# Patient Record
Sex: MALE | Race: WHITE | NOT HISPANIC OR LATINO | Employment: UNEMPLOYED | URBAN - NONMETROPOLITAN AREA
[De-identification: names, ages, dates, MRNs, and addresses within clinical notes are randomized per-mention and may not be internally consistent; named-entity substitution may affect disease eponyms.]

---

## 2021-07-02 ENCOUNTER — OFFICE VISIT (OUTPATIENT)
Dept: FAMILY MEDICINE CLINIC | Facility: CLINIC | Age: 51
End: 2021-07-02

## 2021-07-02 VITALS
DIASTOLIC BLOOD PRESSURE: 95 MMHG | BODY MASS INDEX: 40.12 KG/M2 | TEMPERATURE: 98.5 F | WEIGHT: 296.2 LBS | RESPIRATION RATE: 20 BRPM | HEIGHT: 72 IN | SYSTOLIC BLOOD PRESSURE: 160 MMHG | OXYGEN SATURATION: 98 % | HEART RATE: 89 BPM

## 2021-07-02 DIAGNOSIS — Z12.11 SCREEN FOR COLON CANCER: ICD-10-CM

## 2021-07-02 DIAGNOSIS — Z13.220 SCREENING FOR HYPERLIPIDEMIA: ICD-10-CM

## 2021-07-02 DIAGNOSIS — I10 ESSENTIAL HYPERTENSION: ICD-10-CM

## 2021-07-02 DIAGNOSIS — R20.2 PARESTHESIA: ICD-10-CM

## 2021-07-02 DIAGNOSIS — Z00.00 ROUTINE PHYSICAL EXAMINATION: ICD-10-CM

## 2021-07-02 DIAGNOSIS — M54.42 CHRONIC BILATERAL LOW BACK PAIN WITH LEFT-SIDED SCIATICA: ICD-10-CM

## 2021-07-02 DIAGNOSIS — G89.29 CHRONIC BILATERAL LOW BACK PAIN WITH LEFT-SIDED SCIATICA: ICD-10-CM

## 2021-07-02 DIAGNOSIS — F33.0 MILD EPISODE OF RECURRENT MAJOR DEPRESSIVE DISORDER (HCC): Primary | ICD-10-CM

## 2021-07-02 DIAGNOSIS — G89.29 CHRONIC PAIN OF LEFT KNEE: ICD-10-CM

## 2021-07-02 DIAGNOSIS — G47.33 OSA (OBSTRUCTIVE SLEEP APNEA): ICD-10-CM

## 2021-07-02 DIAGNOSIS — M25.562 CHRONIC PAIN OF LEFT KNEE: ICD-10-CM

## 2021-07-02 DIAGNOSIS — R20.0 BILATERAL HAND NUMBNESS: ICD-10-CM

## 2021-07-02 PROCEDURE — 99204 OFFICE O/P NEW MOD 45 MIN: CPT | Performed by: NURSE PRACTITIONER

## 2021-07-02 RX ORDER — TRAMADOL HYDROCHLORIDE 50 MG/1
TABLET ORAL
Qty: 60 TABLET | Refills: 1 | Status: SHIPPED | OUTPATIENT
Start: 2021-07-02 | End: 2021-07-30

## 2021-07-02 RX ORDER — CARVEDILOL 3.12 MG/1
3.12 TABLET ORAL 2 TIMES DAILY WITH MEALS
Qty: 60 TABLET | Refills: 1 | Status: SHIPPED | OUTPATIENT
Start: 2021-07-02 | End: 2021-07-30 | Stop reason: SDUPTHER

## 2021-07-02 RX ORDER — LISINOPRIL 40 MG/1
40 TABLET ORAL DAILY
Qty: 90 TABLET | Refills: 1 | Status: SHIPPED | OUTPATIENT
Start: 2021-07-02 | End: 2022-01-03

## 2021-07-02 RX ORDER — AMLODIPINE BESYLATE 10 MG/1
10 TABLET ORAL DAILY
Qty: 90 TABLET | Refills: 1 | Status: SHIPPED | OUTPATIENT
Start: 2021-07-02 | End: 2022-01-03

## 2021-07-02 RX ORDER — CHLORTHALIDONE 25 MG/1
TABLET ORAL
COMMUNITY
Start: 2021-06-08 | End: 2021-07-02 | Stop reason: SDUPTHER

## 2021-07-02 RX ORDER — CHLORTHALIDONE 25 MG/1
25 TABLET ORAL DAILY
Qty: 90 TABLET | Refills: 1 | Status: SHIPPED | OUTPATIENT
Start: 2021-07-02 | End: 2022-01-03

## 2021-07-02 RX ORDER — MELOXICAM 7.5 MG/1
7.5 TABLET ORAL DAILY
Qty: 30 TABLET | Refills: 2 | Status: SHIPPED | OUTPATIENT
Start: 2021-07-02 | End: 2021-08-25

## 2021-07-02 RX ORDER — AMLODIPINE BESYLATE 10 MG/1
TABLET ORAL
COMMUNITY
Start: 2021-06-08 | End: 2021-07-02 | Stop reason: SDUPTHER

## 2021-07-02 RX ORDER — LISINOPRIL 40 MG/1
TABLET ORAL
COMMUNITY
Start: 2021-06-08 | End: 2021-07-02 | Stop reason: SDUPTHER

## 2021-07-02 NOTE — PROGRESS NOTES
"       New Patient History and Physical      Referring Physician: No ref. provider found    Subjective     Chief Complaint:    Chief Complaint   Patient presents with   • Advice Only   • Back Pain   • Knee Pain   • Med Refill   • Hand Pain     numbness in both hands       History of Present Illness:   Moved here from [place].   Has hx of HTN. Takes norvasc, lisinopril, and chlorthalidone. Notes his BP is normally 160's.   He takes zoloft 50mg daily. Has hx of depression. Notes it takes the \"edge off\".   He has hx of MADELAINE but he could not tolerate CPAP. He tried for a few weeks but it made him feel like he smothered.   He has hx of chronic back pain, budging disc, DDD. He has hx of cortisone injections. Hurt so bad he passed out and then helped for about 2 days. He takes naproxen for pain. Does not help. He takes the a few times a day.   He also has chronic knee pain on the left. Happened after he twisted it working outside. Never seen ortho.   He notes numbness in his hands, used to be worse with overhead motions. Now constant, worse in right pinky and extends into his palm. He is dropping things. Numbness is constant.     Review of Systems   Gen- No fevers, chills  CV- No chest pain, palpitations  Resp- No cough, dyspnea  GI- No N/V/D, abd pain  Neuro-No dizziness, headaches    Past Medical History:   Past Medical History:   Diagnosis Date   • Bulging lumbar disc    • Hypertension    • Low back pain    • Pinched nerve    • Sleep apnea        Past Surgical History:History reviewed. No pertinent surgical history.    Family History: family history is not on file.    Social History:  reports that he has been smoking cigarettes. He has been smoking about 0.50 packs per day. He has never used smokeless tobacco. He reports current alcohol use. He reports that he does not use drugs.    Medications:    Current Outpatient Medications:   •  amLODIPine (NORVASC) 10 MG tablet, Take 1 tablet by mouth Daily., Disp: 90 tablet, Rfl: " "1  •  chlorthalidone (HYGROTON) 25 MG tablet, Take 1 tablet by mouth Daily., Disp: 90 tablet, Rfl: 1  •  lisinopril (PRINIVIL,ZESTRIL) 40 MG tablet, Take 1 tablet by mouth Daily., Disp: 90 tablet, Rfl: 1  •  carvedilol (Coreg) 3.125 MG tablet, Take 1 tablet by mouth 2 (Two) Times a Day With Meals., Disp: 60 tablet, Rfl: 1  •  meloxicam (Mobic) 7.5 MG tablet, Take 1 tablet by mouth Daily., Disp: 30 tablet, Rfl: 2  •  sertraline (Zoloft) 50 MG tablet, Take 1 tablet by mouth Daily., Disp: 90 tablet, Rfl: 1  •  traMADol (ULTRAM) 50 MG tablet, 1 po bid prn pain, Disp: 60 tablet, Rfl: 1    Allergies:  No Known Allergies    Objective     Vital Signs:   Vitals:    07/02/21 1031   BP: 160/95   Pulse: 89   Resp: 20   Temp: 98.5 °F (36.9 °C)   SpO2: 98%   Weight: 134 kg (296 lb 3.2 oz)   Height: 182.9 cm (72\")       Physical Exam:    Physical Exam  Vitals and nursing note reviewed.   Constitutional:       Appearance: He is well-developed. He is obese.   HENT:      Head: Normocephalic and atraumatic.      Right Ear: Tympanic membrane, ear canal and external ear normal.      Left Ear: Tympanic membrane, ear canal and external ear normal.      Nose: Nose normal.      Mouth/Throat:      Pharynx: Uvula midline.   Eyes:      General: Lids are normal. No scleral icterus.     Conjunctiva/sclera: Conjunctivae normal.      Pupils: Pupils are equal, round, and reactive to light.   Neck:      Thyroid: No thyromegaly.      Vascular: No carotid bruit.      Trachea: No tracheal deviation.   Cardiovascular:      Rate and Rhythm: Normal rate and regular rhythm.      Heart sounds: Normal heart sounds. No murmur heard.   No friction rub. No gallop.    Pulmonary:      Effort: Pulmonary effort is normal.      Breath sounds: Normal breath sounds.   Abdominal:      General: Bowel sounds are normal. There is no distension.      Palpations: Abdomen is soft.      Tenderness: There is no abdominal tenderness.   Musculoskeletal:      Cervical back: Neck " supple.      Thoracic back: Tenderness present.      Lumbar back: Positive right straight leg raise test.      Left knee: Crepitus present.   Lymphadenopathy:      Head:      Right side of head: No submental, submandibular, tonsillar, preauricular or posterior auricular adenopathy.      Left side of head: No submental, submandibular, tonsillar, preauricular or posterior auricular adenopathy.      Cervical: No cervical adenopathy.   Skin:     General: Skin is warm and dry.      Capillary Refill: Capillary refill takes less than 2 seconds.      Findings: No rash.   Neurological:      General: No focal deficit present.      Mental Status: He is alert and oriented to person, place, and time.      Cranial Nerves: No cranial nerve deficit.      Sensory: No sensory deficit.   Psychiatric:         Mood and Affect: Mood normal.         Behavior: Behavior normal.         Assessment / Plan     Assessment/Plan:   Problem List Items Addressed This Visit     None      Visit Diagnoses     Mild episode of recurrent major depressive disorder (CMS/HCC)    -  Primary    Relevant Medications    sertraline (Zoloft) 50 MG tablet    MADELAINE (obstructive sleep apnea)        Relevant Orders    Ambulatory Referral to Sleep Medicine    Paresthesia        Relevant Orders    Vitamin B12 (Completed)    Folate (Completed)    Chronic pain of left knee        Relevant Medications    meloxicam (Mobic) 7.5 MG tablet    Other Relevant Orders    XR Knee 3 View Left    Ambulatory Referral to Orthopedic Surgery    Essential hypertension        Relevant Medications    amLODIPine (NORVASC) 10 MG tablet    chlorthalidone (HYGROTON) 25 MG tablet    lisinopril (PRINIVIL,ZESTRIL) 40 MG tablet    carvedilol (Coreg) 3.125 MG tablet    Other Relevant Orders    Comprehensive Metabolic Panel (Completed)    CBC Auto Differential    Lipid Panel (Completed)    Chronic bilateral low back pain with left-sided sciatica        Relevant Medications    traMADol (ULTRAM) 50 MG  tablet    Bilateral hand numbness        Relevant Orders    Ambulatory Referral to Orthopedic Surgery    Screening for hyperlipidemia        Relevant Orders    Hemoglobin A1c (Completed)    Screen for colon cancer        Relevant Orders    Cologuard - Stool, Per Rectum    Routine physical examination            --Orders as above  --Add Coreg to current blood pressure med regimen.  Discussed blood pressure goal of systolic less than 140. Meds, diet, and lifestyle recommendation discussed at length. Home BP monitoring encouraged and appropriate intervals discussed.   --We will refer him to Ortho for likely carpal tunnel and left knee issues.  Did discuss bracing at night.  Start meloxicam.  Stop naproxen.  --Due to chronic pain will start low-dose tramadol.  --Continue Zoloft.  Mood well controlled.  --Referral to sleep medicine to see if they can help get him a better CPAP that he will be able to tolerate.  Did discuss untreated sleep apnea can make high blood pressure difficult to control    I have reviewed pertinent health maintenance applicable to this patient.    Follow up:  Return in about 4 weeks (around 7/30/2021).    Electronically signed by AMY Escamilla   07/02/2021 11:00 EDT      Please note that portions of this note may have been completed with a voice recognition program. Efforts were made to edit the dictations, but occasionally words are mistranscribed.

## 2021-07-03 LAB
ALBUMIN SERPL-MCNC: 4.7 G/DL (ref 3.5–5.2)
ALBUMIN/GLOB SERPL: 1.2 G/DL
ALP SERPL-CCNC: 82 U/L (ref 39–117)
ALT SERPL-CCNC: 84 U/L (ref 1–41)
AST SERPL-CCNC: 53 U/L (ref 1–40)
BASOPHILS # BLD AUTO: 0.08 10*3/MM3 (ref 0–0.2)
BASOPHILS NFR BLD AUTO: 1.3 % (ref 0–1.5)
BILIRUB SERPL-MCNC: 0.4 MG/DL (ref 0–1.2)
BUN SERPL-MCNC: 21 MG/DL (ref 6–20)
BUN/CREAT SERPL: 21 (ref 7–25)
CALCIUM SERPL-MCNC: 10.1 MG/DL (ref 8.6–10.5)
CHLORIDE SERPL-SCNC: 98 MMOL/L (ref 98–107)
CHOLEST SERPL-MCNC: 177 MG/DL (ref 0–200)
CO2 SERPL-SCNC: 26.6 MMOL/L (ref 22–29)
CREAT SERPL-MCNC: 1 MG/DL (ref 0.76–1.27)
EOSINOPHIL # BLD AUTO: 0.28 10*3/MM3 (ref 0–0.4)
EOSINOPHIL NFR BLD AUTO: 4.7 % (ref 0.3–6.2)
ERYTHROCYTE [DISTWIDTH] IN BLOOD BY AUTOMATED COUNT: 14.9 % (ref 12.3–15.4)
FOLATE SERPL-MCNC: 3.14 NG/ML (ref 4.78–24.2)
GLOBULIN SER CALC-MCNC: 3.8 GM/DL
GLUCOSE SERPL-MCNC: 102 MG/DL (ref 65–99)
HBA1C MFR BLD: 5.9 % (ref 4.8–5.6)
HCT VFR BLD AUTO: 47.6 % (ref 37.5–51)
HDLC SERPL-MCNC: 37 MG/DL (ref 40–60)
HGB BLD-MCNC: 16.5 G/DL (ref 13–17.7)
IMM GRANULOCYTES # BLD AUTO: 0.02 10*3/MM3 (ref 0–0.05)
IMM GRANULOCYTES NFR BLD AUTO: 0.3 % (ref 0–0.5)
LDLC SERPL CALC-MCNC: 113 MG/DL (ref 0–100)
LYMPHOCYTES # BLD AUTO: 1.89 10*3/MM3 (ref 0.7–3.1)
LYMPHOCYTES NFR BLD AUTO: 31.7 % (ref 19.6–45.3)
MCH RBC QN AUTO: 33.1 PG (ref 26.6–33)
MCHC RBC AUTO-ENTMCNC: 34.7 G/DL (ref 31.5–35.7)
MCV RBC AUTO: 95.6 FL (ref 79–97)
MONOCYTES # BLD AUTO: 0.6 10*3/MM3 (ref 0.1–0.9)
MONOCYTES NFR BLD AUTO: 10.1 % (ref 5–12)
NEUTROPHILS # BLD AUTO: 3.1 10*3/MM3 (ref 1.7–7)
NEUTROPHILS NFR BLD AUTO: 51.9 % (ref 42.7–76)
NRBC BLD AUTO-RTO: 0.2 /100 WBC (ref 0–0.2)
PLATELET # BLD AUTO: 304 10*3/MM3 (ref 140–450)
POTASSIUM SERPL-SCNC: 4.6 MMOL/L (ref 3.5–5.2)
PROT SERPL-MCNC: 8.5 G/DL (ref 6–8.5)
RBC # BLD AUTO: 4.98 10*6/MM3 (ref 4.14–5.8)
SODIUM SERPL-SCNC: 137 MMOL/L (ref 136–145)
TRIGL SERPL-MCNC: 149 MG/DL (ref 0–150)
VIT B12 SERPL-MCNC: 415 PG/ML (ref 211–946)
VLDLC SERPL CALC-MCNC: 27 MG/DL (ref 5–40)
WBC # BLD AUTO: 5.97 10*3/MM3 (ref 3.4–10.8)

## 2021-07-06 NOTE — PROGRESS NOTES
Labs show mild elevation in liver enzymes.  Likely fatty liver.  Has he ever had a liver ultrasound or been told this before?  Hemoglobin A1c is 5.9.  This is consistent with prediabetes.  Work on decreasing sugary and starchy foods.  His folic acid was low.  Likely due to alcohol intake.  Can take folic acid supplement over-the-counter.

## 2021-07-12 DIAGNOSIS — R79.89 ELEVATED LFTS: Primary | ICD-10-CM

## 2021-07-26 ENCOUNTER — APPOINTMENT (OUTPATIENT)
Dept: ULTRASOUND IMAGING | Facility: HOSPITAL | Age: 51
End: 2021-07-26

## 2021-07-30 ENCOUNTER — OFFICE VISIT (OUTPATIENT)
Dept: FAMILY MEDICINE CLINIC | Facility: CLINIC | Age: 51
End: 2021-07-30

## 2021-07-30 VITALS
RESPIRATION RATE: 20 BRPM | TEMPERATURE: 98.7 F | HEART RATE: 85 BPM | HEIGHT: 72 IN | DIASTOLIC BLOOD PRESSURE: 84 MMHG | BODY MASS INDEX: 39.66 KG/M2 | SYSTOLIC BLOOD PRESSURE: 158 MMHG | WEIGHT: 292.8 LBS | OXYGEN SATURATION: 98 %

## 2021-07-30 DIAGNOSIS — M25.562 CHRONIC PAIN OF LEFT KNEE: Primary | ICD-10-CM

## 2021-07-30 DIAGNOSIS — G89.29 CHRONIC BILATERAL LOW BACK PAIN WITH LEFT-SIDED SCIATICA: ICD-10-CM

## 2021-07-30 DIAGNOSIS — I10 ESSENTIAL HYPERTENSION: ICD-10-CM

## 2021-07-30 DIAGNOSIS — M54.42 CHRONIC BILATERAL LOW BACK PAIN WITH LEFT-SIDED SCIATICA: ICD-10-CM

## 2021-07-30 DIAGNOSIS — G89.29 CHRONIC PAIN OF LEFT KNEE: Primary | ICD-10-CM

## 2021-07-30 PROCEDURE — 99213 OFFICE O/P EST LOW 20 MIN: CPT | Performed by: NURSE PRACTITIONER

## 2021-07-30 RX ORDER — CARVEDILOL 6.25 MG/1
6.25 TABLET ORAL 2 TIMES DAILY WITH MEALS
Qty: 60 TABLET | Refills: 2 | Status: SHIPPED | OUTPATIENT
Start: 2021-07-30 | End: 2021-10-19

## 2021-07-30 RX ORDER — GABAPENTIN 300 MG/1
CAPSULE ORAL
Qty: 60 CAPSULE | Refills: 1 | Status: SHIPPED | OUTPATIENT
Start: 2021-07-30 | End: 2021-08-25

## 2021-07-30 NOTE — PROGRESS NOTES
"      Subjective     Chief Complaint:    Chief Complaint   Patient presents with   • Follow-up     bp       History of Present Illness:   Was started on coreg last visit. Tolerating without side effects. Does not check BP at home.   Was also started on mobic and tramadol for pain. Neither has helped. He cannot even tell he takes it. He continues to have chronic back pain, chronic left knee pain, bilateral hand numbness. Does not have ortho appt yet. Has not had appt yet either.     Review of Systems  Gen- No fevers, chills  CV- No chest pain, palpitations  Resp- No cough, dyspnea  GI- No N/V/D, abd pain  Neuro-No dizziness, headaches      I have reviewed and/or updated the patient's past medical, surgical, family, social history and problem list as appropriate.     Medications:    Current Outpatient Medications:   •  amLODIPine (NORVASC) 10 MG tablet, Take 1 tablet by mouth Daily., Disp: 90 tablet, Rfl: 1  •  carvedilol (Coreg) 6.25 MG tablet, Take 1 tablet by mouth 2 (Two) Times a Day With Meals., Disp: 60 tablet, Rfl: 2  •  chlorthalidone (HYGROTON) 25 MG tablet, Take 1 tablet by mouth Daily., Disp: 90 tablet, Rfl: 1  •  lisinopril (PRINIVIL,ZESTRIL) 40 MG tablet, Take 1 tablet by mouth Daily., Disp: 90 tablet, Rfl: 1  •  meloxicam (Mobic) 7.5 MG tablet, Take 1 tablet by mouth Daily., Disp: 30 tablet, Rfl: 2  •  sertraline (Zoloft) 50 MG tablet, Take 1 tablet by mouth Daily., Disp: 90 tablet, Rfl: 1  •  gabapentin (NEURONTIN) 300 MG capsule, Take 1 capsule daily x 3 days, may increase 1 capsule every 3 days up to 3 times daily, Disp: 60 capsule, Rfl: 1    Allergies:  No Known Allergies    Objective     Vital Signs:   Vitals:    07/30/21 1306   BP: 158/84   Pulse: 85   Resp: 20   Temp: 98.7 °F (37.1 °C)   SpO2: 98%   Weight: 133 kg (292 lb 12.8 oz)   Height: 182.9 cm (72\")       Physical Exam:    Physical Exam  Vitals and nursing note reviewed.   Constitutional:       Appearance: He is well-developed.   HENT:      " Head: Normocephalic and atraumatic.   Eyes:      Pupils: Pupils are equal, round, and reactive to light.   Cardiovascular:      Rate and Rhythm: Normal rate and regular rhythm.      Heart sounds: Normal heart sounds.   Pulmonary:      Effort: Pulmonary effort is normal.      Breath sounds: Normal breath sounds.   Abdominal:      General: Bowel sounds are normal. There is no distension.      Palpations: Abdomen is soft.      Tenderness: There is no abdominal tenderness.   Musculoskeletal:      Cervical back: Neck supple.   Skin:     General: Skin is warm and dry.   Neurological:      Mental Status: He is alert and oriented to person, place, and time.   Psychiatric:         Behavior: Behavior normal.         Body mass index is 39.71 kg/m².    Assessment / Plan     Assessment/Plan:   Problem List Items Addressed This Visit        Cardiac and Vasculature    Essential hypertension    Relevant Medications    amLODIPine (NORVASC) 10 MG tablet    chlorthalidone (HYGROTON) 25 MG tablet    lisinopril (PRINIVIL,ZESTRIL) 40 MG tablet    carvedilol (Coreg) 6.25 MG tablet       Musculoskeletal and Injuries    Chronic pain of left knee - Primary    Relevant Medications    meloxicam (Mobic) 7.5 MG tablet    Chronic bilateral low back pain with left-sided sciatica    Relevant Medications    gabapentin (NEURONTIN) 300 MG capsule        -- increase coreg to 6.25mg.   -- stop tramadol. Trial gabapentin for pain. Cautioned against using with alcohol  -- I gave him the phone number to ortho for him to call and schedule appt. Needs to get knee xray    Follow up:  Return in about 4 weeks (around 8/27/2021).    Electronically signed by AMY Escamilla   07/30/2021 13:48 EDT      Please note that portions of this note may have been completed with a voice recognition program. Efforts were made to edit the dictations, but occasionally words are mistranscribed.

## 2021-08-20 ENCOUNTER — HOSPITAL ENCOUNTER (OUTPATIENT)
Dept: GENERAL RADIOLOGY | Facility: HOSPITAL | Age: 51
Discharge: HOME | End: 2021-08-20
Admitting: NURSE PRACTITIONER

## 2021-08-20 DIAGNOSIS — M25.562 CHRONIC PAIN OF LEFT KNEE: ICD-10-CM

## 2021-08-20 DIAGNOSIS — G89.29 CHRONIC PAIN OF LEFT KNEE: ICD-10-CM

## 2021-08-20 PROCEDURE — 73562 X-RAY EXAM OF KNEE 3: CPT

## 2021-08-25 ENCOUNTER — OFFICE VISIT (OUTPATIENT)
Dept: FAMILY MEDICINE CLINIC | Facility: CLINIC | Age: 51
End: 2021-08-25

## 2021-08-25 VITALS
DIASTOLIC BLOOD PRESSURE: 92 MMHG | SYSTOLIC BLOOD PRESSURE: 138 MMHG | BODY MASS INDEX: 40.44 KG/M2 | HEART RATE: 78 BPM | HEIGHT: 72 IN | OXYGEN SATURATION: 97 % | WEIGHT: 298.6 LBS

## 2021-08-25 DIAGNOSIS — G89.29 CHRONIC BILATERAL LOW BACK PAIN WITH LEFT-SIDED SCIATICA: ICD-10-CM

## 2021-08-25 DIAGNOSIS — I10 ESSENTIAL HYPERTENSION: Primary | ICD-10-CM

## 2021-08-25 DIAGNOSIS — G89.29 CHRONIC PAIN OF LEFT KNEE: ICD-10-CM

## 2021-08-25 DIAGNOSIS — M25.562 CHRONIC PAIN OF LEFT KNEE: ICD-10-CM

## 2021-08-25 DIAGNOSIS — M54.42 CHRONIC BILATERAL LOW BACK PAIN WITH LEFT-SIDED SCIATICA: ICD-10-CM

## 2021-08-25 PROCEDURE — 99214 OFFICE O/P EST MOD 30 MIN: CPT | Performed by: NURSE PRACTITIONER

## 2021-08-25 RX ORDER — MELOXICAM 15 MG/1
15 TABLET ORAL DAILY
Qty: 30 TABLET | Refills: 3 | Status: SHIPPED | OUTPATIENT
Start: 2021-08-25 | End: 2022-02-01

## 2021-08-25 NOTE — PROGRESS NOTES
"      Subjective     Chief Complaint:    Chief Complaint   Patient presents with   • Follow-up     Patient complains of left knee pain and numbness in both hands.       History of Present Illness:   Here to f/u on pain and BP.   Tolerating increased dose of coreg  He has failed injections in his back  Gabapentin did not help his pain.   Has not made ortho appt yet  He has continued left knee pain.     Review of Systems  Gen- No fevers, chills  CV- No chest pain, palpitations  Resp- No cough, dyspnea  GI- No N/V/D, abd pain  Neuro-No dizziness, headaches      I have reviewed and/or updated the patient's past medical, surgical, family, social history and problem list as appropriate.     Medications:    Current Outpatient Medications:   •  amLODIPine (NORVASC) 10 MG tablet, Take 1 tablet by mouth Daily., Disp: 90 tablet, Rfl: 1  •  carvedilol (Coreg) 6.25 MG tablet, Take 1 tablet by mouth 2 (Two) Times a Day With Meals., Disp: 60 tablet, Rfl: 2  •  chlorthalidone (HYGROTON) 25 MG tablet, Take 1 tablet by mouth Daily., Disp: 90 tablet, Rfl: 1  •  lisinopril (PRINIVIL,ZESTRIL) 40 MG tablet, Take 1 tablet by mouth Daily., Disp: 90 tablet, Rfl: 1  •  meloxicam (Mobic) 15 MG tablet, Take 1 tablet by mouth Daily., Disp: 30 tablet, Rfl: 3  •  sertraline (Zoloft) 50 MG tablet, Take 1 tablet by mouth Daily., Disp: 90 tablet, Rfl: 1    Allergies:  No Known Allergies    Objective     Vital Signs:   Vitals:    08/25/21 1340   BP: 138/92   Pulse: 78   SpO2: 97%   Weight: 135 kg (298 lb 9.6 oz)   Height: 182.9 cm (72\")       Physical Exam:    Physical Exam  Vitals and nursing note reviewed.   Constitutional:       Appearance: He is well-developed.   HENT:      Head: Normocephalic and atraumatic.   Eyes:      Pupils: Pupils are equal, round, and reactive to light.   Cardiovascular:      Rate and Rhythm: Normal rate and regular rhythm.      Heart sounds: Normal heart sounds.   Pulmonary:      Effort: Pulmonary effort is normal.      " Breath sounds: Normal breath sounds.   Abdominal:      General: Bowel sounds are normal. There is no distension.      Palpations: Abdomen is soft.      Tenderness: There is no abdominal tenderness.   Musculoskeletal:      Cervical back: Neck supple.   Skin:     General: Skin is warm and dry.   Neurological:      Mental Status: He is alert and oriented to person, place, and time.   Psychiatric:         Behavior: Behavior normal.         Body mass index is 40.5 kg/m².    Assessment / Plan     Assessment/Plan:   Problem List Items Addressed This Visit        Cardiac and Vasculature    Essential hypertension - Primary    Relevant Medications    amLODIPine (NORVASC) 10 MG tablet    chlorthalidone (HYGROTON) 25 MG tablet    lisinopril (PRINIVIL,ZESTRIL) 40 MG tablet    carvedilol (Coreg) 6.25 MG tablet       Musculoskeletal and Injuries    Chronic pain of left knee    Relevant Medications    meloxicam (Mobic) 15 MG tablet    Chronic bilateral low back pain with left-sided sciatica        -- BP much better controlled on current regimen  -- will reach out to referral team for ortho referral, increase mobic to 15mg, stop osmany as it is not helping. We discussed pain management of which he wishes to hold off on at this time    Follow up:  Return in about 3 months (around 11/25/2021).    Electronically signed by AMY Escamilla   08/25/2021 14:05 EDT      Please note that portions of this note may have been completed with a voice recognition program. Efforts were made to edit the dictations, but occasionally words are mistranscribed.

## 2021-08-31 ENCOUNTER — OFFICE VISIT (OUTPATIENT)
Dept: NEUROLOGY | Facility: CLINIC | Age: 51
End: 2021-08-31

## 2021-08-31 ENCOUNTER — TELEPHONE (OUTPATIENT)
Dept: FAMILY MEDICINE CLINIC | Facility: CLINIC | Age: 51
End: 2021-08-31

## 2021-08-31 VITALS
DIASTOLIC BLOOD PRESSURE: 100 MMHG | HEIGHT: 72 IN | HEART RATE: 73 BPM | OXYGEN SATURATION: 99 % | SYSTOLIC BLOOD PRESSURE: 140 MMHG | BODY MASS INDEX: 41.42 KG/M2 | TEMPERATURE: 97.7 F | WEIGHT: 305.8 LBS

## 2021-08-31 DIAGNOSIS — G47.33 OBSTRUCTIVE SLEEP APNEA: Primary | ICD-10-CM

## 2021-08-31 DIAGNOSIS — G47.52 REM BEHAVIORAL DISORDER: ICD-10-CM

## 2021-08-31 DIAGNOSIS — F39 MOOD DISORDER (HCC): ICD-10-CM

## 2021-08-31 DIAGNOSIS — I10 HYPERTENSION, UNSPECIFIED TYPE: ICD-10-CM

## 2021-08-31 DIAGNOSIS — F10.90 CHRONIC ALCOHOL USE: ICD-10-CM

## 2021-08-31 DIAGNOSIS — G47.19 EXCESSIVE DAYTIME SLEEPINESS: ICD-10-CM

## 2021-08-31 PROCEDURE — 99213 OFFICE O/P EST LOW 20 MIN: CPT | Performed by: NURSE PRACTITIONER

## 2021-08-31 NOTE — PROGRESS NOTES
"     New Sleep Patient Office Visit      Patient Name: Dharmesh Samuel  : 1970   MRN: [id number]     Referring Physician: Michelle Sky APRN    Chief Complaint:    Chief Complaint   Patient presents with   • Consult     NP, in office to establish care for madelaine.        History of Present Illness: Dharmesh Samuel is a 50 y.o. male who is here today to establish care with Sleep Medicine.  He was diagnosed with severe MADELAINE, in [place], in 2019.  He was on Bipap therapy for a while but says he had to turn his machine back in because of compliance issues.  He is wanting to get back on PAP therapy.  He is having to sleep in a recliner because he can't lay down and sleep.  Sleep questionnaire reviewed.  He is able to fall asleep quickly, he wakes up 5-6 times during sleep and has no difficulty falling to sleep, he has pain that interferes with sleep, he has \"really stupid dreams\", he sleeps 3-4 hours per night, he has leg kicking/leg movements during sleep, he experiences restless or disturbed sleep, he takes naps on some days, he denies any excessive sleepiness when driving, he has been told he stops breathing during sleep.  He was previously diagnosed with REM behavior disorder.  He drinks alcohol each night before bed timea nd states \"without it, I wouldn't sleep at all\".  He asks if he can be prescribed a medication for weight loss and states \"I have tried everything and I can't lose weight\".  Additional risk factors- BMI 41, HTN, mood disorder, chronic alcohol use, chronic back pain, REM behavior disorder.   *Previous records from [facility], reviewed at the time of his visit.     Ronco Score: 21    Subjective      Review of Systems:   Review of Systems   Constitutional: Positive for fatigue. Negative for fever, unexpected weight gain and unexpected weight loss.   HENT: Negative for hearing loss, sore throat, swollen glands, tinnitus and trouble swallowing.    Eyes: Negative for blurred vision, double " vision, photophobia and visual disturbance.   Respiratory: Negative for cough, chest tightness and shortness of breath.    Cardiovascular: Negative for chest pain, palpitations and leg swelling.   Gastrointestinal: Negative for constipation, diarrhea and nausea.   Endocrine: Negative for cold intolerance and heat intolerance.   Musculoskeletal: Positive for back pain. Negative for gait problem, neck pain and neck stiffness.   Skin: Negative for color change and rash.   Allergic/Immunologic: Negative for environmental allergies and food allergies.   Neurological: Negative for dizziness, syncope, facial asymmetry, speech difficulty, weakness, headache, memory problem and confusion.   Psychiatric/Behavioral: Positive for sleep disturbance. Negative for agitation, behavioral problems and depressed mood. The patient is not nervous/anxious.        Past Medical History:   Past Medical History:   Diagnosis Date   • Arthritis    • Bulging lumbar disc    • Hypertension    • Low back pain    • Pinched nerve    • Sleep apnea        Past Surgical History: History reviewed. No pertinent surgical history.    Family History:   Family History   Problem Relation Age of Onset   • Sleep apnea Paternal Uncle        Social History:   Social History     Socioeconomic History   • Marital status:      Spouse name: Not on file   • Number of children: Not on file   • Years of education: Not on file   • Highest education level: Not on file   Tobacco Use   • Smoking status: Current Every Day Smoker     Packs/day: 0.50     Types: Cigarettes   • Smokeless tobacco: Current User   Substance and Sexual Activity   • Alcohol use: Yes     Comment: 4 drinks a day   • Drug use: Never   • Sexual activity: Defer       Medications:     Current Outpatient Medications:   •  amLODIPine (NORVASC) 10 MG tablet, Take 1 tablet by mouth Daily., Disp: 90 tablet, Rfl: 1  •  carvedilol (Coreg) 6.25 MG tablet, Take 1 tablet by mouth 2 (Two) Times a Day With  "Meals., Disp: 60 tablet, Rfl: 2  •  chlorthalidone (HYGROTON) 25 MG tablet, Take 1 tablet by mouth Daily., Disp: 90 tablet, Rfl: 1  •  lisinopril (PRINIVIL,ZESTRIL) 40 MG tablet, Take 1 tablet by mouth Daily., Disp: 90 tablet, Rfl: 1  •  meloxicam (Mobic) 15 MG tablet, Take 1 tablet by mouth Daily., Disp: 30 tablet, Rfl: 3  •  sertraline (Zoloft) 50 MG tablet, Take 1 tablet by mouth Daily., Disp: 90 tablet, Rfl: 1    Allergies:   No Known Allergies    Objective     Physical Exam:  Vital Signs:   Vitals:    08/31/21 1037   BP: 140/100   BP Location: Left arm   Patient Position: Sitting   Cuff Size: Adult   Pulse: 73   Temp: 97.7 °F (36.5 °C)   SpO2: 99%   Weight: (!) 139 kg (305 lb 12.8 oz)   Height: 182.9 cm (72\")   PainSc:   8   PainLoc: Back  Comment: knee     BMI: Body mass index is 41.47 kg/m².  Neck Circumference: 20 INCHES     Physical Exam  Vitals and nursing note reviewed.   Constitutional:       General: He is not in acute distress.     Appearance: He is well-developed. He is obese. He is not diaphoretic.   HENT:      Head: Normocephalic and atraumatic.      Comments: Mallampati 4  Eyes:      Conjunctiva/sclera: Conjunctivae normal.      Pupils: Pupils are equal, round, and reactive to light.   Neck:      Trachea: Trachea normal.   Cardiovascular:      Rate and Rhythm: Normal rate and regular rhythm.      Heart sounds: Normal heart sounds. No murmur heard.   No friction rub. No gallop.    Pulmonary:      Effort: Pulmonary effort is normal. No respiratory distress.      Breath sounds: Normal breath sounds. No wheezing or rales.   Musculoskeletal:         General: Normal range of motion.   Skin:     General: Skin is warm and dry.      Findings: No rash.   Neurological:      Mental Status: He is alert and oriented to person, place, and time.   Psychiatric:         Mood and Affect: Mood normal.         Behavior: Behavior normal.         Thought Content: Thought content normal.         Judgment: Judgment normal. "         Assessment / Plan      Assessment/Plan:   Diagnoses and all orders for this visit:    1. Obstructive sleep apnea (Primary)    2. Hypertension, unspecified type    3. Mood disorder (CMS/[facility])    4. Excessive daytime sleepiness    5. Chronic alcohol use    6. REM behavioral disorder    7. BMI 40.0-44.9, adult (CMS/HCC)    *Order for Bipap 20/16cm sent to [facility]. Advised patient insurance will require him to wear his Bipap for at least 5 hours 70% of the time.   *Advised patient to avoid driving if drowsy.   *Printed patient education on MADELAINE and REM behavior disorder provided today. Safety precautions regarding REM behavior disorder discussed with patient.   *Encouraged weight loss with a BMI goal of 24.    *Advised patient to discuss medications, that may help with weight loss, with his PCP.     Follow Up:   Return in about 3 months (around 11/30/2021) for F/U Obstructive Sleep Apnea.    I have advised the patient the need to continue the use of CPAP.  Gold standard for treatment of sleep apnea includes weight loss, use of cpap and avoidance of alcohol.  Untreated MADELAINE may increase the risk for development of hypertension, stroke, myocardial infarction, diabetes, cardiovascular disease, work-related issues and driving accidents. I have counseled and advised the patient to avoid driving or operating heavy/dangerous equipment if feeling drowsy.     AMY Goel, FNP-C  [facility] Neurology and Sleep Medicine       Please note that portions of this note may have been completed with a voice recognition program. Efforts were made to edit the dictations, but occasionally words are mistranscribed.

## 2021-08-31 NOTE — PATIENT INSTRUCTIONS
Sleep Apnea  Sleep apnea affects breathing during sleep. It causes breathing to stop for a short time or to become shallow. It can also increase the risk of:  · Heart attack.  · Stroke.  · Being very overweight (obese).  · Diabetes.  · Heart failure.  · Irregular heartbeat.  The goal of treatment is to help you breathe normally again.  What are the causes?  There are three kinds of sleep apnea:  · Obstructive sleep apnea. This is caused by a blocked or collapsed airway.  · Central sleep apnea. This happens when the brain does not send the right signals to the muscles that control breathing.  · Mixed sleep apnea. This is a combination of obstructive and central sleep apnea.  The most common cause of this condition is a collapsed or blocked airway. This can happen if:  · Your throat muscles are too relaxed.  · Your tongue and tonsils are too large.  · You are overweight.  · Your airway is too small.  What increases the risk?  · Being overweight.  · Smoking.  · Having a small airway.  · Being older.  · Being male.  · Drinking alcohol.  · Taking medicines to calm yourself (sedatives or tranquilizers).  · Having family members with the condition.  What are the signs or symptoms?  · Trouble staying asleep.  · Being sleepy or tired during the day.  · Getting angry a lot.  · Loud snoring.  · Headaches in the morning.  · Not being able to focus your mind (concentrate).  · Forgetting things.  · Less interest in sex.  · Mood swings.  · Personality changes.  · Feelings of sadness (depression).  · Waking up a lot during the night to pee (urinate).  · Dry mouth.  · Sore throat.  How is this diagnosed?  · Your medical history.  · A physical exam.  · A test that is done when you are sleeping (sleep study). The test is most often done in a sleep lab but may also be done at home.  How is this treated?    · Sleeping on your side.  · Using a medicine to get rid of mucus in your nose (decongestant).  · Avoiding the use of alcohol,  medicines to help you relax, or certain pain medicines (narcotics).  · Losing weight, if needed.  · Changing your diet.  · Not smoking.  · Using a machine to open your airway while you sleep, such as:  ? An oral appliance. This is a mouthpiece that shifts your lower jaw forward.  ? A CPAP device. This device blows air through a mask when you breathe out (exhale).  ? An EPAP device. This has valves that you put in each nostril.  ? A BPAP device. This device blows air through a mask when you breathe in (inhale) and breathe out.  · Having surgery if other treatments do not work.  It is important to get treatment for sleep apnea. Without treatment, it can lead to:  · High blood pressure.  · Coronary artery disease.  · In men, not being able to have an erection (impotence).  · Reduced thinking ability.  Follow these instructions at home:  Lifestyle  · Make changes that your doctor recommends.  · Eat a healthy diet.  · Lose weight if needed.  · Avoid alcohol, medicines to help you relax, and some pain medicines.  · Do not use any products that contain nicotine or tobacco, such as cigarettes, e-cigarettes, and chewing tobacco. If you need help quitting, ask your doctor.  General instructions  · Take over-the-counter and prescription medicines only as told by your doctor.  · If you were given a machine to use while you sleep, use it only as told by your doctor.  · If you are having surgery, make sure to tell your doctor you have sleep apnea. You may need to bring your device with you.  · Keep all follow-up visits as told by your doctor. This is important.  Contact a doctor if:  · The machine that you were given to use during sleep bothers you or does not seem to be working.  · You do not get better.  · You get worse.  Get help right away if:  · Your chest hurts.  · You have trouble breathing in enough air.  · You have an uncomfortable feeling in your back, arms, or stomach.  · You have trouble talking.  · One side of your  body feels weak.  · A part of your face is hanging down.  These symptoms may be an emergency. Do not wait to see if the symptoms will go away. Get medical help right away. Call your local emergency services (911 in the [place] Do not drive yourself to the hospital.  Summary  · This condition affects breathing during sleep.  · The most common cause is a collapsed or blocked airway.  · The goal of treatment is to help you breathe normally while you sleep.  This information is not intended to replace advice given to you by your health care provider. Make sure you discuss any questions you have with your health care provider.  Document Revised: 10/04/2019 Document Reviewed: 08/13/2019  [employer] Patient Education © 2021 [employer]

## 2021-09-03 ENCOUNTER — OFFICE VISIT (OUTPATIENT)
Dept: FAMILY MEDICINE CLINIC | Facility: CLINIC | Age: 51
End: 2021-09-03

## 2021-09-03 VITALS
BODY MASS INDEX: 41.47 KG/M2 | OXYGEN SATURATION: 97 % | SYSTOLIC BLOOD PRESSURE: 152 MMHG | DIASTOLIC BLOOD PRESSURE: 94 MMHG | TEMPERATURE: 97.5 F | WEIGHT: 306.2 LBS | RESPIRATION RATE: 18 BRPM | HEIGHT: 72 IN | HEART RATE: 88 BPM

## 2021-09-03 DIAGNOSIS — E66.01 CLASS 3 SEVERE OBESITY DUE TO EXCESS CALORIES WITH SERIOUS COMORBIDITY AND BODY MASS INDEX (BMI) OF 40.0 TO 44.9 IN ADULT (HCC): Primary | ICD-10-CM

## 2021-09-03 PROBLEM — E66.813 CLASS 3 SEVERE OBESITY DUE TO EXCESS CALORIES WITH SERIOUS COMORBIDITY AND BODY MASS INDEX (BMI) OF 40.0 TO 44.9 IN ADULT: Status: ACTIVE | Noted: 2021-09-03

## 2021-09-03 PROCEDURE — 99213 OFFICE O/P EST LOW 20 MIN: CPT | Performed by: NURSE PRACTITIONER

## 2021-09-03 RX ORDER — BUPROPION HYDROCHLORIDE 100 MG/1
100 TABLET, EXTENDED RELEASE ORAL
Qty: 30 TABLET | Refills: 1 | Status: SHIPPED | OUTPATIENT
Start: 2021-09-03 | End: 2022-02-16

## 2021-09-03 NOTE — PROGRESS NOTES
"      Subjective     Chief Complaint:    Chief Complaint   Patient presents with   • Weight Loss       History of Present Illness:   Has been having trouble loosing weight.   He has taking adipex in the past and it worked well. He does have HTN that is sub optimal controlled.   He is not interested in injectable medicine  He does drink 3-4 vodka drinks in the evening with almost over half a gallon of orange juice  He reports that he has tried several diets in the past including but not limited to low carb, low calorie, eating only vegetables.      Review of Systems  Gen- No fevers, chills  CV- No chest pain, palpitations  Resp- No cough, dyspnea  GI- No N/V/D, abd pain  Neuro-No dizziness, headaches      I have reviewed and/or updated the patient's past medical, surgical, family, social history and problem list as appropriate.     Medications:    Current Outpatient Medications:   •  amLODIPine (NORVASC) 10 MG tablet, Take 1 tablet by mouth Daily., Disp: 90 tablet, Rfl: 1  •  buPROPion SR (Wellbutrin SR) 100 MG 12 hr tablet, Take 1 tablet by mouth Every Morning., Disp: 30 tablet, Rfl: 1  •  carvedilol (Coreg) 6.25 MG tablet, Take 1 tablet by mouth 2 (Two) Times a Day With Meals., Disp: 60 tablet, Rfl: 2  •  chlorthalidone (HYGROTON) 25 MG tablet, Take 1 tablet by mouth Daily., Disp: 90 tablet, Rfl: 1  •  lisinopril (PRINIVIL,ZESTRIL) 40 MG tablet, Take 1 tablet by mouth Daily., Disp: 90 tablet, Rfl: 1  •  meloxicam (Mobic) 15 MG tablet, Take 1 tablet by mouth Daily., Disp: 30 tablet, Rfl: 3  •  sertraline (Zoloft) 50 MG tablet, Take 1 tablet by mouth Daily., Disp: 90 tablet, Rfl: 1    Allergies:  No Known Allergies    Objective     Vital Signs:   Vitals:    09/03/21 0854 09/03/21 0934   BP: (!) 160/104 152/94   Pulse: 88    Resp: 18    Temp: 97.5 °F (36.4 °C)    SpO2: 97%    Weight: (!) 139 kg (306 lb 3.2 oz)    Height: 182.9 cm (72\")        Physical Exam:    Physical Exam  Vitals and nursing note reviewed. "   Constitutional:       Appearance: He is well-developed. He is obese.   HENT:      Head: Normocephalic and atraumatic.   Eyes:      Pupils: Pupils are equal, round, and reactive to light.   Cardiovascular:      Rate and Rhythm: Normal rate and regular rhythm.      Heart sounds: Normal heart sounds.   Pulmonary:      Effort: Pulmonary effort is normal.      Breath sounds: Normal breath sounds.   Abdominal:      General: Bowel sounds are normal. There is no distension.      Palpations: Abdomen is soft.      Tenderness: There is no abdominal tenderness.   Musculoskeletal:      Cervical back: Neck supple.   Skin:     General: Skin is warm and dry.      Capillary Refill: Capillary refill takes less than 2 seconds.   Neurological:      General: No focal deficit present.      Mental Status: He is alert and oriented to person, place, and time.   Psychiatric:         Mood and Affect: Mood normal.         Behavior: Behavior normal.         Body mass index is 41.53 kg/m².    Assessment / Plan     Assessment/Plan:   Problem List Items Addressed This Visit        Endocrine and Metabolic    Class 3 severe obesity due to excess calories with serious comorbidity and body mass index (BMI) of 40.0 to 44.9 in adult (CMS/[facility]) - Primary    Relevant Medications    buPROPion SR (Wellbutrin SR) 100 MG 12 hr tablet        --Have encouraged patient to stop drinking as much orange juice.  Went over dietary recommendations of clean eating, eating fruits, veggies.  Avoiding sugar and starch.  Increase physical activity as tolerated however this is limited due to his knee pain.  Have collectively decided to do low-dose Wellbutrin to help with overeating aspect.  Did discuss Wellbutrin and alcohol can lower seizure threshold.  We will do sustained-release in the morning.    Follow up:  Return in about 6 weeks (around 10/15/2021).  Follow-up weight    Electronically signed by AMY Escamilla   09/03/2021 09:16 EDT      Please note that  portions of this note may have been completed with a voice recognition program. Efforts were made to edit the dictations, but occasionally words are mistranscribed.

## 2021-09-16 ENCOUNTER — OFFICE VISIT (OUTPATIENT)
Dept: ORTHOPEDIC SURGERY | Facility: CLINIC | Age: 51
End: 2021-09-16

## 2021-09-16 VITALS — HEIGHT: 72 IN | TEMPERATURE: 98.6 F | BODY MASS INDEX: 41.61 KG/M2 | WEIGHT: 307.2 LBS

## 2021-09-16 DIAGNOSIS — G89.29 CHRONIC PAIN OF LEFT KNEE: Primary | ICD-10-CM

## 2021-09-16 DIAGNOSIS — M25.562 CHRONIC PAIN OF LEFT KNEE: Primary | ICD-10-CM

## 2021-09-16 DIAGNOSIS — M17.10 ARTHRITIS OF KNEE: ICD-10-CM

## 2021-09-16 PROCEDURE — 99204 OFFICE O/P NEW MOD 45 MIN: CPT | Performed by: ORTHOPAEDIC SURGERY

## 2021-09-16 NOTE — PROGRESS NOTES
Subjective   Patient ID: Dharmesh Samuel is a 50 y.o. male  Pain of the Left Knee (Referred by AMY Escamilla for left knee pain. He states he has had pain for years, getting worse. He says knee feels unstable, has given out. Knee pops, difficulty bending due to inflamed feeling. No surgery, no injections. )             History of Present Illness  50-year-old with chronic left knee pain worsened over the last several years he attributes to a lot of sport activities including kickboxing he did for many years.  He has popping grinding inflamed full tight swollen feeling at times no recent injections no surgery.  Has tried medications with no improvement pain is mostly medial burning hurts with squatting activities.  No paresthesias swelling in the ankle or neurovascular complaints.  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      Review of Systems   Constitutional: Negative for fever.   HENT: Negative for dental problem and voice change.    Eyes: Negative for visual disturbance.   Respiratory: Negative for shortness of breath.    Cardiovascular: Negative for chest pain.   Gastrointestinal: Negative for abdominal pain.   Genitourinary: Negative for dysuria.   Musculoskeletal: Positive for arthralgias and joint swelling. Negative for gait problem.   Skin: Negative for rash.   Neurological: Negative for speech difficulty.   Hematological: Does not bruise/bleed easily.   Psychiatric/Behavioral: Negative for confusion.       Past Medical History:   Diagnosis Date   • Arthritis    • Bulging lumbar disc    • Hypertension    • Low back pain    • Pinched nerve    • Sleep apnea         No past surgical history on file.    Family History   Problem Relation Age of Onset   • Sleep apnea Paternal Uncle        Social History     Socioeconomic History   • Marital status:      Spouse name: Not on file   • Number of children: Not on file   • Years of education: Not on file   • Highest education level: Not on file  "  Tobacco Use   • Smoking status: Current Every Day Smoker     Packs/day: 0.50     Types: Cigarettes   • Smokeless tobacco: Current User   Substance and Sexual Activity   • Alcohol use: Yes     Comment: 4 drinks a day   • Drug use: Never   • Sexual activity: Defer       I have reviewed the medical and surgical history, family history, social history, medications, and/or allergies, and the review of systems of this report.    No Known Allergies      Current Outpatient Medications:   •  amLODIPine (NORVASC) 10 MG tablet, Take 1 tablet by mouth Daily., Disp: 90 tablet, Rfl: 1  •  buPROPion SR (Wellbutrin SR) 100 MG 12 hr tablet, Take 1 tablet by mouth Every Morning., Disp: 30 tablet, Rfl: 1  •  carvedilol (Coreg) 6.25 MG tablet, Take 1 tablet by mouth 2 (Two) Times a Day With Meals., Disp: 60 tablet, Rfl: 2  •  chlorthalidone (HYGROTON) 25 MG tablet, Take 1 tablet by mouth Daily., Disp: 90 tablet, Rfl: 1  •  lisinopril (PRINIVIL,ZESTRIL) 40 MG tablet, Take 1 tablet by mouth Daily., Disp: 90 tablet, Rfl: 1  •  meloxicam (Mobic) 15 MG tablet, Take 1 tablet by mouth Daily., Disp: 30 tablet, Rfl: 3  •  sertraline (Zoloft) 50 MG tablet, Take 1 tablet by mouth Daily., Disp: 90 tablet, Rfl: 1    Objective   Temp 98.6 °F (37 °C)   Ht 182.9 cm (72\")   Wt (!) 139 kg (307 lb 3.2 oz)   BMI 41.66 kg/m²    Physical Exam  Constitutional: Patient is oriented to person, place, and time. Patient appears well-developed and well-nourished.   HENT:Head: Normocephalic and atraumatic.   Eyes: EOM are normal. Pupils are equal, round, and reactive to light.   Neck: Normal range of motion. Neck supple.   Cardiovascular: Normal rate.    Pulmonary/Chest: Effort normal and breath sounds normal.   Abdominal: Soft.   Neurological: Patient is alert and oriented to person, place, and time.   Skin: Skin is warm and dry.   Psychiatric: Patient has a normal mood and affect.   Nursing note and vitals reviewed.       [unfilled]   Left knee: 2-3+ " effusion minimal warmth skin appears normal extension loss of 5 degrees positive Jennifer's finding with reproduction of anteromedial joint line pain ligament exam stable extensor mechanism intact no weakness atrophy  or neurovascular compromise, alignment neutral.    Assessment/Plan   Review of Radiographic Studies:    Indication to evaluate joint condition, no comparison views available, shows evident chronic advanced osteoarthritis.      Procedures     Diagnoses and all orders for this visit:    1. Chronic pain of left knee (Primary)    2. Arthritis of knee  -     MRI Knee Left Without Contrast       Orthopedic activities reviewed and patient expressed appreciation and Using illustrations and models, the nature of the pathology was explained to the patient      Recommendations/Plan:   Work/Activity Status: May perform usual activities as tolerated    Patient agreeable to call or return sooner for any concerns.         Discussed and reviewed the nature of his radiographic findings of arthritis the probability of degenerative meniscal tearing options of injections versus arthroscopic debridement were reviewed explained his questions answered.  He would like to consider surgical treatment pending his MRI.    Impression:  Left knee osteoarthritis 2-3+ effusion positive mechanical findings consistent with probable degenerative meniscal tear     Plan:  MRI left knee return to see me after MR complete

## 2021-09-21 DIAGNOSIS — M25.562 CHRONIC PAIN OF LEFT KNEE: ICD-10-CM

## 2021-09-21 DIAGNOSIS — G89.29 CHRONIC PAIN OF LEFT KNEE: ICD-10-CM

## 2021-09-21 RX ORDER — MELOXICAM 7.5 MG/1
TABLET ORAL
Qty: 30 TABLET | Refills: 0 | Status: SHIPPED | OUTPATIENT
Start: 2021-09-21 | End: 2021-11-17

## 2021-10-18 DIAGNOSIS — I10 ESSENTIAL HYPERTENSION: ICD-10-CM

## 2021-10-19 RX ORDER — CARVEDILOL 6.25 MG/1
TABLET ORAL
Qty: 60 TABLET | Refills: 0 | Status: SHIPPED | OUTPATIENT
Start: 2021-10-19 | End: 2021-11-08

## 2021-11-08 DIAGNOSIS — I10 ESSENTIAL HYPERTENSION: ICD-10-CM

## 2021-11-08 RX ORDER — CARVEDILOL 6.25 MG/1
TABLET ORAL
Qty: 60 TABLET | Refills: 0 | Status: SHIPPED | OUTPATIENT
Start: 2021-11-08 | End: 2021-12-13

## 2021-11-17 DIAGNOSIS — M25.562 CHRONIC PAIN OF LEFT KNEE: ICD-10-CM

## 2021-11-17 DIAGNOSIS — G89.29 CHRONIC PAIN OF LEFT KNEE: ICD-10-CM

## 2021-11-17 RX ORDER — MELOXICAM 7.5 MG/1
TABLET ORAL
Qty: 30 TABLET | Refills: 0 | Status: SHIPPED | OUTPATIENT
Start: 2021-11-17 | End: 2021-12-17

## 2021-12-10 DIAGNOSIS — I10 ESSENTIAL HYPERTENSION: ICD-10-CM

## 2021-12-13 RX ORDER — CARVEDILOL 6.25 MG/1
TABLET ORAL
Qty: 60 TABLET | Refills: 0 | Status: SHIPPED | OUTPATIENT
Start: 2021-12-13 | End: 2022-02-01

## 2021-12-17 DIAGNOSIS — M25.562 CHRONIC PAIN OF LEFT KNEE: ICD-10-CM

## 2021-12-17 DIAGNOSIS — G89.29 CHRONIC PAIN OF LEFT KNEE: ICD-10-CM

## 2021-12-17 RX ORDER — MELOXICAM 7.5 MG/1
TABLET ORAL
Qty: 30 TABLET | Refills: 0 | Status: SHIPPED | OUTPATIENT
Start: 2021-12-17 | End: 2022-01-19

## 2022-01-03 DIAGNOSIS — I10 ESSENTIAL HYPERTENSION: ICD-10-CM

## 2022-01-03 RX ORDER — LISINOPRIL 40 MG/1
TABLET ORAL
Qty: 30 TABLET | Refills: 0 | Status: SHIPPED | OUTPATIENT
Start: 2022-01-03 | End: 2022-02-16 | Stop reason: SDUPTHER

## 2022-01-03 RX ORDER — AMLODIPINE BESYLATE 10 MG/1
TABLET ORAL
Qty: 90 TABLET | Refills: 0 | Status: SHIPPED | OUTPATIENT
Start: 2022-01-03 | End: 2022-02-16 | Stop reason: SDUPTHER

## 2022-01-03 RX ORDER — CHLORTHALIDONE 25 MG/1
TABLET ORAL
Qty: 30 TABLET | Refills: 0 | Status: SHIPPED | OUTPATIENT
Start: 2022-01-03 | End: 2022-02-16 | Stop reason: SDUPTHER

## 2022-01-06 ENCOUNTER — TELEPHONE (OUTPATIENT)
Dept: INTERNAL MEDICINE | Facility: CLINIC | Age: 52
End: 2022-01-06

## 2022-01-06 DIAGNOSIS — M54.42 CHRONIC BILATERAL LOW BACK PAIN WITH LEFT-SIDED SCIATICA: ICD-10-CM

## 2022-01-06 DIAGNOSIS — G89.29 CHRONIC BILATERAL LOW BACK PAIN WITH LEFT-SIDED SCIATICA: ICD-10-CM

## 2022-01-07 RX ORDER — GABAPENTIN 300 MG/1
CAPSULE ORAL
Qty: 60 CAPSULE | Refills: 0 | OUTPATIENT
Start: 2022-01-07

## 2022-01-07 NOTE — TELEPHONE ENCOUNTER
"Patient called after hours.  On call personnel thought he was requesting immediate treatment for lower back pain. He has chronic back and knee pain.  Lower back pain is worse, and he would like to discuss getting an MRI of his knee. He states he was trying to make an appointment with his PCP.  Has taken gabapentin, ultram in the past.  Felt gabapentin worked well.  He was shoveling snow today, and \"it's killing my body\".  Home phone number updated in the chart.  Advised I would let his PCP know he is trying to make an appointment.    Phone call was disconnected.  Patient had previously been talking about living in the [facility] and not getting good cell signal, had tried to log into [employer] and was unsuccessful.  I had already told him I would send a message to Ms Sky regarding making an appointment for evaluation.    "

## 2022-01-11 ENCOUNTER — TELEPHONE (OUTPATIENT)
Dept: FAMILY MEDICINE CLINIC | Facility: CLINIC | Age: 52
End: 2022-01-11

## 2022-01-11 NOTE — TELEPHONE ENCOUNTER
Caller: Dharmesh Samuel    Relationship: Self    Best call back number: [phone]     What was the call regarding: PATIENT STATES THAT HE WOULD LIKE A CALL ABOUT HIS GABAPENTIN MEDICATION. PATIENT ALSO STATES THAT HE CAN NOT GET INTO HIS MYCHART.    Do you require a callback: YES

## 2022-01-12 DIAGNOSIS — M54.42 CHRONIC BILATERAL LOW BACK PAIN WITH LEFT-SIDED SCIATICA: ICD-10-CM

## 2022-01-12 DIAGNOSIS — G89.29 CHRONIC BILATERAL LOW BACK PAIN WITH LEFT-SIDED SCIATICA: ICD-10-CM

## 2022-01-12 RX ORDER — GABAPENTIN 300 MG/1
CAPSULE ORAL
Qty: 60 CAPSULE | Refills: 0 | OUTPATIENT
Start: 2022-01-12

## 2022-01-12 NOTE — TELEPHONE ENCOUNTER
"I do not have him on gabapentin. Last office visit says \"gabapentin did not help his pain\". Can someone try to help him with mychart next time they talk with him. He is very hard to get ahold of due to cell reception issues  "

## 2022-01-19 DIAGNOSIS — M25.562 CHRONIC PAIN OF LEFT KNEE: ICD-10-CM

## 2022-01-19 DIAGNOSIS — G89.29 CHRONIC PAIN OF LEFT KNEE: ICD-10-CM

## 2022-01-19 RX ORDER — MELOXICAM 7.5 MG/1
TABLET ORAL
Qty: 30 TABLET | Refills: 0 | Status: SHIPPED | OUTPATIENT
Start: 2022-01-19 | End: 2022-02-01

## 2022-02-01 ENCOUNTER — OFFICE VISIT (OUTPATIENT)
Dept: FAMILY MEDICINE CLINIC | Facility: CLINIC | Age: 52
End: 2022-02-01

## 2022-02-01 VITALS
HEART RATE: 67 BPM | TEMPERATURE: 98 F | WEIGHT: 303.6 LBS | HEIGHT: 72 IN | BODY MASS INDEX: 41.12 KG/M2 | SYSTOLIC BLOOD PRESSURE: 180 MMHG | DIASTOLIC BLOOD PRESSURE: 110 MMHG | OXYGEN SATURATION: 96 %

## 2022-02-01 DIAGNOSIS — M54.16 LUMBAR RADICULOPATHY: ICD-10-CM

## 2022-02-01 DIAGNOSIS — M25.562 CHRONIC PAIN OF LEFT KNEE: ICD-10-CM

## 2022-02-01 DIAGNOSIS — I10 ESSENTIAL HYPERTENSION: Primary | ICD-10-CM

## 2022-02-01 DIAGNOSIS — M54.42 CHRONIC BILATERAL LOW BACK PAIN WITH LEFT-SIDED SCIATICA: ICD-10-CM

## 2022-02-01 DIAGNOSIS — G89.29 CHRONIC BILATERAL LOW BACK PAIN WITH LEFT-SIDED SCIATICA: ICD-10-CM

## 2022-02-01 DIAGNOSIS — G47.33 OSA (OBSTRUCTIVE SLEEP APNEA): ICD-10-CM

## 2022-02-01 DIAGNOSIS — M17.10 ARTHRITIS OF KNEE: ICD-10-CM

## 2022-02-01 DIAGNOSIS — E66.01 CLASS 3 SEVERE OBESITY DUE TO EXCESS CALORIES WITH SERIOUS COMORBIDITY AND BODY MASS INDEX (BMI) OF 40.0 TO 44.9 IN ADULT: ICD-10-CM

## 2022-02-01 DIAGNOSIS — G89.29 CHRONIC PAIN OF LEFT KNEE: ICD-10-CM

## 2022-02-01 PROCEDURE — 99214 OFFICE O/P EST MOD 30 MIN: CPT | Performed by: NURSE PRACTITIONER

## 2022-02-01 RX ORDER — GABAPENTIN 400 MG/1
400 CAPSULE ORAL 2 TIMES DAILY
Qty: 60 CAPSULE | Refills: 1 | Status: SHIPPED | OUTPATIENT
Start: 2022-02-01 | End: 2022-02-16

## 2022-02-01 RX ORDER — MELOXICAM 15 MG/1
15 TABLET ORAL DAILY
Qty: 30 TABLET | Refills: 3 | Status: SHIPPED | OUTPATIENT
Start: 2022-02-01 | End: 2022-02-22

## 2022-02-01 RX ORDER — CARVEDILOL 12.5 MG/1
12.5 TABLET ORAL 2 TIMES DAILY WITH MEALS
Qty: 60 TABLET | Refills: 3 | Status: SHIPPED | OUTPATIENT
Start: 2022-02-01 | End: 2022-04-05

## 2022-02-01 NOTE — PROGRESS NOTES
Subjective     Chief Complaint:    Chief Complaint   Patient presents with   • Back Pain   • Sleeping Problem       History of Present Illness:   Still with back pain, low back, shoots down his legs, feels like pins and needles. Has long hx of back pain, failed PT and injections. Last MRI 5 years ago. Saw surgeon at that time but no surgery performed. Tramadol did not help. He would like to try gabapentin again    Was diagnosed with sleep apnea but has not had machine. Sleep study on file from 2019    HTN, on coreg, norvasc, ace, chlorthalidone. At times feels like his face was hot but denies any other symptoms    Has mild depression that is controlled on zoloft    Has OA, worse in knees, would like to go up on mobic        Review of Systems  Gen- No fevers, chills  CV- No chest pain, palpitations  Resp- No cough, dyspnea  GI- No N/V/D, abd pain  Neuro-No dizziness, headaches      I have reviewed and/or updated the patient's past medical, surgical, family, social history and problem list as appropriate.     Medications:    Current Outpatient Medications:   •  amLODIPine (NORVASC) 10 MG tablet, Take 1 tablet by mouth once daily, Disp: 90 tablet, Rfl: 0  •  buPROPion SR (Wellbutrin SR) 100 MG 12 hr tablet, Take 1 tablet by mouth Every Morning., Disp: 30 tablet, Rfl: 1  •  carvedilol (COREG) 12.5 MG tablet, Take 1 tablet by mouth 2 (Two) Times a Day With Meals., Disp: 60 tablet, Rfl: 3  •  chlorthalidone (HYGROTON) 25 MG tablet, Take 1 tablet by mouth once daily, Disp: 30 tablet, Rfl: 0  •  lisinopril (PRINIVIL,ZESTRIL) 40 MG tablet, Take 1 tablet by mouth once daily, Disp: 30 tablet, Rfl: 0  •  sertraline (Zoloft) 50 MG tablet, Take 1 tablet by mouth Daily., Disp: 90 tablet, Rfl: 1  •  gabapentin (NEURONTIN) 400 MG capsule, Take 1 capsule by mouth 2 (Two) Times a Day., Disp: 60 capsule, Rfl: 1  •  meloxicam (Mobic) 15 MG tablet, Take 1 tablet by mouth Daily., Disp: 30 tablet, Rfl: 3    Allergies:  No Known  "Allergies    Objective     Vital Signs:   Vitals:    02/01/22 1316   BP: (!) 180/110   Pulse: 67   Temp: 98 °F (36.7 °C)   SpO2: 96%   Weight: (!) 138 kg (303 lb 9.6 oz)   Height: 182.9 cm (72\")     Body mass index is 41.18 kg/m².    Physical Exam:    Physical Exam  Vitals and nursing note reviewed.   Constitutional:       Appearance: He is well-developed. He is obese.   HENT:      Head: Normocephalic and atraumatic.   Eyes:      Pupils: Pupils are equal, round, and reactive to light.   Neck:      Vascular: Carotid bruit present.   Cardiovascular:      Rate and Rhythm: Normal rate and regular rhythm.      Heart sounds: Normal heart sounds.   Pulmonary:      Effort: Pulmonary effort is normal.      Breath sounds: Normal breath sounds.   Abdominal:      General: Bowel sounds are normal. There is no distension.      Palpations: Abdomen is soft.      Tenderness: There is no abdominal tenderness.   Musculoskeletal:      Cervical back: Neck supple.   Skin:     General: Skin is warm and dry.      Capillary Refill: Capillary refill takes less than 2 seconds.   Neurological:      General: No focal deficit present.      Mental Status: He is alert and oriented to person, place, and time.   Psychiatric:         Mood and Affect: Mood normal.         Behavior: Behavior normal.         Assessment / Plan     Assessment/Plan:   Problem List Items Addressed This Visit        Cardiac and Vasculature    Essential hypertension - Primary    Relevant Medications    chlorthalidone (HYGROTON) 25 MG tablet    lisinopril (PRINIVIL,ZESTRIL) 40 MG tablet    amLODIPine (NORVASC) 10 MG tablet    carvedilol (COREG) 12.5 MG tablet       Endocrine and Metabolic    Class 3 severe obesity due to excess calories with serious comorbidity and body mass index (BMI) of 40.0 to 44.9 in adult (HCC)    Relevant Medications    buPROPion SR (Wellbutrin SR) 100 MG 12 hr tablet       Musculoskeletal and Injuries    Chronic pain of left knee    Relevant " Medications    meloxicam (Mobic) 15 MG tablet    Chronic bilateral low back pain with left-sided sciatica    Relevant Medications    gabapentin (NEURONTIN) 400 MG capsule    Other Relevant Orders    MRI Lumbar Spine Without Contrast    Arthritis of knee      Other Visit Diagnoses     MADELAINE (obstructive sleep apnea)        Relevant Orders    CPAP Therapy    Lumbar radiculopathy        Relevant Orders    MRI Lumbar Spine Without Contrast        -- BP uncontrolled, increase coreg, continue other meds  -- diet discussed, encouraged to cut back on etoh use  -- will check MRI of back, restart gabapentin, continue mobic  -- labs next visit  -- reorder CPAP    Follow up:  2-3 weeks    Electronically signed by AMY Escamilla   02/01/2022 13:40 EST      Please note that portions of this note may have been completed with a voice recognition program. Efforts were made to edit the dictations, but occasionally words are mistranscribed.  Answers for HPI/ROS submitted by the patient on 2/1/2022  What is the primary reason for your visit?: Back Pain  Chronicity: chronic  Onset: more than 1 month ago  Frequency: constantly  Progression since onset: unchanged  Pain location: gluteal, lumbar spine, sacro-iliac, thoracic spine  Pain quality: aching, burning, cramping, shooting, stabbing  Radiates to: left knee, left thigh, right thigh  Pain - numeric: 10/10  Pain is: the same all the time  Aggravated by: bending, position, lying down, sitting, standing, twisting  Stiffness is present: all day  leg pain: Yes  paresthesias: Yes  tingling: Yes  Risk factors: lack of exercise, obesity, poor posture, recent trauma, sedentary lifestyle

## 2022-02-14 DIAGNOSIS — F33.0 MILD EPISODE OF RECURRENT MAJOR DEPRESSIVE DISORDER: ICD-10-CM

## 2022-02-16 ENCOUNTER — OFFICE VISIT (OUTPATIENT)
Dept: FAMILY MEDICINE CLINIC | Facility: CLINIC | Age: 52
End: 2022-02-16

## 2022-02-16 VITALS
HEIGHT: 72 IN | DIASTOLIC BLOOD PRESSURE: 100 MMHG | HEART RATE: 71 BPM | OXYGEN SATURATION: 96 % | SYSTOLIC BLOOD PRESSURE: 165 MMHG | TEMPERATURE: 96.4 F | BODY MASS INDEX: 41.85 KG/M2 | WEIGHT: 309 LBS

## 2022-02-16 DIAGNOSIS — G89.29 CHRONIC BILATERAL LOW BACK PAIN WITH LEFT-SIDED SCIATICA: ICD-10-CM

## 2022-02-16 DIAGNOSIS — R73.03 PRE-DIABETES: Primary | ICD-10-CM

## 2022-02-16 DIAGNOSIS — Z79.899 ENCOUNTER FOR LONG-TERM (CURRENT) USE OF OTHER MEDICATIONS: ICD-10-CM

## 2022-02-16 DIAGNOSIS — I10 ESSENTIAL HYPERTENSION: ICD-10-CM

## 2022-02-16 DIAGNOSIS — M54.42 CHRONIC BILATERAL LOW BACK PAIN WITH LEFT-SIDED SCIATICA: ICD-10-CM

## 2022-02-16 PROCEDURE — 99213 OFFICE O/P EST LOW 20 MIN: CPT | Performed by: NURSE PRACTITIONER

## 2022-02-16 RX ORDER — AMLODIPINE BESYLATE 10 MG/1
10 TABLET ORAL DAILY
Qty: 90 TABLET | Refills: 1 | Status: SHIPPED | OUTPATIENT
Start: 2022-02-16 | End: 2022-07-25 | Stop reason: SDUPTHER

## 2022-02-16 RX ORDER — CHLORTHALIDONE 25 MG/1
25 TABLET ORAL DAILY
Qty: 90 TABLET | Refills: 1 | Status: SHIPPED | OUTPATIENT
Start: 2022-02-16 | End: 2022-08-08

## 2022-02-16 RX ORDER — GABAPENTIN 600 MG/1
600 TABLET ORAL 3 TIMES DAILY
Qty: 90 TABLET | Refills: 1 | Status: SHIPPED | OUTPATIENT
Start: 2022-02-16 | End: 2022-05-31 | Stop reason: SDUPTHER

## 2022-02-16 RX ORDER — LISINOPRIL 40 MG/1
40 TABLET ORAL DAILY
Qty: 90 TABLET | Refills: 1 | Status: SHIPPED | OUTPATIENT
Start: 2022-02-16 | End: 2022-08-08

## 2022-02-17 LAB
ALBUMIN SERPL-MCNC: 4.1 G/DL (ref 3.5–5.2)
ALBUMIN/GLOB SERPL: 1.1 G/DL
ALP SERPL-CCNC: 91 U/L (ref 39–117)
ALT SERPL-CCNC: 52 U/L (ref 1–41)
AST SERPL-CCNC: 39 U/L (ref 1–40)
BASOPHILS # BLD AUTO: 0.07 10*3/MM3 (ref 0–0.2)
BASOPHILS NFR BLD AUTO: 1 % (ref 0–1.5)
BILIRUB SERPL-MCNC: 0.4 MG/DL (ref 0–1.2)
BUN SERPL-MCNC: 33 MG/DL (ref 6–20)
BUN/CREAT SERPL: 24.4 (ref 7–25)
CALCIUM SERPL-MCNC: 10 MG/DL (ref 8.6–10.5)
CHLORIDE SERPL-SCNC: 98 MMOL/L (ref 98–107)
CHOLEST SERPL-MCNC: 160 MG/DL (ref 0–200)
CO2 SERPL-SCNC: 27 MMOL/L (ref 22–29)
CREAT SERPL-MCNC: 1.35 MG/DL (ref 0.76–1.27)
EOSINOPHIL # BLD AUTO: 0.43 10*3/MM3 (ref 0–0.4)
EOSINOPHIL NFR BLD AUTO: 6.3 % (ref 0.3–6.2)
ERYTHROCYTE [DISTWIDTH] IN BLOOD BY AUTOMATED COUNT: 13.7 % (ref 12.3–15.4)
GLOBULIN SER CALC-MCNC: 3.9 GM/DL
GLUCOSE SERPL-MCNC: 109 MG/DL (ref 65–99)
HBA1C MFR BLD: 5.9 % (ref 4.8–5.6)
HCT VFR BLD AUTO: 41 % (ref 37.5–51)
HDLC SERPL-MCNC: 37 MG/DL (ref 40–60)
HGB BLD-MCNC: 14.2 G/DL (ref 13–17.7)
IMM GRANULOCYTES # BLD AUTO: 0.01 10*3/MM3 (ref 0–0.05)
IMM GRANULOCYTES NFR BLD AUTO: 0.1 % (ref 0–0.5)
LDLC SERPL CALC-MCNC: 87 MG/DL (ref 0–100)
LYMPHOCYTES # BLD AUTO: 2.14 10*3/MM3 (ref 0.7–3.1)
LYMPHOCYTES NFR BLD AUTO: 31.5 % (ref 19.6–45.3)
MCH RBC QN AUTO: 34.5 PG (ref 26.6–33)
MCHC RBC AUTO-ENTMCNC: 34.6 G/DL (ref 31.5–35.7)
MCV RBC AUTO: 99.8 FL (ref 79–97)
MONOCYTES # BLD AUTO: 0.77 10*3/MM3 (ref 0.1–0.9)
MONOCYTES NFR BLD AUTO: 11.3 % (ref 5–12)
NEUTROPHILS # BLD AUTO: 3.37 10*3/MM3 (ref 1.7–7)
NEUTROPHILS NFR BLD AUTO: 49.8 % (ref 42.7–76)
NRBC BLD AUTO-RTO: 0 /100 WBC (ref 0–0.2)
PLATELET # BLD AUTO: 283 10*3/MM3 (ref 140–450)
POTASSIUM SERPL-SCNC: 4.6 MMOL/L (ref 3.5–5.2)
PROT SERPL-MCNC: 8 G/DL (ref 6–8.5)
RBC # BLD AUTO: 4.11 10*6/MM3 (ref 4.14–5.8)
SODIUM SERPL-SCNC: 135 MMOL/L (ref 136–145)
TRIGL SERPL-MCNC: 214 MG/DL (ref 0–150)
VLDLC SERPL CALC-MCNC: 36 MG/DL (ref 5–40)
WBC # BLD AUTO: 6.79 10*3/MM3 (ref 3.4–10.8)

## 2022-02-22 ENCOUNTER — HOSPITAL ENCOUNTER (OUTPATIENT)
Dept: MRI IMAGING | Facility: HOSPITAL | Age: 52
Discharge: HOME | End: 2022-02-22
Admitting: NURSE PRACTITIONER

## 2022-02-22 DIAGNOSIS — M48.062 SPINAL STENOSIS OF LUMBAR REGION WITH NEUROGENIC CLAUDICATION: ICD-10-CM

## 2022-02-22 DIAGNOSIS — G89.29 CHRONIC BILATERAL LOW BACK PAIN WITH LEFT-SIDED SCIATICA: Primary | ICD-10-CM

## 2022-02-22 DIAGNOSIS — M54.42 CHRONIC BILATERAL LOW BACK PAIN WITH LEFT-SIDED SCIATICA: ICD-10-CM

## 2022-02-22 DIAGNOSIS — M43.16 ANTEROLISTHESIS OF LUMBAR SPINE: ICD-10-CM

## 2022-02-22 DIAGNOSIS — M54.16 LUMBAR RADICULOPATHY: ICD-10-CM

## 2022-02-22 DIAGNOSIS — M54.42 CHRONIC BILATERAL LOW BACK PAIN WITH LEFT-SIDED SCIATICA: Primary | ICD-10-CM

## 2022-02-22 DIAGNOSIS — G89.29 CHRONIC BILATERAL LOW BACK PAIN WITH LEFT-SIDED SCIATICA: ICD-10-CM

## 2022-02-22 LAB — DRUGS UR: NORMAL

## 2022-02-22 PROCEDURE — 72148 MRI LUMBAR SPINE W/O DYE: CPT

## 2022-02-22 NOTE — PROGRESS NOTES
Dharmesh,Your MRI of your back shows slipping of disks, arthritis, and mild spinal stenosis.  I recommend checking in with neurosurgery to see if they feel this is something that could be corrected with surgery.  Otherwise treatment would be physical therapy and pain management referral.  Neurosurgery referral placed and will go from there.  Thanks  Michelle

## 2022-03-03 ENCOUNTER — TELEPHONE (OUTPATIENT)
Dept: FAMILY MEDICINE CLINIC | Facility: CLINIC | Age: 52
End: 2022-03-03

## 2022-03-03 NOTE — TELEPHONE ENCOUNTER
Caller: Dharmesh Samuel    Relationship: Self    Best call back number:     [phone]     What is the best time to reach you:     ANY TIME    Who are you requesting to speak with (clinical staff, provider,  specific staff member):     ALEX BERRIOS    Do you know the name of the person who called:     N/A    What was the call regarding:     PATIENT STATED HE IS HAVING CONTINUED ISSUES WITH RECEIVING A BI-PAP MACHINE    PATIENT REQUESTED A CALL BACK FROM ALEX BERRIOS TO RESOLVE THESE ISSUES    Do you require a callback:     YES, ASAP    ALEX BERRIOS

## 2022-03-10 NOTE — TELEPHONE ENCOUNTER
Pt continues to have issues receiving his equipment. I have left a message with Tanya at [facility] and also called our direct rep for [facility], Audie. He advised that he will look in to this for me and get in touch with me later today with an update. [employer] message has been sent to patient.

## 2022-03-11 NOTE — TELEPHONE ENCOUNTER
Per call back from Audie at [facility], he attempted to contact patient by telephone yesterday,but was unable to reach him and no voicemail. Sts that pt equipment shipped out yesterday PM and should arrive to him in a few days. I have sent a [employer] message to patient re: this as well.

## 2022-03-11 NOTE — TELEPHONE ENCOUNTER
I have left another message with Audie at [facility] to contact me re: this since I did not get any additional information from him yesterday on the status.

## 2022-03-21 NOTE — TELEPHONE ENCOUNTER
Patient called me about this today. He states that he has spoken with Audie at [facility] and everything is straightened out and he should be getting his equipment this week. I advised him to let us know if he needs anything further, or has any more issues with receiving his equipment.

## 2022-03-22 ENCOUNTER — OFFICE VISIT (OUTPATIENT)
Dept: NEUROSURGERY | Facility: CLINIC | Age: 52
End: 2022-03-22

## 2022-03-22 VITALS — WEIGHT: 305 LBS | HEIGHT: 72 IN | BODY MASS INDEX: 41.31 KG/M2 | TEMPERATURE: 98.9 F

## 2022-03-22 DIAGNOSIS — M54.9 MECHANICAL BACK PAIN: Primary | ICD-10-CM

## 2022-03-22 DIAGNOSIS — R20.0 ARM NUMBNESS: ICD-10-CM

## 2022-03-22 DIAGNOSIS — M43.16 SPONDYLOLISTHESIS OF LUMBAR REGION: ICD-10-CM

## 2022-03-22 DIAGNOSIS — M51.36 DDD (DEGENERATIVE DISC DISEASE), LUMBAR: ICD-10-CM

## 2022-03-22 PROCEDURE — 99204 OFFICE O/P NEW MOD 45 MIN: CPT | Performed by: NEUROLOGICAL SURGERY

## 2022-03-22 RX ORDER — MELOXICAM 15 MG/1
15 TABLET ORAL DAILY
COMMUNITY
Start: 2022-03-01 | End: 2022-04-05

## 2022-03-22 NOTE — PROGRESS NOTES
Patient: Dharmesh Samuel  : 1970    Primary Care Provider: Michelle Sky APRN    Requesting Provider: As above        History    Chief Complaint:   1.  Chronic progressive low back pain.  2.  Weakness and numbness in the hands.    History of Present Illness: Mr. Samuel is a 51-year-old former  who has not worked in several years.  Has had a many year history of progressive lower back pain.  At one point he did injure himself at work.  That was 4-5 years ago by his report.  He is worse lying down or walking.  By the end of the day his symptoms are more bothersome.  He complains of generalized dysesthesias involving his thighs.  He has a bad left knee.  He has no bowel or bladder dysfunction.  He complains of weakness in his hands as well as sensory alteration involving the ulnar digits of each hand.  He has not undergone any physical therapy.  He has been on gabapentin.    Review of Systems   Constitutional: Positive for fatigue. Negative for activity change, appetite change, chills, diaphoresis, fever and unexpected weight change.   HENT: Positive for dental problem, sinus pressure and sneezing. Negative for congestion, drooling, ear discharge, ear pain, facial swelling, hearing loss, mouth sores, nosebleeds, postnasal drip, rhinorrhea, sinus pain, sore throat, tinnitus, trouble swallowing and voice change.    Eyes: Positive for redness. Negative for photophobia, pain, discharge, itching and visual disturbance.   Respiratory: Negative for apnea, cough, choking, chest tightness, shortness of breath, wheezing and stridor.    Cardiovascular: Negative for chest pain, palpitations and leg swelling.   Gastrointestinal: Negative for abdominal distention, abdominal pain, anal bleeding, blood in stool, constipation, diarrhea, nausea, rectal pain and vomiting.   Endocrine: Negative for cold intolerance, heat intolerance, polydipsia, polyphagia and polyuria.   Genitourinary: Negative for decreased urine volume,  "difficulty urinating, dysuria, enuresis, flank pain, frequency, genital sores, hematuria, penile discharge, penile pain, penile swelling, scrotal swelling, testicular pain and urgency.   Musculoskeletal: Positive for back pain and joint swelling. Negative for arthralgias, gait problem, myalgias, neck pain and neck stiffness.   Skin: Negative for color change, pallor, rash and wound.   Allergic/Immunologic: Negative for environmental allergies, food allergies and immunocompromised state.   Neurological: Negative for dizziness, tremors, seizures, syncope, facial asymmetry, speech difficulty, weakness, light-headedness, numbness and headaches.   Hematological: Negative for adenopathy. Does not bruise/bleed easily.   Psychiatric/Behavioral: Positive for agitation and sleep disturbance. Negative for behavioral problems, confusion, decreased concentration, dysphoric mood, hallucinations, self-injury and suicidal ideas. The patient is not nervous/anxious and is not hyperactive.        The patient's past medical history, past surgical history, family history, and social history have been reviewed at length in the electronic medical record.    Physical Exam:   Temp 98.9 °F (37.2 °C)   Ht 182.9 cm (72\")   Wt (!) 138 kg (305 lb)   BMI 41.37 kg/m²   CONSTITUTIONAL: Patient is well-nourished, pleasant and appears stated age.  MUSCULOSKELETAL:  Neck tenderness to palpation is not observed.   ROM in the neck is normal.  There is no tenderness over the ulnar grooves.  Straight leg raising induces hamstring tightness only.  NEUROLOGICAL:  Orientation, memory, attention span, language function, and cognition have been examined and are intact.  Strength is intact in the upper and lower extremities to direct testing.  Muscle tone is normal throughout.  Station and gait are normal.  Sensation is intact to light touch testing throughout.  Deep tendon reflexes are difficult to elicit throughout..  Uziel's Sign is negative " bilaterally. No clonus is elicited.  Coordination is intact.      Medical Decision Making    Data Review:   (All imaging studies were personally reviewed unless stated otherwise)  MRI of the lumbar spine dated 2/22/2022 demonstrates some diffuse degenerative disc disease and facet arthropathy.  There is mild stenosis at L2-3.  There is quite generous stenosis at L3-4 and more moderate stenosis at L4-5.  There is a grade 1 listhesis of L5 on S1.    Diagnosis:   1.  Chronic mechanical low back pain.  2.  Possible ulnar neuropathy at the elbows.  3.  Lumbar stenosis without neurogenic claudication.  4.  Morbid obesity.    Treatment Options:   I am going to check electrodiagnostic studies of his upper extremities and obtain flexion-extension plain films of his lumbar spine to rule out overt instability.  Further recommendations will then ensue.  I do not see anything in the setting that I would consider disabling.       Diagnosis Plan   1. Mechanical back pain     2. Spondylolisthesis of lumbar region     3. DDD (degenerative disc disease), lumbar     4. Arm numbness         Scribed for Augusto Van MD by Miriam Schaefer [facility] 3/22/2022 13:29 EDT      I, Dr. Van, personally performed the services described in the documentation, as scribed in my presence, and it is both accurate and complete.

## 2022-04-05 ENCOUNTER — OFFICE VISIT (OUTPATIENT)
Dept: FAMILY MEDICINE CLINIC | Facility: CLINIC | Age: 52
End: 2022-04-05

## 2022-04-05 VITALS
TEMPERATURE: 98 F | WEIGHT: 308.6 LBS | SYSTOLIC BLOOD PRESSURE: 155 MMHG | OXYGEN SATURATION: 95 % | DIASTOLIC BLOOD PRESSURE: 100 MMHG | HEIGHT: 72 IN | BODY MASS INDEX: 41.8 KG/M2 | HEART RATE: 79 BPM

## 2022-04-05 DIAGNOSIS — M25.532 LEFT WRIST PAIN: ICD-10-CM

## 2022-04-05 DIAGNOSIS — G89.29 CHRONIC PAIN OF LEFT KNEE: ICD-10-CM

## 2022-04-05 DIAGNOSIS — R79.89 ABNORMAL BLOOD CREATININE LEVEL: ICD-10-CM

## 2022-04-05 DIAGNOSIS — M25.562 CHRONIC PAIN OF LEFT KNEE: ICD-10-CM

## 2022-04-05 DIAGNOSIS — M25.50 MULTIPLE JOINT PAIN: ICD-10-CM

## 2022-04-05 DIAGNOSIS — I10 ESSENTIAL HYPERTENSION: Primary | ICD-10-CM

## 2022-04-05 PROCEDURE — 99214 OFFICE O/P EST MOD 30 MIN: CPT | Performed by: NURSE PRACTITIONER

## 2022-04-05 RX ORDER — CARVEDILOL 25 MG/1
25 TABLET ORAL 2 TIMES DAILY WITH MEALS
Qty: 60 TABLET | Refills: 2 | Status: SHIPPED | OUTPATIENT
Start: 2022-04-05 | End: 2022-06-27

## 2022-04-05 RX ORDER — METHYLPREDNISOLONE 4 MG/1
TABLET ORAL
Qty: 21 TABLET | Refills: 0 | Status: SHIPPED | OUTPATIENT
Start: 2022-04-05 | End: 2022-08-10

## 2022-04-05 NOTE — PROGRESS NOTES
"      Subjective     Chief Complaint:    Chief Complaint   Patient presents with   • Hypertension       History of Present Illness:   HTN, on coreg, chlorthalidone, norvasc.  MADELAINE, on CPAP  Elevated creatinine, was taking mobic and naproxen.   Has worsening left wrist pain, he is right handed, notes swelling, no injury  Has left knee pain that is chronic  Has bilateral shoulder pain, numbness in both 45h and 5th digits, has hand pain  Has lumbar disc issues, following with NS    Review of Systems  Gen- No fevers, chills  CV- No chest pain, palpitations  Resp- No cough, dyspnea  GI- No N/V/D, abd pain  Neuro-No dizziness, headaches      I have reviewed and/or updated the patient's past medical, surgical, family, social history and problem list as appropriate.     Medications:    Current Outpatient Medications:   •  amLODIPine (NORVASC) 10 MG tablet, Take 1 tablet by mouth Daily., Disp: 90 tablet, Rfl: 1  •  carvedilol (COREG) 25 MG tablet, Take 1 tablet by mouth 2 (Two) Times a Day With Meals., Disp: 60 tablet, Rfl: 2  •  chlorthalidone (HYGROTON) 25 MG tablet, Take 1 tablet by mouth Daily., Disp: 90 tablet, Rfl: 1  •  gabapentin (NEURONTIN) 600 MG tablet, Take 1 tablet by mouth 3 (Three) Times a Day., Disp: 90 tablet, Rfl: 1  •  lisinopril (PRINIVIL,ZESTRIL) 40 MG tablet, Take 1 tablet by mouth Daily., Disp: 90 tablet, Rfl: 1  •  sertraline (ZOLOFT) 50 MG tablet, Take 1 tablet by mouth once daily, Disp: 30 tablet, Rfl: 0  •  methylPREDNISolone (MEDROL) 4 MG dose pack, Take as directed on package instructions., Disp: 21 tablet, Rfl: 0    Allergies:  No Known Allergies    Objective     Vital Signs:   Vitals:    04/05/22 1324   BP: 155/100   Pulse: 79   Temp: 98 °F (36.7 °C)   SpO2: 95%   Weight: (!) 140 kg (308 lb 9.6 oz)   Height: 182.9 cm (72\")   PainSc:   9     Body mass index is 41.85 kg/m².    Physical Exam:    Physical Exam  Vitals and nursing note reviewed.   Constitutional:       Appearance: He is " well-developed.   HENT:      Head: Normocephalic and atraumatic.   Eyes:      Pupils: Pupils are equal, round, and reactive to light.   Cardiovascular:      Rate and Rhythm: Normal rate and regular rhythm.      Heart sounds: Normal heart sounds.   Pulmonary:      Effort: Pulmonary effort is normal.      Breath sounds: Normal breath sounds.   Abdominal:      General: Bowel sounds are normal. There is no distension.      Palpations: Abdomen is soft.      Tenderness: There is no abdominal tenderness.   Musculoskeletal:      Right wrist: Tenderness present. Decreased range of motion.      Cervical back: Neck supple.   Skin:     General: Skin is warm and dry.   Neurological:      General: No focal deficit present.      Mental Status: He is alert and oriented to person, place, and time.   Psychiatric:         Mood and Affect: Mood normal.         Behavior: Behavior normal.         Assessment / Plan     Assessment/Plan:   Problem List Items Addressed This Visit        Cardiac and Vasculature    Essential hypertension - Primary    Relevant Medications    chlorthalidone (HYGROTON) 25 MG tablet    lisinopril (PRINIVIL,ZESTRIL) 40 MG tablet    amLODIPine (NORVASC) 10 MG tablet    carvedilol (COREG) 25 MG tablet       Musculoskeletal and Injuries    Chronic pain of left knee    Relevant Medications    methylPREDNISolone (MEDROL) 4 MG dose pack    Other Relevant Orders    ALFREDO With / DsDNA, RNP, Sjogrens A / B, Smith    Sedimentation Rate    C-reactive Protein    Rheumatoid Factor, Quant      Other Visit Diagnoses     Abnormal blood creatinine level        Relevant Orders    Basic Metabolic Panel    Left wrist pain        Relevant Medications    methylPREDNISolone (MEDROL) 4 MG dose pack    Other Relevant Orders    ALFREDO With / DsDNA, RNP, Sjogrens A / B, Smith    Sedimentation Rate    C-reactive Protein    Rheumatoid Factor, Quant    Multiple joint pain        Relevant Medications    methylPREDNISolone (MEDROL) 4 MG dose pack     Other Relevant Orders    ALFREDO With / DsDNA, RNP, Sjogrens A / B, Smith    Sedimentation Rate    C-reactive Protein    Rheumatoid Factor, Quant          -- BP not controlled, increase coreg  -- check labs as above, medrol DP for acute joint pain    Follow up:  Return in about 3 months (around 7/5/2022).      Electronically signed by AMY Escamilla   04/05/2022 13:33 EDT      Please note that portions of this note may have been completed with a voice recognition program. Efforts were made to edit the dictations, but occasionally words are mistranscribed.

## 2022-04-06 LAB
ANA SER QL: POSITIVE
BUN SERPL-MCNC: 33 MG/DL (ref 6–20)
BUN/CREAT SERPL: 25 (ref 7–25)
CALCIUM SERPL-MCNC: 9.6 MG/DL (ref 8.6–10.5)
CENTROMERE B AB SER-ACNC: <0.2 AI (ref 0–0.9)
CHLORIDE SERPL-SCNC: 97 MMOL/L (ref 98–107)
CHROMATIN AB SERPL-ACNC: <0.2 AI (ref 0–0.9)
CO2 SERPL-SCNC: 26 MMOL/L (ref 22–29)
CREAT SERPL-MCNC: 1.32 MG/DL (ref 0.76–1.27)
CRP SERPL-MCNC: 0.45 MG/DL (ref 0–0.5)
DSDNA AB SER-ACNC: 4 IU/ML (ref 0–9)
EGFRCR SERPLBLD CKD-EPI 2021: 65.7 ML/MIN/1.73
ENA JO1 AB SER-ACNC: <0.2 AI (ref 0–0.9)
ENA RNP AB SER-ACNC: 0.2 AI (ref 0–0.9)
ENA SCL70 AB SER-ACNC: <0.2 AI (ref 0–0.9)
ENA SM AB SER-ACNC: <0.2 AI (ref 0–0.9)
ENA SS-A AB SER-ACNC: >8 AI (ref 0–0.9)
ENA SS-B AB SER-ACNC: <0.2 AI (ref 0–0.9)
ERYTHROCYTE [SEDIMENTATION RATE] IN BLOOD BY WESTERGREN METHOD: 38 MM/HR (ref 0–15)
GLUCOSE SERPL-MCNC: 94 MG/DL (ref 65–99)
Lab: ABNORMAL
POTASSIUM SERPL-SCNC: 4.2 MMOL/L (ref 3.5–5.2)
RHEUMATOID FACT SERPL-ACNC: <10 IU/ML
SODIUM SERPL-SCNC: 136 MMOL/L (ref 136–145)

## 2022-04-08 DIAGNOSIS — M25.50 MULTIPLE JOINT PAIN: ICD-10-CM

## 2022-04-08 DIAGNOSIS — R76.8 POSITIVE ANA (ANTINUCLEAR ANTIBODY): Primary | ICD-10-CM

## 2022-04-08 DIAGNOSIS — R70.0 ELEVATED SED RATE: ICD-10-CM

## 2022-04-08 NOTE — PROGRESS NOTES
Dharmesh,   Your labs show that your kidney numbers are the same. Lets just monitor for now. Continue to avoid naproxen and ibuprofen and meloxicam.  A few of your other labs were abnormal. You ALFREDO was positive. This can be positive for an array of reasons but is also seen in autoimmune disorders. You sed rate was high indicating inflammation. I am going to refer you to a rheumatologist to see if you have an autoimmune disorder that is causing your joint pain. Did the steroids help your pain?  Thanks  Michelle

## 2022-05-05 DIAGNOSIS — F33.0 MILD EPISODE OF RECURRENT MAJOR DEPRESSIVE DISORDER: ICD-10-CM

## 2022-05-29 DIAGNOSIS — M25.562 CHRONIC PAIN OF LEFT KNEE: ICD-10-CM

## 2022-05-29 DIAGNOSIS — G89.29 CHRONIC PAIN OF LEFT KNEE: ICD-10-CM

## 2022-05-31 DIAGNOSIS — M54.42 CHRONIC BILATERAL LOW BACK PAIN WITH LEFT-SIDED SCIATICA: ICD-10-CM

## 2022-05-31 DIAGNOSIS — G89.29 CHRONIC BILATERAL LOW BACK PAIN WITH LEFT-SIDED SCIATICA: ICD-10-CM

## 2022-05-31 RX ORDER — MELOXICAM 15 MG/1
TABLET ORAL
Qty: 30 TABLET | Refills: 0 | Status: SHIPPED | OUTPATIENT
Start: 2022-05-31 | End: 2022-08-10

## 2022-05-31 RX ORDER — GABAPENTIN 600 MG/1
600 TABLET ORAL 3 TIMES DAILY
Qty: 90 TABLET | Refills: 2 | Status: SHIPPED | OUTPATIENT
Start: 2022-05-31 | End: 2022-07-25 | Stop reason: SDUPTHER

## 2022-06-06 DIAGNOSIS — F33.0 MILD EPISODE OF RECURRENT MAJOR DEPRESSIVE DISORDER: ICD-10-CM

## 2022-06-25 DIAGNOSIS — I10 ESSENTIAL HYPERTENSION: ICD-10-CM

## 2022-06-27 RX ORDER — CARVEDILOL 25 MG/1
TABLET ORAL
Qty: 60 TABLET | Refills: 0 | Status: SHIPPED | OUTPATIENT
Start: 2022-06-27 | End: 2022-08-10 | Stop reason: SDUPTHER

## 2022-07-11 DIAGNOSIS — F33.0 MILD EPISODE OF RECURRENT MAJOR DEPRESSIVE DISORDER: ICD-10-CM

## 2022-07-25 DIAGNOSIS — M54.42 CHRONIC BILATERAL LOW BACK PAIN WITH LEFT-SIDED SCIATICA: ICD-10-CM

## 2022-07-25 DIAGNOSIS — G89.29 CHRONIC BILATERAL LOW BACK PAIN WITH LEFT-SIDED SCIATICA: ICD-10-CM

## 2022-07-25 DIAGNOSIS — I10 ESSENTIAL HYPERTENSION: ICD-10-CM

## 2022-07-25 RX ORDER — AMLODIPINE BESYLATE 10 MG/1
10 TABLET ORAL DAILY
Qty: 90 TABLET | Refills: 1 | Status: SHIPPED | OUTPATIENT
Start: 2022-07-25 | End: 2022-09-26 | Stop reason: SDUPTHER

## 2022-07-25 NOTE — TELEPHONE ENCOUNTER
Caller: Dharmesh Samuel    Relationship: Self    Best call back number: [phone]    Requested Prescriptions:   Requested Prescriptions     Pending Prescriptions Disp Refills   • gabapentin (NEURONTIN) 600 MG tablet 90 tablet 2     Sig: Take 1 tablet by mouth 3 (Three) Times a Day.   • amLODIPine (NORVASC) 10 MG tablet 90 tablet 1     Sig: Take 1 tablet by mouth Daily.        Pharmacy where request should be sent: [address] [phone] [facility] [phone] [facility]     Additional details provided by patient: PLEASE REFILL.    Does the patient have less than a 3 day supply:  [x] Yes  [] No ONE LEFT    Lexi Garcia Rep   07/25/22 12:18 EDT

## 2022-07-25 NOTE — TELEPHONE ENCOUNTER
Rx Refill Note  Requested Prescriptions     Pending Prescriptions Disp Refills   • gabapentin (NEURONTIN) 600 MG tablet 90 tablet 2     Sig: Take 1 tablet by mouth 3 (Three) Times a Day.     Signed Prescriptions Disp Refills   • amLODIPine (NORVASC) 10 MG tablet 90 tablet 1     Sig: Take 1 tablet by mouth Daily.     Authorizing Provider: ALEX BERRIOS     Ordering User: ANGELINE PIERRE      Last office visit with prescribing clinician: 4/5/2022      Next office visit with prescribing clinician: 8/10/2022            Angeline Pierre MA  07/25/22, 12:24 EDT

## 2022-07-26 RX ORDER — GABAPENTIN 600 MG/1
600 TABLET ORAL 3 TIMES DAILY
Qty: 90 TABLET | Refills: 2 | Status: SHIPPED | OUTPATIENT
Start: 2022-07-26 | End: 2022-12-09 | Stop reason: SDUPTHER

## 2022-08-06 DIAGNOSIS — I10 ESSENTIAL HYPERTENSION: ICD-10-CM

## 2022-08-08 RX ORDER — CHLORTHALIDONE 25 MG/1
TABLET ORAL
Qty: 30 TABLET | Refills: 0 | Status: SHIPPED | OUTPATIENT
Start: 2022-08-08 | End: 2022-08-10 | Stop reason: SDUPTHER

## 2022-08-08 RX ORDER — LISINOPRIL 40 MG/1
TABLET ORAL
Qty: 30 TABLET | Refills: 0 | Status: SHIPPED | OUTPATIENT
Start: 2022-08-08 | End: 2022-08-10 | Stop reason: SDUPTHER

## 2022-08-10 ENCOUNTER — OFFICE VISIT (OUTPATIENT)
Dept: FAMILY MEDICINE CLINIC | Facility: CLINIC | Age: 52
End: 2022-08-10

## 2022-08-10 VITALS
WEIGHT: 315 LBS | OXYGEN SATURATION: 96 % | HEIGHT: 72 IN | HEART RATE: 91 BPM | DIASTOLIC BLOOD PRESSURE: 105 MMHG | TEMPERATURE: 97.3 F | SYSTOLIC BLOOD PRESSURE: 160 MMHG | BODY MASS INDEX: 42.66 KG/M2

## 2022-08-10 DIAGNOSIS — I10 ESSENTIAL HYPERTENSION: ICD-10-CM

## 2022-08-10 DIAGNOSIS — M54.42 CHRONIC BILATERAL LOW BACK PAIN WITH LEFT-SIDED SCIATICA: ICD-10-CM

## 2022-08-10 DIAGNOSIS — E66.01 CLASS 3 SEVERE OBESITY DUE TO EXCESS CALORIES WITH SERIOUS COMORBIDITY AND BODY MASS INDEX (BMI) OF 40.0 TO 44.9 IN ADULT: ICD-10-CM

## 2022-08-10 DIAGNOSIS — F33.0 MILD EPISODE OF RECURRENT MAJOR DEPRESSIVE DISORDER: ICD-10-CM

## 2022-08-10 DIAGNOSIS — G89.29 CHRONIC BILATERAL LOW BACK PAIN WITH LEFT-SIDED SCIATICA: ICD-10-CM

## 2022-08-10 DIAGNOSIS — R76.8 POSITIVE ANA (ANTINUCLEAR ANTIBODY): Primary | ICD-10-CM

## 2022-08-10 DIAGNOSIS — R70.0 ELEVATED SED RATE: ICD-10-CM

## 2022-08-10 DIAGNOSIS — G47.33 OSA (OBSTRUCTIVE SLEEP APNEA): ICD-10-CM

## 2022-08-10 PROCEDURE — 99214 OFFICE O/P EST MOD 30 MIN: CPT | Performed by: NURSE PRACTITIONER

## 2022-08-10 RX ORDER — LISINOPRIL 40 MG/1
40 TABLET ORAL DAILY
Qty: 90 TABLET | Refills: 3 | Status: SHIPPED | OUTPATIENT
Start: 2022-08-10 | End: 2023-03-28 | Stop reason: SDUPTHER

## 2022-08-10 RX ORDER — SEMAGLUTIDE 1.34 MG/ML
0.25 INJECTION, SOLUTION SUBCUTANEOUS WEEKLY
Qty: 1.5 ML | Refills: 0 | Status: SHIPPED | OUTPATIENT
Start: 2022-08-10 | End: 2022-08-31

## 2022-08-10 RX ORDER — CARVEDILOL 25 MG/1
25 TABLET ORAL 2 TIMES DAILY WITH MEALS
Qty: 180 TABLET | Refills: 3 | Status: SHIPPED | OUTPATIENT
Start: 2022-08-10 | End: 2023-03-28 | Stop reason: SDUPTHER

## 2022-08-10 RX ORDER — TERAZOSIN 2 MG/1
2 CAPSULE ORAL NIGHTLY
Qty: 30 CAPSULE | Refills: 2 | Status: SHIPPED | OUTPATIENT
Start: 2022-08-10 | End: 2022-08-31

## 2022-08-10 RX ORDER — CHLORTHALIDONE 25 MG/1
25 TABLET ORAL DAILY
Qty: 90 TABLET | Refills: 3 | Status: SHIPPED | OUTPATIENT
Start: 2022-08-10 | End: 2023-03-28 | Stop reason: SDUPTHER

## 2022-08-10 NOTE — PROGRESS NOTES
Subjective     Chief Complaint:    Chief Complaint   Patient presents with   • Hypertension       History of Present Illness:   HTN, on coreg, chlorthalidone, norvasc. Coreg was increased last visit, BP elevated, has gained weight.   MADELAINE, on CPAP  Elevated creatinine, was taking mobic and naproxen.  Has stopped  Has worsening left wrist pain, he is right handed, notes swelling, no injury  Has left knee pain that is chronic  Has bilateral shoulder pain, numbness in both 45h and 5th digits, has hand pain  Has lumbar disc issues, saw NS. Did not care for Dr. Van.   On gabapentin for pain and gives him some relief   Positive ALFREDO with elevated sed rate. Saw Dr. Edwards on Monday and has pending labs. Was given some type of medication but he is not sure what it was and does not have it at the pharmacy.     Review of Systems   Constitutional: Negative for fever and unexpected weight loss.   Cardiovascular: Negative for chest pain.   Gastrointestinal: Negative for abdominal pain.   Genitourinary: Negative for dysuria and urinary incontinence.   Musculoskeletal: Positive for back pain.   Neurological: Positive for weakness and numbness.     Gen- No fevers, chills  CV- No chest pain, palpitations  Resp- No cough, dyspnea  GI- No N/V/D, abd pain  Neuro-No dizziness, headaches      I have reviewed and/or updated the patient's past medical, surgical, family, social history and problem list as appropriate.     Medications:    Current Outpatient Medications:   •  amLODIPine (NORVASC) 10 MG tablet, Take 1 tablet by mouth Daily., Disp: 90 tablet, Rfl: 1  •  carvedilol (COREG) 25 MG tablet, Take 1 tablet by mouth 2 (Two) Times a Day With Meals., Disp: 180 tablet, Rfl: 3  •  chlorthalidone (HYGROTON) 25 MG tablet, Take 1 tablet by mouth Daily., Disp: 90 tablet, Rfl: 3  •  gabapentin (NEURONTIN) 600 MG tablet, Take 1 tablet by mouth 3 (Three) Times a Day., Disp: 90 tablet, Rfl: 2  •  lisinopril (PRINIVIL,ZESTRIL) 40 MG tablet,  "Take 1 tablet by mouth Daily., Disp: 90 tablet, Rfl: 3  •  sertraline (ZOLOFT) 50 MG tablet, Take 1 tablet by mouth Daily., Disp: 90 tablet, Rfl: 3  •  Semaglutide,0.25 or 0.5MG/DOS, (Ozempic, 0.25 or 0.5 MG/DOSE,) 2 MG/1.5ML solution pen-injector, Inject 0.25 mg under the skin into the appropriate area as directed 1 (One) Time Per Week., Disp: 1.5 mL, Rfl: 0  •  terazosin (HYTRIN) 2 MG capsule, Take 1 capsule by mouth Every Night., Disp: 30 capsule, Rfl: 2    Allergies:  No Known Allergies    Objective     Vital Signs:   Vitals:    08/10/22 1345   BP: (!) 160/105   Pulse: 91   Temp: 97.3 °F (36.3 °C)   SpO2: 96%   Weight: (!) 144 kg (316 lb 6.4 oz)   Height: 182.9 cm (72.01\")   PainSc: 10-Worst pain ever     Body mass index is 42.9 kg/m².    Physical Exam:    Physical Exam  Vitals and nursing note reviewed.   Constitutional:       Appearance: He is well-developed. He is obese.   HENT:      Head: Normocephalic and atraumatic.   Eyes:      Pupils: Pupils are equal, round, and reactive to light.   Cardiovascular:      Rate and Rhythm: Normal rate and regular rhythm.      Heart sounds: Normal heart sounds.   Pulmonary:      Effort: Pulmonary effort is normal.      Breath sounds: Normal breath sounds.   Abdominal:      General: Bowel sounds are normal. There is no distension.      Palpations: Abdomen is soft.      Tenderness: There is no abdominal tenderness.   Musculoskeletal:      Cervical back: Neck supple.   Skin:     General: Skin is warm and dry.   Neurological:      General: No focal deficit present.      Mental Status: He is alert and oriented to person, place, and time.   Psychiatric:         Mood and Affect: Mood normal.         Behavior: Behavior normal.         Assessment / Plan     Assessment/Plan:   Problem List Items Addressed This Visit        Cardiac and Vasculature    Essential hypertension    Relevant Medications    amLODIPine (NORVASC) 10 MG tablet    terazosin (HYTRIN) 2 MG capsule    carvedilol " (COREG) 25 MG tablet    chlorthalidone (HYGROTON) 25 MG tablet    lisinopril (PRINIVIL,ZESTRIL) 40 MG tablet       Endocrine and Metabolic    Class 3 severe obesity due to excess calories with serious comorbidity and body mass index (BMI) of 40.0 to 44.9 in adult (HCC)       Musculoskeletal and Injuries    Chronic bilateral low back pain with left-sided sciatica    Relevant Medications    gabapentin (NEURONTIN) 600 MG tablet      Other Visit Diagnoses     Positive ALFREDO (antinuclear antibody)    -  Primary    Mild episode of recurrent major depressive disorder (HCC)        Relevant Medications    sertraline (ZOLOFT) 50 MG tablet    Elevated sed rate        MADELAINE (obstructive sleep apnea)              -- BP not controlled, add terazosin, if this not help will need to see cards  -- ozempic sent for weight loss, hopefully will be approved  -- continue f/u with rheum  -- continue CPAP  -- continue osmany for pain    Follow up:  Return in about 3 weeks (around 8/31/2022).      Electronically signed by AMY Escamilla   04/05/2022 13:33 EDT      Please note that portions of this note may have been completed with a voice recognition program. Efforts were made to edit the dictations, but occasionally words are mistranscribed.  Answers for HPI/ROS submitted by the patient on 8/10/2022  What is the primary reason for your visit?: Back Pain  Chronicity: chronic  Onset: more than 1 year ago  Frequency: constantly  Progression since onset: rapidly worsening  Pain location: lumbar spine, sacro-iliac  Pain quality: aching, burning, shooting, stabbing  Radiates to: left foot, left knee, left thigh, right foot, right knee, right thigh  Pain - numeric: 10/10  Pain is: the same all the time  Aggravated by: bending, position, lying down, sitting, standing, stress, twisting  Stiffness is present: all day  bowel incontinence: Yes  headaches: No  leg pain: Yes  paresis: No  paresthesias: Yes  pelvic pain: No  perianal numbness:  No  tingling: Yes  Risk factors: lack of exercise, obesity, poor posture, sedentary lifestyle

## 2022-08-15 ENCOUNTER — PRIOR AUTHORIZATION (OUTPATIENT)
Dept: FAMILY MEDICINE CLINIC | Facility: CLINIC | Age: 52
End: 2022-08-15

## 2022-08-15 NOTE — TELEPHONE ENCOUNTER
A PRIOR AUTH HAS BEEN STARTED THROUGH COVER MY MEDS FOR OZEMPIC.    WAITING ON A RESPONSE FROM THE INSURANCE.    Key: [id number]

## 2022-08-15 NOTE — TELEPHONE ENCOUNTER
PATIENTS MEDICATION HAS BEEN DENIED BY THE INSURANCE.    PER THE INSURANCE: PATIENT DOES NOT HAVE A DX OF TYPE 2 DIABETES MELLITUS.

## 2022-08-31 ENCOUNTER — OFFICE VISIT (OUTPATIENT)
Dept: FAMILY MEDICINE CLINIC | Facility: CLINIC | Age: 52
End: 2022-08-31

## 2022-08-31 VITALS
DIASTOLIC BLOOD PRESSURE: 100 MMHG | SYSTOLIC BLOOD PRESSURE: 160 MMHG | TEMPERATURE: 97.3 F | OXYGEN SATURATION: 94 % | BODY MASS INDEX: 42.66 KG/M2 | WEIGHT: 315 LBS | HEART RATE: 63 BPM | HEIGHT: 72 IN

## 2022-08-31 DIAGNOSIS — G89.29 CHRONIC BILATERAL LOW BACK PAIN WITH LEFT-SIDED SCIATICA: ICD-10-CM

## 2022-08-31 DIAGNOSIS — R00.2 PALPITATIONS: ICD-10-CM

## 2022-08-31 DIAGNOSIS — R76.8 POSITIVE ANA (ANTINUCLEAR ANTIBODY): ICD-10-CM

## 2022-08-31 DIAGNOSIS — I10 ESSENTIAL HYPERTENSION: ICD-10-CM

## 2022-08-31 DIAGNOSIS — Z12.11 SCREEN FOR COLON CANCER: ICD-10-CM

## 2022-08-31 DIAGNOSIS — G47.33 OSA (OBSTRUCTIVE SLEEP APNEA): ICD-10-CM

## 2022-08-31 DIAGNOSIS — M54.42 CHRONIC BILATERAL LOW BACK PAIN WITH LEFT-SIDED SCIATICA: ICD-10-CM

## 2022-08-31 DIAGNOSIS — I10 RESISTANT HYPERTENSION: Primary | ICD-10-CM

## 2022-08-31 PROCEDURE — 99214 OFFICE O/P EST MOD 30 MIN: CPT | Performed by: NURSE PRACTITIONER

## 2022-08-31 RX ORDER — TERAZOSIN 2 MG/1
4 CAPSULE ORAL NIGHTLY
Qty: 60 CAPSULE | Refills: 2 | Status: SHIPPED | OUTPATIENT
Start: 2022-08-31 | End: 2022-11-09

## 2022-08-31 RX ORDER — TERAZOSIN 2 MG/1
4 CAPSULE ORAL NIGHTLY
Qty: 60 CAPSULE | Refills: 2 | Status: SHIPPED | OUTPATIENT
Start: 2022-08-31 | End: 2022-08-31

## 2022-08-31 NOTE — PROGRESS NOTES
"      Subjective     Chief Complaint:    Chief Complaint   Patient presents with   • Hypertension   • Obesity       History of Present Illness:   F/u HTN, on norvasc, coreg, chlorthalidone, terazosin, lisinopril.   He continues to gain weight.   Drinks 3-4 drinks of vodka per night, no longer using sugary mixer. He eats 1 meal per day. Has a snack before bedtime.   MADELAINE on CPAP  He continues to follow with arthritis doctor for joint pain and abnormal ALFREDO  Continued back pain, has seen NS    Review of Systems  Gen- No fevers, chills  CV- No chest pain, palpitations  Resp- No cough, dyspnea  GI- No N/V/D, abd pain  Neuro-No dizziness, headaches      I have reviewed and/or updated the patient's past medical, surgical, family, social history and problem list as appropriate.     Medications:    Current Outpatient Medications:   •  amLODIPine (NORVASC) 10 MG tablet, Take 1 tablet by mouth Daily., Disp: 90 tablet, Rfl: 1  •  carvedilol (COREG) 25 MG tablet, Take 1 tablet by mouth 2 (Two) Times a Day With Meals., Disp: 180 tablet, Rfl: 3  •  chlorthalidone (HYGROTON) 25 MG tablet, Take 1 tablet by mouth Daily., Disp: 90 tablet, Rfl: 3  •  gabapentin (NEURONTIN) 600 MG tablet, Take 1 tablet by mouth 3 (Three) Times a Day., Disp: 90 tablet, Rfl: 2  •  lisinopril (PRINIVIL,ZESTRIL) 40 MG tablet, Take 1 tablet by mouth Daily., Disp: 90 tablet, Rfl: 3  •  sertraline (ZOLOFT) 50 MG tablet, Take 1 tablet by mouth Daily., Disp: 90 tablet, Rfl: 3  •  terazosin (HYTRIN) 2 MG capsule, Take 2 capsules by mouth Every Night., Disp: 60 capsule, Rfl: 2    Allergies:  No Known Allergies    Objective     Vital Signs:   Vitals:    08/31/22 1321   BP: 160/100   Pulse: 63   Temp: 97.3 °F (36.3 °C)   SpO2: 94%   Weight: (!) 145 kg (320 lb 3.2 oz)   Height: 182.9 cm (72.01\")   PainSc: 10-Worst pain ever     Body mass index is 43.42 kg/m².    Physical Exam:    Physical Exam  Vitals and nursing note reviewed.   Constitutional:       Appearance: He " is well-developed. He is obese.   HENT:      Head: Normocephalic and atraumatic.   Eyes:      Pupils: Pupils are equal, round, and reactive to light.   Cardiovascular:      Rate and Rhythm: Normal rate and regular rhythm.      Heart sounds: Normal heart sounds.   Pulmonary:      Effort: Pulmonary effort is normal.      Breath sounds: Normal breath sounds.   Abdominal:      General: Bowel sounds are normal. There is no distension.      Palpations: Abdomen is soft.      Tenderness: There is no abdominal tenderness.   Musculoskeletal:      Cervical back: Neck supple.   Skin:     General: Skin is warm and dry.      Capillary Refill: Capillary refill takes less than 2 seconds.   Neurological:      General: No focal deficit present.      Mental Status: He is alert and oriented to person, place, and time.   Psychiatric:         Mood and Affect: Mood normal.         Behavior: Behavior normal.         Assessment / Plan     Assessment/Plan:   Problem List Items Addressed This Visit        Cardiac and Vasculature    Essential hypertension    Relevant Medications    amLODIPine (NORVASC) 10 MG tablet    carvedilol (COREG) 25 MG tablet    chlorthalidone (HYGROTON) 25 MG tablet    lisinopril (PRINIVIL,ZESTRIL) 40 MG tablet    terazosin (HYTRIN) 2 MG capsule       Musculoskeletal and Injuries    Chronic bilateral low back pain with left-sided sciatica    Relevant Medications    gabapentin (NEURONTIN) 600 MG tablet      Other Visit Diagnoses     Resistant hypertension    -  Primary    Relevant Medications    terazosin (HYTRIN) 2 MG capsule    Other Relevant Orders    Ambulatory Referral to Cardiology    Adult Transthoracic Echo Complete W/ Cont if Necessary Per Protocol    Palpitations        Relevant Orders    Adult Transthoracic Echo Complete W/ Cont if Necessary Per Protocol    MADELAINE (obstructive sleep apnea)        Positive ALFREDO (antinuclear antibody)        Screen for colon cancer        Relevant Orders    Cologuard - Stool, Per  Rectum        -- BP still not controlled in setting of 5 meds, increase terazosin to 4mg night, will order echo and get him in with cards  -- continue CPAP  -- continue gabapentin for back pain    Follow up:  6 weeks    Electronically signed by AMY Escamilla   08/31/2022 13:45 EDT      Please note that portions of this note may have been completed with a voice recognition program. Efforts were made to edit the dictations, but occasionally words are mistranscribed.  Answers for HPI/ROS submitted by the patient on 8/31/2022  What is the primary reason for your visit?: Back Pain  Chronicity: chronic  Onset: more than 1 year ago  Frequency: constantly  Progression since onset: gradually worsening  Pain location: lumbar spine, sacro-iliac  Pain quality: aching, burning, cramping, shooting, stabbing  Radiates to: left foot, left knee, right foot, right knee  Pain - numeric: 10/10  Pain is: the same all the time  Aggravated by: bending, coughing, position, lying down, sitting, standing, stress, twisting  Stiffness is present: all day  bowel incontinence: No  headaches: No  leg pain: Yes  paresis: No  paresthesias: Yes  pelvic pain: Yes  perianal numbness: No  tingling: Yes  Risk factors: lack of exercise, obesity, poor posture, sedentary lifestyle

## 2022-09-07 ENCOUNTER — HOSPITAL ENCOUNTER (OUTPATIENT)
Dept: CARDIOLOGY | Facility: HOSPITAL | Age: 52
Discharge: HOME | End: 2022-09-07
Admitting: NURSE PRACTITIONER

## 2022-09-07 VITALS — WEIGHT: 315 LBS | HEIGHT: 72 IN | BODY MASS INDEX: 42.66 KG/M2

## 2022-09-07 DIAGNOSIS — R00.2 PALPITATIONS: ICD-10-CM

## 2022-09-07 DIAGNOSIS — I10 RESISTANT HYPERTENSION: ICD-10-CM

## 2022-09-07 LAB
BH CV ECHO MEAS - AO MAX PG: 11.7 MMHG
BH CV ECHO MEAS - AO MEAN PG: 7 MMHG
BH CV ECHO MEAS - AO ROOT DIAM: 3 CM
BH CV ECHO MEAS - AO V2 MAX: 171 CM/SEC
BH CV ECHO MEAS - AO V2 VTI: 32.3 CM
BH CV ECHO MEAS - AVA(I,D): 3.5 CM2
BH CV ECHO MEAS - EDV(CUBED): 143.9 ML
BH CV ECHO MEAS - EDV(MOD-SP2): 158 ML
BH CV ECHO MEAS - EDV(MOD-SP4): 94.8 ML
BH CV ECHO MEAS - EF(MOD-BP): 67.8 %
BH CV ECHO MEAS - EF(MOD-SP2): 69.9 %
BH CV ECHO MEAS - EF(MOD-SP4): 64 %
BH CV ECHO MEAS - ESV(CUBED): 56.6 ML
BH CV ECHO MEAS - ESV(MOD-SP2): 47.5 ML
BH CV ECHO MEAS - ESV(MOD-SP4): 34.1 ML
BH CV ECHO MEAS - FS: 26.7 %
BH CV ECHO MEAS - IVS/LVPW: 1.26 CM
BH CV ECHO MEAS - IVSD: 1.7 CM
BH CV ECHO MEAS - LA DIMENSION: 3.8 CM
BH CV ECHO MEAS - LAT PEAK E' VEL: 10.4 CM/SEC
BH CV ECHO MEAS - LV MASS(C)D: 354.9 GRAMS
BH CV ECHO MEAS - LV MAX PG: 8.2 MMHG
BH CV ECHO MEAS - LV MEAN PG: 5 MMHG
BH CV ECHO MEAS - LV V1 MAX: 143 CM/SEC
BH CV ECHO MEAS - LV V1 VTI: 30.6 CM
BH CV ECHO MEAS - LVIDD: 5.2 CM
BH CV ECHO MEAS - LVIDS: 3.8 CM
BH CV ECHO MEAS - LVOT AREA: 3.7 CM2
BH CV ECHO MEAS - LVOT DIAM: 2.18 CM
BH CV ECHO MEAS - LVPWD: 1.35 CM
BH CV ECHO MEAS - MED PEAK E' VEL: 10.3 CM/SEC
BH CV ECHO MEAS - MV A MAX VEL: 68.6 CM/SEC
BH CV ECHO MEAS - MV DEC SLOPE: 483 CM/SEC2
BH CV ECHO MEAS - MV DEC TIME: 0.16 MSEC
BH CV ECHO MEAS - MV E MAX VEL: 106 CM/SEC
BH CV ECHO MEAS - MV E/A: 1.55
BH CV ECHO MEAS - MV MAX PG: 5.7 MMHG
BH CV ECHO MEAS - MV MEAN PG: 2 MMHG
BH CV ECHO MEAS - MV P1/2T: 78.2 MSEC
BH CV ECHO MEAS - MV V2 VTI: 38.6 CM
BH CV ECHO MEAS - MVA(P1/2T): 2.8 CM2
BH CV ECHO MEAS - MVA(VTI): 3 CM2
BH CV ECHO MEAS - PA ACC TIME: 0.16 SEC
BH CV ECHO MEAS - PA PR(ACCEL): 9.3 MMHG
BH CV ECHO MEAS - PA V2 MAX: 90.4 CM/SEC
BH CV ECHO MEAS - RAP SYSTOLE: 3 MMHG
BH CV ECHO MEAS - RV MAX PG: 2.32 MMHG
BH CV ECHO MEAS - RV V1 MAX: 76.1 CM/SEC
BH CV ECHO MEAS - RV V1 VTI: 13.4 CM
BH CV ECHO MEAS - SV(LVOT): 114.2 ML
BH CV ECHO MEAS - SV(MOD-SP2): 110.5 ML
BH CV ECHO MEAS - SV(MOD-SP4): 60.7 ML
BH CV ECHO MEASUREMENTS AVERAGE E/E' RATIO: 10.24
BH CV XLRA - TDI S': 13.1 CM/SEC
LEFT ATRIUM VOLUME INDEX: 17.8 ML/M2
MAXIMAL PREDICTED HEART RATE: 170 BPM
STRESS TARGET HR: 145 BPM

## 2022-09-07 PROCEDURE — 93306 TTE W/DOPPLER COMPLETE: CPT

## 2022-09-07 PROCEDURE — 93306 TTE W/DOPPLER COMPLETE: CPT | Performed by: INTERNAL MEDICINE

## 2022-09-15 NOTE — PROGRESS NOTES
Overall ECHO looks good. He does have some very mild thickening of his left ventricle which we see with long standing hypertension

## 2022-09-26 DIAGNOSIS — I10 ESSENTIAL HYPERTENSION: ICD-10-CM

## 2022-09-26 RX ORDER — AMLODIPINE BESYLATE 10 MG/1
10 TABLET ORAL DAILY
Qty: 90 TABLET | Refills: 1 | Status: SHIPPED | OUTPATIENT
Start: 2022-09-26 | End: 2023-03-28 | Stop reason: SDUPTHER

## 2022-10-05 DIAGNOSIS — Z12.11 SCREEN FOR COLON CANCER: Primary | ICD-10-CM

## 2022-11-08 ENCOUNTER — OFFICE VISIT (OUTPATIENT)
Dept: CARDIOLOGY | Facility: CLINIC | Age: 52
End: 2022-11-08

## 2022-11-08 VITALS
OXYGEN SATURATION: 98 % | DIASTOLIC BLOOD PRESSURE: 86 MMHG | HEIGHT: 72 IN | HEART RATE: 76 BPM | SYSTOLIC BLOOD PRESSURE: 128 MMHG | RESPIRATION RATE: 18 BRPM | BODY MASS INDEX: 41.99 KG/M2 | WEIGHT: 310 LBS

## 2022-11-08 DIAGNOSIS — I10 PRIMARY HYPERTENSION: Primary | ICD-10-CM

## 2022-11-08 DIAGNOSIS — E66.01 MORBID OBESITY WITH BMI OF 40.0-44.9, ADULT: ICD-10-CM

## 2022-11-08 PROCEDURE — 93000 ELECTROCARDIOGRAM COMPLETE: CPT | Performed by: INTERNAL MEDICINE

## 2022-11-08 PROCEDURE — 99203 OFFICE O/P NEW LOW 30 MIN: CPT | Performed by: INTERNAL MEDICINE

## 2022-11-08 RX ORDER — HYDROXYCHLOROQUINE SULFATE 200 MG/1
TABLET, FILM COATED ORAL DAILY
COMMUNITY
Start: 2022-11-07

## 2022-11-08 NOTE — PROGRESS NOTES
[facility] Cardiology OP Consult Note    Dharmesh Samuel  [id number]  2022    Referred By: Michelle Sky APRN    Chief Complaint: Hypertension    History of Present Illness:   Mr. Dharmesh Samuel is a 51 y.o. male who presents to the Cardiology Clinic for evaluation of hypertension.  The patient has a past medical history significant for hypertension, as well as obesity with a BMI 42.  He does not have any significant past cardiac history.  He presents today primarily for evaluation of difficult to control hypertension.  The patient reports a long history of hypertension, which has previously been uncontrolled.  On review of records, in  his systolic pressure was in the 160s.  He reports he has undergone several medication changes by his primary physician to try to control his blood pressure.  Today, his systolic BP is in the 120s, and he reports this is the lowest pressure he has seen in a while.  He reports he is tolerating his current antihypertensive medications without difficulty.  He does report significant back and lower extremity pain as well as stress, which he believes is contributing to his hypertension.  He denies any significant exertional chest pain or chest discomfort.  No significant exertional dyspnea.  No other specific complaints today.    Past Cardiac Testin. Last Coronary Angio: None  2. Prior Stress Testing: None  3. Last Echo:    1.  Normal left ventricular size and systolic function, LVEF 60-65%.   2.  Mild concentric LVH.   3.  Normal LV diastolic filling pattern.   4.  Normal right ventricular size and systolic function.   5.  Normal left and right atrial size.   6.  No significant valvular abnormalities.  4. Prior Holter Monitor: None    Review of Systems:   Review of Systems   Constitutional: Negative for activity change, appetite change, chills, diaphoresis, fatigue, fever, unexpected weight gain and unexpected weight loss.   Eyes: Negative for blurred  vision and double vision.   Respiratory: Negative for cough, chest tightness, shortness of breath and wheezing.    Cardiovascular: Negative for chest pain, palpitations and leg swelling.   Gastrointestinal: Negative for abdominal pain, anal bleeding, blood in stool and GERD.   Endocrine: Negative for cold intolerance and heat intolerance.   Genitourinary: Negative for hematuria.   Musculoskeletal: Positive for arthralgias and back pain.   Neurological: Negative for dizziness, syncope, weakness and light-headedness.   Hematological: Does not bruise/bleed easily.   Psychiatric/Behavioral: Negative for depressed mood and stress. The patient is not nervous/anxious.        Past Medical History:   Past Medical History:   Diagnosis Date   • Arthritis    • Bulging lumbar disc    • Depression 2017   • Hypertension    • Low back pain    • Obesity 2020   • Pinched nerve    • Sleep apnea    • Visual impairment 2020       Past Surgical History: No past surgical history on file.    Family History:   Family History   Problem Relation Age of Onset   • Hypertension Father    • Sleep apnea Paternal Uncle    • Alcohol abuse Maternal Grandfather    • Arthritis Maternal Grandmother    • Arthritis Paternal Grandmother        Social History:   Social History     Socioeconomic History   • Marital status:    Tobacco Use   • Smoking status: Every Day     Packs/day: 0.50     Years: 15.00     Pack years: 7.50     Types: Cigarettes   • Smokeless tobacco: Former   Vaping Use   • Vaping Use: Never used   Substance and Sexual Activity   • Alcohol use: Yes     Alcohol/week: 5.0 standard drinks     Types: 5 Drinks containing 0.5 oz of alcohol per week     Comment: 4 drinks a day   • Drug use: Never   • Sexual activity: Yes     Partners: Female     Birth control/protection: None       Medications:     Current Outpatient Medications:   •  amLODIPine (NORVASC) 10 MG tablet, Take 1 tablet by mouth Daily., Disp: 90 tablet, Rfl: 1  •  carvedilol  "(COREG) 25 MG tablet, Take 1 tablet by mouth 2 (Two) Times a Day With Meals., Disp: 180 tablet, Rfl: 3  •  chlorthalidone (HYGROTON) 25 MG tablet, Take 1 tablet by mouth Daily., Disp: 90 tablet, Rfl: 3  •  gabapentin (NEURONTIN) 600 MG tablet, Take 1 tablet by mouth 3 (Three) Times a Day. (Patient taking differently: Take 1 tablet by mouth 2 (Two) Times a Day As Needed.), Disp: 90 tablet, Rfl: 2  •  hydroxychloroquine (PLAQUENIL) 200 MG tablet, Daily., Disp: , Rfl:   •  lisinopril (PRINIVIL,ZESTRIL) 40 MG tablet, Take 1 tablet by mouth Daily., Disp: 90 tablet, Rfl: 3  •  sertraline (ZOLOFT) 50 MG tablet, Take 1 tablet by mouth Daily., Disp: 90 tablet, Rfl: 3  •  terazosin (HYTRIN) 2 MG capsule, Take 2 capsules by mouth Every Night., Disp: 60 capsule, Rfl: 2    Allergies:   No Known Allergies    Physical Exam:  Vital Signs:   Vitals:    11/08/22 1027 11/08/22 1031   BP: 128/82 128/86   BP Location: Left arm Right arm   Patient Position: Sitting Sitting   Pulse: 76    Resp: 18    SpO2: 98%    Weight: (!) 141 kg (310 lb)    Height: 182.9 cm (72\")        Physical Exam  Constitutional:       General: He is not in acute distress.     Appearance: He is obese. He is not diaphoretic.   HENT:      Head: Normocephalic and atraumatic.   Cardiovascular:      Rate and Rhythm: Normal rate and regular rhythm.      Heart sounds: No murmur heard.  Pulmonary:      Effort: Pulmonary effort is normal. No respiratory distress.      Breath sounds: Normal breath sounds. No stridor. No wheezing, rhonchi or rales.   Abdominal:      General: Bowel sounds are normal. There is no distension.      Palpations: Abdomen is soft.      Tenderness: There is no abdominal tenderness. There is no guarding or rebound.   Musculoskeletal:         General: No swelling. Normal range of motion.      Cervical back: Neck supple. No tenderness.   Skin:     General: Skin is warm and dry.   Neurological:      General: No focal deficit present.      Mental Status: " He is alert and oriented to person, place, and time.   Psychiatric:         Mood and Affect: Mood normal.         Behavior: Behavior normal.         Results Review:   I reviewed the patient's new clinical results.  I personally viewed and interpreted the patient's EKG/Telemetry data      ECG 12 Lead    Date/Time: 11/8/2022 11:06 AM  Performed by: Wilbert Mcgregor MD  Authorized by: Wilbert Mcgregor MD   Comparison: not compared with previous ECG   Rhythm: sinus rhythm  Rate: normal  QRS axis: normal    Clinical impression: normal ECG            Assessment / Plan:     1. Primary hypertension  -- Long history of difficulty control hypertension  --Suspect hypertension likely multifactorial secondary to a genetic component with family history, obesity, chronic pain, stress/anxiety, etc.  --Systolic BP 120s today following last medication changes  --Advised the patient to keep a home blood pressure log over the next month and bring his log to his next appointment for evaluation  --If blood pressure remains uncontrolled on home BP log, will then evaluate for secondary causes of hypertension  --Follow-up in 1 month    2. Morbid obesity with BMI of 40.0-44.9  -- Weight loss advised as obesity likely contributes hypertension      Follow Up:   Return in about 4 weeks (around 12/6/2022).      Thank you for allowing me to participate in the care of your patient. Please to not hesitate to contact me with additional questions or concerns.     NEFTALI Mcgregor MD  Interventional Cardiology   11/08/2022  10:31 EST

## 2022-11-09 ENCOUNTER — PATIENT MESSAGE (OUTPATIENT)
Dept: CARDIOLOGY | Facility: CLINIC | Age: 52
End: 2022-11-09

## 2022-11-09 ENCOUNTER — PATIENT ROUNDING (BHMG ONLY) (OUTPATIENT)
Dept: CARDIOLOGY | Facility: CLINIC | Age: 52
End: 2022-11-09

## 2022-11-09 ENCOUNTER — OFFICE VISIT (OUTPATIENT)
Dept: FAMILY MEDICINE CLINIC | Facility: CLINIC | Age: 52
End: 2022-11-09

## 2022-11-09 VITALS
TEMPERATURE: 97.6 F | HEIGHT: 72 IN | SYSTOLIC BLOOD PRESSURE: 156 MMHG | BODY MASS INDEX: 42.12 KG/M2 | HEART RATE: 67 BPM | WEIGHT: 311 LBS | DIASTOLIC BLOOD PRESSURE: 100 MMHG | OXYGEN SATURATION: 97 %

## 2022-11-09 DIAGNOSIS — E66.01 CLASS 3 SEVERE OBESITY DUE TO EXCESS CALORIES WITH SERIOUS COMORBIDITY AND BODY MASS INDEX (BMI) OF 40.0 TO 44.9 IN ADULT: ICD-10-CM

## 2022-11-09 DIAGNOSIS — M54.42 CHRONIC BILATERAL LOW BACK PAIN WITH LEFT-SIDED SCIATICA: ICD-10-CM

## 2022-11-09 DIAGNOSIS — M32.9 LUPUS: ICD-10-CM

## 2022-11-09 DIAGNOSIS — I10 ESSENTIAL HYPERTENSION: Primary | ICD-10-CM

## 2022-11-09 DIAGNOSIS — G89.29 CHRONIC PAIN OF LEFT KNEE: ICD-10-CM

## 2022-11-09 DIAGNOSIS — G89.29 CHRONIC BILATERAL LOW BACK PAIN WITH LEFT-SIDED SCIATICA: ICD-10-CM

## 2022-11-09 DIAGNOSIS — M25.562 CHRONIC PAIN OF LEFT KNEE: ICD-10-CM

## 2022-11-09 PROBLEM — IMO0002 LUPUS: Status: ACTIVE | Noted: 2022-11-09

## 2022-11-09 PROCEDURE — 99214 OFFICE O/P EST MOD 30 MIN: CPT | Performed by: NURSE PRACTITIONER

## 2022-11-09 RX ORDER — TERAZOSIN 5 MG/1
5 CAPSULE ORAL NIGHTLY
Qty: 30 CAPSULE | Refills: 1 | Status: SHIPPED | OUTPATIENT
Start: 2022-11-09 | End: 2023-03-28 | Stop reason: SDUPTHER

## 2022-11-09 RX ORDER — HYDROCODONE BITARTRATE AND ACETAMINOPHEN 7.5; 325 MG/1; MG/1
1 TABLET ORAL DAILY PRN
Qty: 20 TABLET | Refills: 0 | Status: SHIPPED | OUTPATIENT
Start: 2022-11-09 | End: 2023-03-28

## 2022-11-09 NOTE — PROGRESS NOTES
Subjective     Chief Complaint:    Chief Complaint   Patient presents with   • Hypertension       History of Present Illness:   Recently diagnosed with lupus and OA. He is on hydroxychlorquine. Saw Dr. Edwards (no note available to review). Does not have f/u visit scheduled.   He has not seen improvement in joint pain.   Refractory HTN, on coreg, norvasc, chlorthalidone, lisinopril, and terazosin. Saw. Dr. Mcgregor yesterday. BP was normal but I really feel this was inaccurate as his BP has never been that low in the past year.   Mood disorder on zoloft  Chronic back pain, on osmany. Still has pain. Has taken steroids in the past without much improvement. Has seen NS but was not pleased with Dr. Dexter.       Review of Systems  Gen- No fevers, chills  CV- No chest pain, palpitations  Resp- No cough, dyspnea  GI- No N/V/D, abd pain  Neuro-No dizziness, headaches      I have reviewed and/or updated the patient's past medical, surgical, family, social history and problem list as appropriate.     Medications:    Current Outpatient Medications:   •  amLODIPine (NORVASC) 10 MG tablet, Take 1 tablet by mouth Daily., Disp: 90 tablet, Rfl: 1  •  carvedilol (COREG) 25 MG tablet, Take 1 tablet by mouth 2 (Two) Times a Day With Meals., Disp: 180 tablet, Rfl: 3  •  chlorthalidone (HYGROTON) 25 MG tablet, Take 1 tablet by mouth Daily., Disp: 90 tablet, Rfl: 3  •  gabapentin (NEURONTIN) 600 MG tablet, Take 1 tablet by mouth 3 (Three) Times a Day. (Patient taking differently: Take 1 tablet by mouth 2 (Two) Times a Day As Needed.), Disp: 90 tablet, Rfl: 2  •  hydroxychloroquine (PLAQUENIL) 200 MG tablet, Daily., Disp: , Rfl:   •  lisinopril (PRINIVIL,ZESTRIL) 40 MG tablet, Take 1 tablet by mouth Daily., Disp: 90 tablet, Rfl: 3  •  sertraline (ZOLOFT) 50 MG tablet, Take 1 tablet by mouth Daily., Disp: 90 tablet, Rfl: 3  •  HYDROcodone-acetaminophen (Norco) 7.5-325 MG per tablet, Take 1 tablet by mouth Daily As Needed for Moderate  "Pain. Must last 30 days, Disp: 20 tablet, Rfl: 0  •  terazosin (HYTRIN) 5 MG capsule, Take 1 capsule by mouth Every Night., Disp: 30 capsule, Rfl: 1    Allergies:  No Known Allergies    Objective     Vital Signs:   Vitals:    11/09/22 1324 11/09/22 1329   BP: 152/90 156/100   BP Location: Left arm Right arm   Pulse: 67    Temp: 97.6 °F (36.4 °C)    SpO2: 97%    Weight: (!) 141 kg (311 lb)    Height: 182.9 cm (72.01\")    PainSc:   8      Body mass index is 42.17 kg/m².    Physical Exam:    Physical Exam  Vitals and nursing note reviewed.   Constitutional:       Appearance: He is well-developed.   HENT:      Head: Normocephalic and atraumatic.   Eyes:      Pupils: Pupils are equal, round, and reactive to light.   Cardiovascular:      Rate and Rhythm: Normal rate and regular rhythm.      Heart sounds: Normal heart sounds.   Pulmonary:      Effort: Pulmonary effort is normal.      Breath sounds: Normal breath sounds.   Abdominal:      General: Bowel sounds are normal. There is no distension.      Palpations: Abdomen is soft.      Tenderness: There is no abdominal tenderness.   Musculoskeletal:      Cervical back: Neck supple.   Skin:     General: Skin is warm and dry.   Neurological:      Mental Status: He is alert and oriented to person, place, and time.   Psychiatric:         Behavior: Behavior normal.         Assessment / Plan     Assessment/Plan:   Problem List Items Addressed This Visit        Cardiac and Vasculature    Essential hypertension - Primary    Relevant Medications    carvedilol (COREG) 25 MG tablet    chlorthalidone (HYGROTON) 25 MG tablet    lisinopril (PRINIVIL,ZESTRIL) 40 MG tablet    amLODIPine (NORVASC) 10 MG tablet    terazosin (HYTRIN) 5 MG capsule       Endocrine and Metabolic    Class 3 severe obesity due to excess calories with serious comorbidity and body mass index (BMI) of 40.0 to 44.9 in adult (HCC)       Multi-system (Lupus, Sarcoid...)    Lupus (HCC)       Musculoskeletal and Injuries    " Chronic pain of left knee    Relevant Medications    HYDROcodone-acetaminophen (Norco) 7.5-325 MG per tablet    Chronic bilateral low back pain with left-sided sciatica    Relevant Medications    gabapentin (NEURONTIN) 600 MG tablet    HYDROcodone-acetaminophen (Norco) 7.5-325 MG per tablet     -- BP still not controlled, will reach back out to cards, increase terazosin to 5mg. RTC in 1 week for nurse visit BP check and can uptitrate to 10.   -- keep f/u with rheum  -- will give him short course of norco for pain, discussed #20 must last 1 month. Not a good candidate for NSAIDs due to CKD and uncontrolled HTN    Follow up:  3 months    Electronically signed by AMY Escamilla   11/09/2022 13:44 EST      Please note that portions of this note were completed with a voice recognition program.

## 2022-11-09 NOTE — PROGRESS NOTES
[facility] Cardiology welcome email and rounding message has been sent to patient via [employer].

## 2022-11-11 RX ORDER — PREDNISONE 10 MG/1
TABLET ORAL
Qty: 10 TABLET | Refills: 0 | Status: SHIPPED | OUTPATIENT
Start: 2022-11-11 | End: 2023-02-10

## 2022-11-29 ENCOUNTER — TELEPHONE (OUTPATIENT)
Dept: FAMILY MEDICINE CLINIC | Facility: CLINIC | Age: 52
End: 2022-11-29

## 2022-11-29 NOTE — TELEPHONE ENCOUNTER
Dr. Edwards office called needing to speak with Michelle about patient. He is needing to speak with Michelle directly.

## 2022-12-08 ENCOUNTER — OFFICE VISIT (OUTPATIENT)
Dept: CARDIOLOGY | Facility: CLINIC | Age: 52
End: 2022-12-08

## 2022-12-08 VITALS
HEART RATE: 76 BPM | HEIGHT: 72 IN | DIASTOLIC BLOOD PRESSURE: 70 MMHG | SYSTOLIC BLOOD PRESSURE: 114 MMHG | OXYGEN SATURATION: 98 % | RESPIRATION RATE: 18 BRPM | BODY MASS INDEX: 42.26 KG/M2 | WEIGHT: 312 LBS

## 2022-12-08 DIAGNOSIS — E66.01 CLASS 3 SEVERE OBESITY DUE TO EXCESS CALORIES WITH SERIOUS COMORBIDITY AND BODY MASS INDEX (BMI) OF 40.0 TO 44.9 IN ADULT: ICD-10-CM

## 2022-12-08 DIAGNOSIS — I10 ESSENTIAL HYPERTENSION: Primary | ICD-10-CM

## 2022-12-08 DIAGNOSIS — N18.2 CKD (CHRONIC KIDNEY DISEASE) STAGE 2, GFR 60-89 ML/MIN: ICD-10-CM

## 2022-12-08 PROCEDURE — 99213 OFFICE O/P EST LOW 20 MIN: CPT | Performed by: INTERNAL MEDICINE

## 2022-12-08 RX ORDER — FOLIC ACID 1 MG/1
1000 TABLET ORAL DAILY
COMMUNITY
Start: 2022-11-18 | End: 2023-02-27 | Stop reason: SDUPTHER

## 2022-12-08 RX ORDER — METHOTREXATE 2.5 MG/1
15 TABLET ORAL WEEKLY
COMMUNITY
Start: 2022-11-30

## 2022-12-08 NOTE — PROGRESS NOTES
[facility] Cardiology Office Follow Up Note    Dharmesh Samuel  [id number]  2022    Primary Care Provider: Michelle Sky APRN    Chief Complaint: Follow-up for hypertension    History of Present Illness:   Mr. Dharmesh Samuel is a 52 y.o. male who presents to the Cardiology Clinic for follow-up of hypertension.  The patient has a past medical history significant for hypertension, as well as obesity with a BMI 42.  He does not have any significant past cardiac history.    Since his last appointment, the patient had up titration of his terazosin due to uncontrolled systolic BP.  Since that time, he has not been routinely checking his blood pressure at home.  He does report compliance with his antihypertensive medications, and reports he has been feeling well until recently developing an upper respiratory viral illness.  He denies any dizziness or lightheadedness.  No orthostatic symptoms.  He remains active without chest pain or exertional angina.  No other specific complaints today.    Past Cardiac Testin. Last Coronary Angio: None  2. Prior Stress Testing: None  3. Last Echo:               1.  Normal left ventricular size and systolic function, LVEF 60-65%.              2.  Mild concentric LVH.              3.  Normal LV diastolic filling pattern.              4.  Normal right ventricular size and systolic function.              5.  Normal left and right atrial size.              6.  No significant valvular abnormalities.  4. Prior Holter Monitor: None    Review of Systems:   Review of Systems   Constitutional: Negative for activity change, appetite change, chills, diaphoresis, fatigue, fever, unexpected weight gain and unexpected weight loss.   Eyes: Negative for blurred vision and double vision.   Respiratory: Negative for cough, chest tightness, shortness of breath and wheezing.    Cardiovascular: Negative for chest pain, palpitations and leg swelling.   Gastrointestinal:  Negative for abdominal pain, anal bleeding, blood in stool and GERD.   Endocrine: Negative for cold intolerance and heat intolerance.   Genitourinary: Negative for hematuria.   Neurological: Negative for dizziness, syncope, weakness and light-headedness.   Hematological: Does not bruise/bleed easily.   Psychiatric/Behavioral: Negative for depressed mood and stress. The patient is not nervous/anxious.        I have reviewed and/or updated the patient's past medical, past surgical, family, social history, problem list and allergies as appropriate.     Medications:     Current Outpatient Medications:   •  amLODIPine (NORVASC) 10 MG tablet, Take 1 tablet by mouth Daily., Disp: 90 tablet, Rfl: 1  •  carvedilol (COREG) 25 MG tablet, Take 1 tablet by mouth 2 (Two) Times a Day With Meals., Disp: 180 tablet, Rfl: 3  •  chlorthalidone (HYGROTON) 25 MG tablet, Take 1 tablet by mouth Daily., Disp: 90 tablet, Rfl: 3  •  gabapentin (NEURONTIN) 600 MG tablet, Take 1 tablet by mouth 3 (Three) Times a Day. (Patient taking differently: Take 600 mg by mouth 2 (Two) Times a Day As Needed.), Disp: 90 tablet, Rfl: 2  •  HYDROcodone-acetaminophen (Norco) 7.5-325 MG per tablet, Take 1 tablet by mouth Daily As Needed for Moderate Pain. Must last 30 days, Disp: 20 tablet, Rfl: 0  •  hydroxychloroquine (PLAQUENIL) 200 MG tablet, Daily., Disp: , Rfl:   •  lisinopril (PRINIVIL,ZESTRIL) 40 MG tablet, Take 1 tablet by mouth Daily., Disp: 90 tablet, Rfl: 3  •  predniSONE (DELTASONE) 10 MG tablet, Take 40mg po  X 1 day, 30mg x 1 day, 20mg x 1 day, 10mg x 1 day, Disp: 10 tablet, Rfl: 0  •  sertraline (ZOLOFT) 50 MG tablet, Take 1 tablet by mouth Daily., Disp: 90 tablet, Rfl: 3  •  terazosin (HYTRIN) 5 MG capsule, Take 1 capsule by mouth Every Night., Disp: 30 capsule, Rfl: 1  •  folic acid (FOLVITE) 1 MG tablet, Take 1,000 mcg by mouth Daily., Disp: , Rfl:   •  methotrexate 2.5 MG tablet, Take 15 mg by mouth 1 (One) Time Per Week., Disp: , Rfl:  "    Physical Exam:  Vital Signs:   Vitals:    12/08/22 1026   BP: 114/70   BP Location: Left arm   Patient Position: Sitting   Cuff Size: Large Adult   Pulse: 76   Resp: 18   SpO2: 98%   Weight: (!) 142 kg (312 lb)   Height: 182.9 cm (72\")       Physical Exam  Constitutional:       General: He is not in acute distress.     Appearance: He is obese. He is not diaphoretic.   HENT:      Head: Normocephalic and atraumatic.   Cardiovascular:      Rate and Rhythm: Normal rate and regular rhythm.      Heart sounds: No murmur heard.  Pulmonary:      Effort: Pulmonary effort is normal. No respiratory distress.      Breath sounds: Normal breath sounds. No stridor. No wheezing, rhonchi or rales.   Abdominal:      General: Bowel sounds are normal. There is no distension.      Palpations: Abdomen is soft.      Tenderness: There is no abdominal tenderness. There is no guarding or rebound.   Musculoskeletal:         General: No swelling. Normal range of motion.      Cervical back: Neck supple. No tenderness.   Skin:     General: Skin is warm and dry.   Neurological:      General: No focal deficit present.      Mental Status: He is alert and oriented to person, place, and time.   Psychiatric:         Mood and Affect: Mood normal.         Behavior: Behavior normal.         Results Review:   I reviewed the patient's new clinical results.      Assessment / Plan:     1. Primary hypertension  -- Long history of difficulty control hypertension  -- BP checked twice in the office today, and the patient is normotensive.  Suspect some of his previously elevated readings could have been secondary to an undersized BP cuff  --Will continue current antihypertensives  -- The patient has ordered a larger bore home BP cuff, advised to start checking his BP at home and notify me if his systolic BP is consistently over 140 mmHg at which time would consider the addition of an additional antihypertensive  --Low-sodium diet  --Follow-up in 6 months, " sooner if required    2.  Stage II CKD  --Last labs in 4/22 showed creatinine 1.32, GFR 65     3.  Morbid obesity with BMI of 40.0-44.9  -- Weight loss advised as obesity likely contributing hypertension     Follow Up:   Return in about 6 months (around 6/8/2023).      Thank you for allowing me to participate in the care of your patient. Please to not hesitate to contact me with additional questions or concerns.     NEFTALI Mcgregor MD  Interventional Cardiology   12/08/2022  12:42 EST

## 2022-12-09 DIAGNOSIS — G89.29 CHRONIC BILATERAL LOW BACK PAIN WITH LEFT-SIDED SCIATICA: ICD-10-CM

## 2022-12-09 DIAGNOSIS — M54.42 CHRONIC BILATERAL LOW BACK PAIN WITH LEFT-SIDED SCIATICA: ICD-10-CM

## 2022-12-09 RX ORDER — GABAPENTIN 600 MG/1
600 TABLET ORAL 3 TIMES DAILY
Qty: 90 TABLET | Refills: 2 | Status: SHIPPED | OUTPATIENT
Start: 2022-12-09 | End: 2023-03-28 | Stop reason: SDUPTHER

## 2023-01-25 DIAGNOSIS — E66.01 CLASS 3 SEVERE OBESITY DUE TO EXCESS CALORIES WITH SERIOUS COMORBIDITY AND BODY MASS INDEX (BMI) OF 40.0 TO 44.9 IN ADULT: Primary | ICD-10-CM

## 2023-01-25 DIAGNOSIS — R73.03 PRE-DIABETES: ICD-10-CM

## 2023-01-25 DIAGNOSIS — R70.0 ELEVATED SED RATE: ICD-10-CM

## 2023-01-25 DIAGNOSIS — M32.9 LUPUS: ICD-10-CM

## 2023-01-26 LAB
ALBUMIN SERPL-MCNC: 4.5 G/DL (ref 3.5–5.2)
ALBUMIN/GLOB SERPL: 1.3 G/DL
ALP SERPL-CCNC: 85 U/L (ref 39–117)
ALT SERPL-CCNC: 62 U/L (ref 1–41)
AST SERPL-CCNC: 46 U/L (ref 1–40)
BASOPHILS # BLD AUTO: 0.04 10*3/MM3 (ref 0–0.2)
BASOPHILS NFR BLD AUTO: 0.7 % (ref 0–1.5)
BILIRUB SERPL-MCNC: 0.3 MG/DL (ref 0–1.2)
BUN SERPL-MCNC: 49 MG/DL (ref 6–20)
BUN/CREAT SERPL: 26.5 (ref 7–25)
CALCIUM SERPL-MCNC: 9.5 MG/DL (ref 8.6–10.5)
CHLORIDE SERPL-SCNC: 99 MMOL/L (ref 98–107)
CO2 SERPL-SCNC: 27.1 MMOL/L (ref 22–29)
CREAT SERPL-MCNC: 1.85 MG/DL (ref 0.76–1.27)
CRP SERPL-MCNC: <0.3 MG/DL (ref 0–0.5)
EGFRCR SERPLBLD CKD-EPI 2021: 43.6 ML/MIN/1.73
EOSINOPHIL # BLD AUTO: 0.26 10*3/MM3 (ref 0–0.4)
EOSINOPHIL NFR BLD AUTO: 4.4 % (ref 0.3–6.2)
ERYTHROCYTE [DISTWIDTH] IN BLOOD BY AUTOMATED COUNT: 14.7 % (ref 12.3–15.4)
ERYTHROCYTE [SEDIMENTATION RATE] IN BLOOD BY WESTERGREN METHOD: 35 MM/HR (ref 0–20)
GLOBULIN SER CALC-MCNC: 3.6 GM/DL
GLUCOSE SERPL-MCNC: 126 MG/DL (ref 65–99)
HCT VFR BLD AUTO: 39 % (ref 37.5–51)
HGB BLD-MCNC: 13.6 G/DL (ref 13–17.7)
IMM GRANULOCYTES # BLD AUTO: 0.02 10*3/MM3 (ref 0–0.05)
IMM GRANULOCYTES NFR BLD AUTO: 0.3 % (ref 0–0.5)
LYMPHOCYTES # BLD AUTO: 1.34 10*3/MM3 (ref 0.7–3.1)
LYMPHOCYTES NFR BLD AUTO: 22.4 % (ref 19.6–45.3)
MCH RBC QN AUTO: 35.1 PG (ref 26.6–33)
MCHC RBC AUTO-ENTMCNC: 34.9 G/DL (ref 31.5–35.7)
MCV RBC AUTO: 100.8 FL (ref 79–97)
MONOCYTES # BLD AUTO: 0.51 10*3/MM3 (ref 0.1–0.9)
MONOCYTES NFR BLD AUTO: 8.5 % (ref 5–12)
NEUTROPHILS # BLD AUTO: 3.8 10*3/MM3 (ref 1.7–7)
NEUTROPHILS NFR BLD AUTO: 63.7 % (ref 42.7–76)
NRBC BLD AUTO-RTO: 0.2 /100 WBC (ref 0–0.2)
PLATELET # BLD AUTO: 233 10*3/MM3 (ref 140–450)
POTASSIUM SERPL-SCNC: 4.7 MMOL/L (ref 3.5–5.2)
PROT SERPL-MCNC: 8.1 G/DL (ref 6–8.5)
RBC # BLD AUTO: 3.87 10*6/MM3 (ref 4.14–5.8)
SODIUM SERPL-SCNC: 136 MMOL/L (ref 136–145)
WBC # BLD AUTO: 5.97 10*3/MM3 (ref 3.4–10.8)

## 2023-01-27 NOTE — PROGRESS NOTES
Please fax labs to Dr. Edwards 37-year-old female with a history of L foot AVM and HTN who presents today for direct stick embolization of left foot AVM.     ASA II, Mallampati II    NS @75 ml/hr prior to procedure. 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     ASA II, Mallampati II    NS @75 ml/hr prior to procedure.     Pre op consent to be obtained by Dr. Teran and his staff.     Risks & benefits of procedure and alternative therapy have been explained to the patient including but not limited to: allergic reaction, bleeding w/possible need for blood transfusion, infection, renal and vascular compromise, limb damage, emergent surgery. Informed consent obtained and in chart. 37-year-old female with a history of L foot AVM (s/p 8 previous embolizations at [facility]) and HTN who presents today for direct stick embolization of left foot AVM.     ASA II, Mallampati II    WBC 11.6, pt is afebrile and denies signs and symptoms of infection. SAMM Bustamante made aware. NS @75 ml/hr prior to procedure.     Pre op consent to be obtained by Dr. Teran and his staff.     Risks & benefits of procedure and alternative therapy have been explained to the patient including but not limited to: allergic reaction, bleeding w/possible need for blood transfusion, infection, renal and vascular compromise, limb damage, emergent surgery. Informed consent obtained and in chart.

## 2023-01-31 ENCOUNTER — TELEPHONE (OUTPATIENT)
Dept: INTERNAL MEDICINE | Facility: CLINIC | Age: 53
End: 2023-01-31
Payer: COMMERCIAL

## 2023-02-01 DIAGNOSIS — N18.2 CKD (CHRONIC KIDNEY DISEASE) STAGE 2, GFR 60-89 ML/MIN: Primary | ICD-10-CM

## 2023-02-01 NOTE — TELEPHONE ENCOUNTER
"Received text from after hours service to contact patient. Reason given: calling about blood work and kidney failure wants to talk to someone tonight.    Contacted patient, identified myself and asked what was his emergency. He stated it wasn't an emergency but then that it was. Expressed frustration that he's reached out to his PCP's office about his labs and hasn't heard anything back. Also mentioned another doctor (sounded like Jerry) and he's not hearing back. Said something about how he has money to address things and something about \"you all calling\" when \"you want money\". Patient was cursing, using the f-word. When he started cursing I interjected to explain that I'm not able to help with this tonight and he asked why I called him back. He said this several times, and I explained that I must return the calls and I'm here to provide advice, and that if he felt he was having an emergency he needed to proceed to the ER. Patient continued to speak rudely to me so I explained again I am not able to help him tonight and that he can reach out to his PCP first thing in the morning to try to get some assistance. Patient continued to ask why I was hanging up as I ended the call.    Patient sounded well, no slurred speech, sounded very coherent. No signs of medical distress. Angry affect.     Will forward this message to both PCP and my .   "

## 2023-02-01 NOTE — TELEPHONE ENCOUNTER
I did not order his labs, I assumed Dr. Edwards ordered them. His kidney function is worse, needs repeat labs in 2 weeks, order placed.

## 2023-02-10 ENCOUNTER — OFFICE VISIT (OUTPATIENT)
Dept: FAMILY MEDICINE CLINIC | Facility: CLINIC | Age: 53
End: 2023-02-10
Payer: COMMERCIAL

## 2023-02-10 VITALS
HEART RATE: 55 BPM | BODY MASS INDEX: 42.1 KG/M2 | HEIGHT: 72 IN | DIASTOLIC BLOOD PRESSURE: 102 MMHG | OXYGEN SATURATION: 96 % | SYSTOLIC BLOOD PRESSURE: 138 MMHG | TEMPERATURE: 98 F | WEIGHT: 310.8 LBS

## 2023-02-10 DIAGNOSIS — M32.9 LUPUS: ICD-10-CM

## 2023-02-10 DIAGNOSIS — J20.9 ACUTE BRONCHITIS, UNSPECIFIED ORGANISM: ICD-10-CM

## 2023-02-10 DIAGNOSIS — G47.33 OSA (OBSTRUCTIVE SLEEP APNEA): ICD-10-CM

## 2023-02-10 DIAGNOSIS — E66.01 CLASS 3 SEVERE OBESITY DUE TO EXCESS CALORIES WITH SERIOUS COMORBIDITY AND BODY MASS INDEX (BMI) OF 40.0 TO 44.9 IN ADULT: ICD-10-CM

## 2023-02-10 DIAGNOSIS — G89.29 CHRONIC BILATERAL LOW BACK PAIN WITH LEFT-SIDED SCIATICA: Primary | ICD-10-CM

## 2023-02-10 DIAGNOSIS — M54.42 CHRONIC BILATERAL LOW BACK PAIN WITH LEFT-SIDED SCIATICA: Primary | ICD-10-CM

## 2023-02-10 DIAGNOSIS — M25.562 CHRONIC PAIN OF LEFT KNEE: ICD-10-CM

## 2023-02-10 DIAGNOSIS — N18.32 STAGE 3B CHRONIC KIDNEY DISEASE: ICD-10-CM

## 2023-02-10 DIAGNOSIS — I10 ESSENTIAL HYPERTENSION: ICD-10-CM

## 2023-02-10 DIAGNOSIS — G89.29 CHRONIC PAIN OF LEFT KNEE: ICD-10-CM

## 2023-02-10 PROCEDURE — 99214 OFFICE O/P EST MOD 30 MIN: CPT | Performed by: NURSE PRACTITIONER

## 2023-02-10 RX ORDER — AZITHROMYCIN 250 MG/1
TABLET, FILM COATED ORAL
Qty: 6 TABLET | Refills: 0 | Status: SHIPPED | OUTPATIENT
Start: 2023-02-10 | End: 2023-02-27

## 2023-02-10 RX ORDER — ALBUTEROL SULFATE 90 UG/1
2 AEROSOL, METERED RESPIRATORY (INHALATION) EVERY 4 HOURS PRN
Qty: 8 G | Refills: 0 | Status: SHIPPED | OUTPATIENT
Start: 2023-02-10 | End: 2023-03-28 | Stop reason: SDUPTHER

## 2023-02-10 RX ORDER — TIRZEPATIDE 2.5 MG/.5ML
2.5 INJECTION, SOLUTION SUBCUTANEOUS WEEKLY
Qty: 2 ML | Refills: 0 | Status: SHIPPED | OUTPATIENT
Start: 2023-02-10 | End: 2023-02-14

## 2023-02-10 NOTE — PROGRESS NOTES
Subjective     Chief Complaint:    Chief Complaint   Patient presents with   • Hypertension   • Cough     Pt sts he would like something for cough and to get mucus out of his symptoms x 1 mth       History of Present Illness:   Recently diagnosed with lupus and OA and elevated muscle enzymes. He is on hydroxychlorquine and mtx. Follows with Dr. Edwards.   He has not seen improvement in joint pain. Would like to see pain management.   Refractory HTN, on coreg, norvasc, chlorthalidone, lisinopril, and terazosin. Seeing cards and BP was normal there, no change in regimen  Mood disorder on zoloft  Chronic back pain, on osmany. Still has pain. Has taken steroids in the past without much improvement. Has seen NS but was not pleased with Dr. Dexter.   Cough x 1 month, wife and daughter have the same thing, started with runny nose, no new soa, has been taking OTC meds, clear sputum.   CKD, worsening      Review of Systems  Gen- No fevers, chills  CV- No chest pain, palpitations  Resp- No cough, dyspnea  GI- No N/V/D, abd pain  Neuro-No dizziness, headaches      I have reviewed and/or updated the patient's past medical, surgical, family, social history and problem list as appropriate.     Medications:    Current Outpatient Medications:   •  amLODIPine (NORVASC) 10 MG tablet, Take 1 tablet by mouth Daily., Disp: 90 tablet, Rfl: 1  •  carvedilol (COREG) 25 MG tablet, Take 1 tablet by mouth 2 (Two) Times a Day With Meals., Disp: 180 tablet, Rfl: 3  •  chlorthalidone (HYGROTON) 25 MG tablet, Take 1 tablet by mouth Daily., Disp: 90 tablet, Rfl: 3  •  folic acid (FOLVITE) 1 MG tablet, Take 1,000 mcg by mouth Daily., Disp: , Rfl:   •  gabapentin (NEURONTIN) 600 MG tablet, Take 1 tablet by mouth 3 (Three) Times a Day., Disp: 90 tablet, Rfl: 2  •  hydroxychloroquine (PLAQUENIL) 200 MG tablet, Daily., Disp: , Rfl:   •  lisinopril (PRINIVIL,ZESTRIL) 40 MG tablet, Take 1 tablet by mouth Daily., Disp: 90 tablet, Rfl: 3  •   "methotrexate 2.5 MG tablet, Take 15 mg by mouth 1 (One) Time Per Week., Disp: , Rfl:   •  sertraline (ZOLOFT) 50 MG tablet, Take 1 tablet by mouth Daily., Disp: 90 tablet, Rfl: 3  •  terazosin (HYTRIN) 5 MG capsule, Take 1 capsule by mouth Every Night., Disp: 30 capsule, Rfl: 1  •  albuterol sulfate  (90 Base) MCG/ACT inhaler, Inhale 2 puffs Every 4 (Four) Hours As Needed for Wheezing or Shortness of Air (cough)., Disp: 8 g, Rfl: 0  •  azithromycin (Zithromax Z-Salvador) 250 MG tablet, Take 2 tablets by mouth on day 1, then 1 tablet daily on days 2-5, Disp: 6 tablet, Rfl: 0  •  HYDROcodone-acetaminophen (Norco) 7.5-325 MG per tablet, Take 1 tablet by mouth Daily As Needed for Moderate Pain. Must last 30 days, Disp: 20 tablet, Rfl: 0  •  Semaglutide-Weight Management (Wegovy) 0.25 MG/0.5ML solution auto-injector, Inject 0.25 mg under the skin into the appropriate area as directed 1 (One) Time Per Week., Disp: 0.5 mL, Rfl: 0    Allergies:  No Known Allergies    Objective     Vital Signs:   Vitals:    02/10/23 1334 02/10/23 1409   BP: 150/100 (!) 138/102   Pulse: 55    Temp: 98 °F (36.7 °C)    SpO2: 96%    Weight: (!) 141 kg (310 lb 12.8 oz)    Height: 182.9 cm (72.01\")    PainSc:   8      Body mass index is 42.14 kg/m².    Physical Exam:    Physical Exam  Vitals and nursing note reviewed.   Constitutional:       Appearance: He is well-developed.   HENT:      Head: Normocephalic and atraumatic.   Eyes:      Pupils: Pupils are equal, round, and reactive to light.   Cardiovascular:      Rate and Rhythm: Normal rate and regular rhythm.      Heart sounds: Normal heart sounds.   Pulmonary:      Effort: Pulmonary effort is normal.      Breath sounds: Wheezing present.   Abdominal:      General: Bowel sounds are normal. There is no distension.      Palpations: Abdomen is soft.      Tenderness: There is no abdominal tenderness.   Musculoskeletal:         General: Swelling present.      Cervical back: Neck supple.   Skin:     " General: Skin is warm and dry.   Neurological:      General: No focal deficit present.      Mental Status: He is alert and oriented to person, place, and time.   Psychiatric:         Mood and Affect: Mood normal.         Behavior: Behavior normal.         Assessment / Plan     Assessment/Plan:   Problem List Items Addressed This Visit        Cardiac and Vasculature    Essential hypertension    Relevant Medications    carvedilol (COREG) 25 MG tablet    chlorthalidone (HYGROTON) 25 MG tablet    lisinopril (PRINIVIL,ZESTRIL) 40 MG tablet    amLODIPine (NORVASC) 10 MG tablet    terazosin (HYTRIN) 5 MG capsule       Endocrine and Metabolic    Class 3 severe obesity due to excess calories with serious comorbidity and body mass index (BMI) of 40.0 to 44.9 in adult ([facility])    Relevant Medications    Semaglutide-Weight Management (Wegovy) 0.25 MG/0.5ML solution auto-injector       Multi-system (Lupus, Sarcoid...)    Lupus ([facility])       Musculoskeletal and Injuries    Chronic pain of left knee    Relevant Medications    HYDROcodone-acetaminophen (Norco) 7.5-325 MG per tablet    Chronic bilateral low back pain with left-sided sciatica - Primary    Relevant Medications    HYDROcodone-acetaminophen (Norco) 7.5-325 MG per tablet    gabapentin (NEURONTIN) 600 MG tablet    Other Relevant Orders    Ambulatory Referral to Pain Management   Other Visit Diagnoses     MADELAINE (obstructive sleep apnea)        Relevant Orders    Ambulatory Referral to Pain Management    Stage 3b chronic kidney disease ([facility])        Relevant Orders    Ambulatory Referral to Nephrology    Acute bronchitis, unspecified organism        Relevant Medications    albuterol sulfate  (90 Base) MCG/ACT inhaler    azithromycin (Zithromax Z-Salvador) 250 MG tablet        -- continue current BP meds  -- keep f/u with rheum  -- will give him short course of norco for pain, discussed #20 must last 1 month. Not a good candidate for NSAIDs due to CKD and uncontrolled HTN  --  continues to have trouble loosing weight, trial wegovy    Follow up:  3 months    Electronically signed by AMY Escamilla   11/09/2022 13:44 EST      Please note that portions of this note were completed with a voice recognition program.

## 2023-02-13 ENCOUNTER — PRIOR AUTHORIZATION (OUTPATIENT)
Dept: FAMILY MEDICINE CLINIC | Facility: CLINIC | Age: 53
End: 2023-02-13
Payer: COMMERCIAL

## 2023-02-13 NOTE — TELEPHONE ENCOUNTER
A PRIOR AUTH HAS BEENS TARTED THROUGH COVER MY MEDS FOR MOUNJARO.    WAITING ON A RESPONSE FROM THE INSURANCE.    Key: [id number]

## 2023-02-14 ENCOUNTER — TELEPHONE (OUTPATIENT)
Dept: FAMILY MEDICINE CLINIC | Facility: CLINIC | Age: 53
End: 2023-02-14
Payer: COMMERCIAL

## 2023-02-14 DIAGNOSIS — E66.01 CLASS 3 SEVERE OBESITY DUE TO EXCESS CALORIES WITH SERIOUS COMORBIDITY AND BODY MASS INDEX (BMI) OF 40.0 TO 44.9 IN ADULT: ICD-10-CM

## 2023-02-14 DIAGNOSIS — R73.03 PRE-DIABETES: Primary | ICD-10-CM

## 2023-02-14 RX ORDER — TIRZEPATIDE 2.5 MG/.5ML
2.5 INJECTION, SOLUTION SUBCUTANEOUS WEEKLY
Qty: 2 ML | Refills: 0 | Status: CANCELLED | OUTPATIENT
Start: 2023-02-14

## 2023-02-14 RX ORDER — SEMAGLUTIDE 0.25 MG/.5ML
0.25 INJECTION, SOLUTION SUBCUTANEOUS WEEKLY
Qty: 0.5 ML | Refills: 0 | Status: SHIPPED | OUTPATIENT
Start: 2023-02-14 | End: 2023-02-27

## 2023-02-14 NOTE — TELEPHONE ENCOUNTER
Caller: Dharmesh Samuel    Relationship: Self    Best call back number: [phone]    Requested Prescriptions:   Requested Prescriptions     Pending Prescriptions Disp Refills   • Tirzepatide (Mounjaro) 2.5 MG/0.5ML solution pen-injector 2 mL 0     Sig: Inject 2.5 mg under the skin into the appropriate area as directed 1 (One) Time Per Week.        Pharmacy where request should be sent: [address] [phone] [facility] [phone]      Additional details provided by patient: PATIENT IS AT THE PHARMACY AND THEY TOLD HIM THAT ALEX BERRIOS NEEDS TO SEND A REFERRAL FOR MOUNJARO TO THE INSURANCE COMPANY [employer]. HE'S AT THE PHARMACY.    Does the patient have less than a 3 day supply:  [x] Yes  [] No    Would you like a call back once the refill request has been completed: [x] Yes [] No    If the office needs to give you a call back, can they leave a voicemail: [x] Yes [] No    Lexi Haegn Rep   02/14/23 10:38 EST

## 2023-02-27 ENCOUNTER — OFFICE VISIT (OUTPATIENT)
Dept: FAMILY MEDICINE CLINIC | Facility: CLINIC | Age: 53
End: 2023-02-27
Payer: COMMERCIAL

## 2023-02-27 VITALS
OXYGEN SATURATION: 96 % | WEIGHT: 306 LBS | DIASTOLIC BLOOD PRESSURE: 100 MMHG | TEMPERATURE: 98.3 F | HEIGHT: 72 IN | BODY MASS INDEX: 41.45 KG/M2 | SYSTOLIC BLOOD PRESSURE: 150 MMHG | HEART RATE: 64 BPM

## 2023-02-27 DIAGNOSIS — F33.0 MILD EPISODE OF RECURRENT MAJOR DEPRESSIVE DISORDER: Primary | ICD-10-CM

## 2023-02-27 PROCEDURE — 99213 OFFICE O/P EST LOW 20 MIN: CPT | Performed by: NURSE PRACTITIONER

## 2023-02-27 RX ORDER — DESVENLAFAXINE SUCCINATE 50 MG/1
50 TABLET, EXTENDED RELEASE ORAL DAILY
Qty: 30 TABLET | Refills: 1 | Status: SHIPPED | OUTPATIENT
Start: 2023-02-27 | End: 2023-03-28

## 2023-02-27 RX ORDER — FOLIC ACID 1 MG/1
1000 TABLET ORAL DAILY
Qty: 90 TABLET | Refills: 3 | Status: SHIPPED | OUTPATIENT
Start: 2023-02-27 | End: 2023-03-28 | Stop reason: SDUPTHER

## 2023-02-27 NOTE — PROGRESS NOTES
Subjective     Chief Complaint:    Chief Complaint   Patient presents with   • Depression     Pt wants to discuss maybe taking something else.   • Allergies     Pt sts he bought a nasal spray and it seemed to help better than a Abx to clear his head congestion up.       History of Present Illness:   Worsening depression, struggling with pain, lack of doing things we wants/needs to do, etoh, drinks several hard liquior drinks nightly, has been on zoloft for several years, + situational stress  bronchitits type symptoms, improved with allergy nasal spray, azith did not help much    Review of Systems  Gen- No fevers, chills  CV- No chest pain, palpitations  Resp- No cough, dyspnea  GI- No N/V/D, abd pain  Neuro-No dizziness, headaches      I have reviewed and/or updated the patient's past medical, surgical, family, social history and problem list as appropriate.     Medications:    Current Outpatient Medications:   •  albuterol sulfate  (90 Base) MCG/ACT inhaler, Inhale 2 puffs Every 4 (Four) Hours As Needed for Wheezing or Shortness of Air (cough)., Disp: 8 g, Rfl: 0  •  amLODIPine (NORVASC) 10 MG tablet, Take 1 tablet by mouth Daily., Disp: 90 tablet, Rfl: 1  •  carvedilol (COREG) 25 MG tablet, Take 1 tablet by mouth 2 (Two) Times a Day With Meals., Disp: 180 tablet, Rfl: 3  •  chlorthalidone (HYGROTON) 25 MG tablet, Take 1 tablet by mouth Daily., Disp: 90 tablet, Rfl: 3  •  folic acid (FOLVITE) 1 MG tablet, Take 1 tablet by mouth Daily., Disp: 90 tablet, Rfl: 3  •  gabapentin (NEURONTIN) 600 MG tablet, Take 1 tablet by mouth 3 (Three) Times a Day., Disp: 90 tablet, Rfl: 2  •  hydroxychloroquine (PLAQUENIL) 200 MG tablet, Daily., Disp: , Rfl:   •  lisinopril (PRINIVIL,ZESTRIL) 40 MG tablet, Take 1 tablet by mouth Daily., Disp: 90 tablet, Rfl: 3  •  methotrexate 2.5 MG tablet, Take 15 mg by mouth 1 (One) Time Per Week., Disp: , Rfl:   •  terazosin (HYTRIN) 5 MG capsule, Take 1 capsule by mouth Every  "Night., Disp: 30 capsule, Rfl: 1  •  desvenlafaxine (Pristiq) 50 MG 24 hr tablet, Take 1 tablet by mouth Daily., Disp: 30 tablet, Rfl: 1  •  HYDROcodone-acetaminophen (Norco) 7.5-325 MG per tablet, Take 1 tablet by mouth Daily As Needed for Moderate Pain. Must last 30 days, Disp: 20 tablet, Rfl: 0    Allergies:  No Known Allergies    Objective     Vital Signs:   Vitals:    02/27/23 1343   BP: 150/100   Pulse: 64   Temp: 98.3 °F (36.8 °C)   SpO2: 96%   Weight: (!) 139 kg (306 lb)   Height: 182.9 cm (72.01\")   PainSc:   8     Body mass index is 41.49 kg/m².    Physical Exam:    Physical Exam  Vitals and nursing note reviewed.   Constitutional:       Appearance: He is well-developed.   HENT:      Head: Normocephalic and atraumatic.   Eyes:      Pupils: Pupils are equal, round, and reactive to light.   Cardiovascular:      Rate and Rhythm: Normal rate.   Pulmonary:      Effort: Pulmonary effort is normal.   Musculoskeletal:      Cervical back: Neck supple.   Skin:     General: Skin is warm and dry.   Neurological:      General: No focal deficit present.      Mental Status: He is alert and oriented to person, place, and time.   Psychiatric:         Mood and Affect: Mood normal.         Behavior: Behavior normal.         Assessment / Plan     Assessment/Plan:   Problem List Items Addressed This Visit    None  Visit Diagnoses     Mild episode of recurrent major depressive disorder (HCC)    -  Primary    Relevant Medications    desvenlafaxine (Pristiq) 50 MG 24 hr tablet        -- change zoloft to pristiq, cross titration discussed    Discussed plan of care in detail with pt today; pt verb understanding and agrees.    Follow up:  Return in about 4 weeks (around 3/27/2023).      Electronically signed by AMY Escamilla   02/27/2023 14:13 EST      Please note that portions of this note were completed with a voice recognition program.   "

## 2023-03-28 ENCOUNTER — OFFICE VISIT (OUTPATIENT)
Dept: FAMILY MEDICINE CLINIC | Facility: CLINIC | Age: 53
End: 2023-03-28
Payer: COMMERCIAL

## 2023-03-28 VITALS
BODY MASS INDEX: 42.44 KG/M2 | SYSTOLIC BLOOD PRESSURE: 165 MMHG | HEART RATE: 64 BPM | OXYGEN SATURATION: 96 % | WEIGHT: 313 LBS | DIASTOLIC BLOOD PRESSURE: 100 MMHG | TEMPERATURE: 98.2 F

## 2023-03-28 DIAGNOSIS — M54.42 CHRONIC BILATERAL LOW BACK PAIN WITH LEFT-SIDED SCIATICA: ICD-10-CM

## 2023-03-28 DIAGNOSIS — G89.29 CHRONIC BILATERAL LOW BACK PAIN WITH LEFT-SIDED SCIATICA: ICD-10-CM

## 2023-03-28 DIAGNOSIS — J20.9 ACUTE BRONCHITIS, UNSPECIFIED ORGANISM: ICD-10-CM

## 2023-03-28 DIAGNOSIS — I10 ESSENTIAL HYPERTENSION: ICD-10-CM

## 2023-03-28 PROCEDURE — 99214 OFFICE O/P EST MOD 30 MIN: CPT | Performed by: NURSE PRACTITIONER

## 2023-03-28 RX ORDER — TERAZOSIN 5 MG/1
5 CAPSULE ORAL NIGHTLY
Qty: 90 CAPSULE | Refills: 1 | Status: SHIPPED | OUTPATIENT
Start: 2023-03-28

## 2023-03-28 RX ORDER — CHLORTHALIDONE 25 MG/1
25 TABLET ORAL DAILY
Qty: 90 TABLET | Refills: 3 | Status: SHIPPED | OUTPATIENT
Start: 2023-03-28

## 2023-03-28 RX ORDER — FOLIC ACID 1 MG/1
1000 TABLET ORAL DAILY
Qty: 90 TABLET | Refills: 3 | Status: SHIPPED | OUTPATIENT
Start: 2023-03-28

## 2023-03-28 RX ORDER — AMLODIPINE BESYLATE 10 MG/1
10 TABLET ORAL DAILY
Qty: 90 TABLET | Refills: 1 | Status: SHIPPED | OUTPATIENT
Start: 2023-03-28

## 2023-03-28 RX ORDER — CARVEDILOL 25 MG/1
25 TABLET ORAL 2 TIMES DAILY WITH MEALS
Qty: 180 TABLET | Refills: 3 | Status: SHIPPED | OUTPATIENT
Start: 2023-03-28

## 2023-03-28 RX ORDER — TERAZOSIN 5 MG/1
5 CAPSULE ORAL NIGHTLY
Qty: 30 CAPSULE | Refills: 1 | Status: SHIPPED | OUTPATIENT
Start: 2023-03-28 | End: 2023-03-28 | Stop reason: SDUPTHER

## 2023-03-28 RX ORDER — GABAPENTIN 600 MG/1
600 TABLET ORAL 3 TIMES DAILY
Qty: 90 TABLET | Refills: 2 | Status: SHIPPED | OUTPATIENT
Start: 2023-03-28

## 2023-03-28 RX ORDER — ESCITALOPRAM OXALATE 10 MG/1
10 TABLET ORAL EVERY MORNING
Qty: 30 TABLET | Refills: 1 | Status: SHIPPED | OUTPATIENT
Start: 2023-03-28

## 2023-03-28 RX ORDER — LISINOPRIL 40 MG/1
40 TABLET ORAL DAILY
Qty: 90 TABLET | Refills: 3 | Status: SHIPPED | OUTPATIENT
Start: 2023-03-28

## 2023-03-28 RX ORDER — ALBUTEROL SULFATE 90 UG/1
2 AEROSOL, METERED RESPIRATORY (INHALATION) EVERY 4 HOURS PRN
Qty: 8 G | Refills: 0 | Status: SHIPPED | OUTPATIENT
Start: 2023-03-28

## 2023-03-28 NOTE — PROGRESS NOTES
Subjective     Chief Complaint:    Chief Complaint   Patient presents with   • Depression     Pt sts here for FU, does sound like he has head cold       History of Present Illness:   4-week follow-up depression, he was transition to Pristiq from Zoloft last visit.  He notes worsening mood, more irritability.  Historically has done well with Lexapro in the past.  Continues to drink 3-4 hard liquor drinks in the evening.  Needs refills on some of his blood pressure medicines, has seen cardiology  CKD follows with nephrology  Request refill of albuterol inhaler, used for postviral cough  Chronic back pain, on gabapentin, has seen neurology and most recently pain management    Review of Systems  Gen- No fevers, chills  CV- No chest pain, palpitations  Resp- No cough, dyspnea  GI- No N/V/D, abd pain  Neuro-No dizziness, headaches      I have reviewed and/or updated the patient's past medical, surgical, family, social history and problem list as appropriate.     Medications:    Current Outpatient Medications:   •  albuterol sulfate  (90 Base) MCG/ACT inhaler, Inhale 2 puffs Every 4 (Four) Hours As Needed for Wheezing or Shortness of Air (cough)., Disp: 8 g, Rfl: 0  •  amLODIPine (NORVASC) 10 MG tablet, Take 1 tablet by mouth Daily., Disp: 90 tablet, Rfl: 1  •  carvedilol (COREG) 25 MG tablet, Take 1 tablet by mouth 2 (Two) Times a Day With Meals., Disp: 180 tablet, Rfl: 3  •  chlorthalidone (HYGROTON) 25 MG tablet, Take 1 tablet by mouth Daily., Disp: 90 tablet, Rfl: 3  •  folic acid (FOLVITE) 1 MG tablet, Take 1 tablet by mouth Daily., Disp: 90 tablet, Rfl: 3  •  gabapentin (NEURONTIN) 600 MG tablet, Take 1 tablet by mouth 3 (Three) Times a Day., Disp: 90 tablet, Rfl: 2  •  hydroxychloroquine (PLAQUENIL) 200 MG tablet, Daily., Disp: , Rfl:   •  lisinopril (PRINIVIL,ZESTRIL) 40 MG tablet, Take 1 tablet by mouth Daily., Disp: 90 tablet, Rfl: 3  •  methotrexate 2.5 MG tablet, Take 6 tablets by mouth 1 (One)  Time Per Week., Disp: , Rfl:   •  terazosin (HYTRIN) 5 MG capsule, Take 1 capsule by mouth Every Night., Disp: 90 capsule, Rfl: 1  •  escitalopram (Lexapro) 10 MG tablet, Take 1 tablet by mouth Every Morning., Disp: 30 tablet, Rfl: 1    Allergies:  No Known Allergies    Objective     Vital Signs:   Vitals:    03/28/23 1344   BP: 165/100   Pulse: 64   Temp: 98.2 °F (36.8 °C)   SpO2: 96%   Weight: (!) 142 kg (313 lb)   PainSc:   7     Body mass index is 42.44 kg/m².    Physical Exam:    Physical Exam  Vitals and nursing note reviewed.   Constitutional:       Appearance: He is well-developed.   HENT:      Head: Normocephalic and atraumatic.   Eyes:      Pupils: Pupils are equal, round, and reactive to light.   Cardiovascular:      Rate and Rhythm: Normal rate and regular rhythm.      Heart sounds: Normal heart sounds.   Pulmonary:      Effort: Pulmonary effort is normal.      Breath sounds: Normal breath sounds.   Musculoskeletal:      Cervical back: Neck supple.   Skin:     General: Skin is warm and dry.   Neurological:      Mental Status: He is alert and oriented to person, place, and time.   Psychiatric:         Mood and Affect: Mood normal.         Behavior: Behavior normal.         Assessment / Plan     Assessment/Plan:   Problem List Items Addressed This Visit        Cardiac and Vasculature    Essential hypertension    Relevant Medications    amLODIPine (NORVASC) 10 MG tablet    carvedilol (COREG) 25 MG tablet    chlorthalidone (HYGROTON) 25 MG tablet    lisinopril (PRINIVIL,ZESTRIL) 40 MG tablet    terazosin (HYTRIN) 5 MG capsule       Musculoskeletal and Injuries    Chronic bilateral low back pain with left-sided sciatica    Relevant Medications    gabapentin (NEURONTIN) 600 MG tablet   Other Visit Diagnoses     Acute bronchitis, unspecified organism        Relevant Medications    albuterol sulfate  (90 Base) MCG/ACT inhaler        --Stop Pristiq, start escitalopram.  Medication discussed  --Blood  pressure not controlled, meds refilled he has been out of Norvasc for a week  --Continue gabapentin.  He is not a good candidate for NSAIDs given his CKD, pain management note reviewed    Discussed plan of care in detail with pt today; pt verb understanding and agrees.    Follow up:  Return in about 3 months (around 6/28/2023).    Electronically signed by AMY Escamilla   03/28/2023 14:13 EDT      Please note that portions of this note were completed with a voice recognition program.

## 2023-04-12 ENCOUNTER — TELEPHONE (OUTPATIENT)
Dept: FAMILY MEDICINE CLINIC | Facility: CLINIC | Age: 53
End: 2023-04-12

## 2023-04-12 NOTE — TELEPHONE ENCOUNTER
Caller: Dharmesh Samuel    Relationship: Self    Best call back number:      [phone]      What is the best time to reach you:     A.M. UNTIL NOON    Who are you requesting to speak with (clinical staff, provider,  specific staff member):     ALEX BERRIOS OR REFERRAL COORDINATOR    What was the call regarding:     PATIENT STATED HE IS UNABLE TO GET IN TOUCH WITH HIS RHEUMATOLOGIST, AND HE IS IN GREAT PAIN    PATIENT REQUESTED HELP IN CONTACTING HIS RHEUMATOLOGIST AND TO PROVIDE ANY RECOMMENDATIONS/NEXT STEPS    Do you require a callback:     YES

## 2023-04-17 NOTE — TELEPHONE ENCOUNTER
Caller: Eddy Samuel    Relationship: Self    Best call back number: [phone]    What is the best time to reach you: ANYTIME     Who are you requesting to speak with (clinical staff, provider,  specific staff member): CLINICAL STAFF     Do you know the name of the person who called? EDDY     What was the call regarding: THE PATIENT REPORTS THAT HE IS IN EXCRUCIATING PAIN AND IS REQUESTING A CALL BACK. THE PATIENT REPORTS THAT HE CANNOT GET A HOLD OF HIS RHEUMATOLOGIST AND IS REQUESTING A CALL BACK FROM THE PRACTICE ASAP.     Do you require a callback: YES, PLEASE CALL AND ADVISE

## 2023-04-21 ENCOUNTER — TELEPHONE (OUTPATIENT)
Dept: FAMILY MEDICINE CLINIC | Facility: CLINIC | Age: 53
End: 2023-04-21
Payer: COMMERCIAL

## 2023-04-21 DIAGNOSIS — F33.0 MILD EPISODE OF RECURRENT MAJOR DEPRESSIVE DISORDER: ICD-10-CM

## 2023-04-21 NOTE — TELEPHONE ENCOUNTER
PATIENT CALLED IN AND WENT TO CLINIC IN [place] SAID HIS BP /70 HIS SUGAR  HE FEELS LIGHT HEADED AND HIS HEART IS SKIPPING BEATS. ALSO WOULD LIKE RESULT OF HIS SONOGRAM THAT HE HAD DONE AT [facility]. HE IS COMING IN FOR AN APPT. Tuesday TO SEE ALEX. I ADVISED HIM IF HE FEELS BAD TO GO TO ER

## 2023-04-24 RX ORDER — DESVENLAFAXINE SUCCINATE 50 MG/1
TABLET, EXTENDED RELEASE ORAL
Qty: 30 TABLET | Refills: 0 | OUTPATIENT
Start: 2023-04-24

## 2023-04-25 ENCOUNTER — OFFICE VISIT (OUTPATIENT)
Dept: FAMILY MEDICINE CLINIC | Facility: CLINIC | Age: 53
End: 2023-04-25
Payer: COMMERCIAL

## 2023-04-25 VITALS
RESPIRATION RATE: 16 BRPM | HEIGHT: 72 IN | BODY MASS INDEX: 40.36 KG/M2 | OXYGEN SATURATION: 99 % | SYSTOLIC BLOOD PRESSURE: 134 MMHG | DIASTOLIC BLOOD PRESSURE: 90 MMHG | HEART RATE: 71 BPM | TEMPERATURE: 97 F | WEIGHT: 298 LBS

## 2023-04-25 DIAGNOSIS — N18.2 CKD (CHRONIC KIDNEY DISEASE) STAGE 2, GFR 60-89 ML/MIN: ICD-10-CM

## 2023-04-25 DIAGNOSIS — F33.0 MILD EPISODE OF RECURRENT MAJOR DEPRESSIVE DISORDER: ICD-10-CM

## 2023-04-25 DIAGNOSIS — I10 ESSENTIAL HYPERTENSION: Primary | ICD-10-CM

## 2023-04-25 RX ORDER — TORSEMIDE 100 MG/1
100 TABLET ORAL
COMMUNITY
Start: 2023-03-30

## 2023-04-25 NOTE — PROGRESS NOTES
Subjective     Chief Complaint:    Chief Complaint   Patient presents with   • Hypotension       History of Present Illness:   Had an episide of hypotension (80/40) and pre syncope. He unknowingly was taking double his coreg dose. He was also started on demedex 100mg by nephrology. Since he is no longer taking double coreg his symptoms are better. BP is better. Mood is better with lexparo. He is loosing weight. Drinking more water. Has upcoming appt with renal. Renal US reviewed     Review of Systems  Gen- No fevers, chills  CV- No chest pain, palpitations  Resp- No cough, dyspnea  GI- No N/V/D, abd pain  Neuro-No dizziness, headaches      I have reviewed and/or updated the patient's past medical, surgical, family, social history and problem list as appropriate.     Medications:    Current Outpatient Medications:   •  albuterol sulfate  (90 Base) MCG/ACT inhaler, Inhale 2 puffs Every 4 (Four) Hours As Needed for Wheezing or Shortness of Air (cough)., Disp: 8 g, Rfl: 0  •  amLODIPine (NORVASC) 10 MG tablet, Take 1 tablet by mouth Daily., Disp: 90 tablet, Rfl: 1  •  escitalopram (Lexapro) 10 MG tablet, Take 1 tablet by mouth Every Morning., Disp: 30 tablet, Rfl: 1  •  folic acid (FOLVITE) 1 MG tablet, Take 1 tablet by mouth Daily., Disp: 90 tablet, Rfl: 3  •  gabapentin (NEURONTIN) 600 MG tablet, Take 1 tablet by mouth 3 (Three) Times a Day., Disp: 90 tablet, Rfl: 2  •  hydroxychloroquine (PLAQUENIL) 200 MG tablet, Daily., Disp: , Rfl:   •  lisinopril (PRINIVIL,ZESTRIL) 40 MG tablet, Take 1 tablet by mouth Daily., Disp: 90 tablet, Rfl: 3  •  methotrexate 2.5 MG tablet, Take 6 tablets by mouth 1 (One) Time Per Week., Disp: , Rfl:   •  torsemide (DEMADEX) 100 MG tablet, Take 1 tablet by mouth., Disp: , Rfl:   •  carvedilol (COREG) 25 MG tablet, Take 1 tablet by mouth 2 (Two) Times a Day With Meals. (Patient not taking: Reported on 4/25/2023), Disp: 180 tablet, Rfl: 3  •  terazosin (HYTRIN) 5 MG capsule,  "Take 1 capsule by mouth Every Night. (Patient not taking: Reported on 4/25/2023), Disp: 90 capsule, Rfl: 1    Allergies:  No Known Allergies    Objective     Vital Signs:   Vitals:    04/25/23 1633   BP: 134/90   Pulse: 71   Resp: 16   Temp: 97 °F (36.1 °C)   SpO2: 99%   Weight: 135 kg (298 lb)   Height: 182.9 cm (72\")     Body mass index is 40.42 kg/m².    Physical Exam:    Physical Exam  Vitals and nursing note reviewed.   Constitutional:       Appearance: He is well-developed. He is obese.   HENT:      Head: Normocephalic and atraumatic.   Eyes:      Pupils: Pupils are equal, round, and reactive to light.   Cardiovascular:      Rate and Rhythm: Normal rate and regular rhythm.      Heart sounds: Normal heart sounds.   Pulmonary:      Effort: Pulmonary effort is normal.      Breath sounds: Normal breath sounds.   Abdominal:      General: Bowel sounds are normal. There is no distension.      Palpations: Abdomen is soft.      Tenderness: There is no abdominal tenderness.   Musculoskeletal:      Cervical back: Neck supple.   Skin:     General: Skin is warm and dry.      Capillary Refill: Capillary refill takes less than 2 seconds.   Neurological:      General: No focal deficit present.      Mental Status: He is alert and oriented to person, place, and time.   Psychiatric:         Mood and Affect: Mood normal.         Behavior: Behavior normal.         Assessment / Plan     Assessment/Plan:   Problem List Items Addressed This Visit        Cardiac and Vasculature    Essential hypertension - Primary    Relevant Medications    amLODIPine (NORVASC) 10 MG tablet    carvedilol (COREG) 25 MG tablet    lisinopril (PRINIVIL,ZESTRIL) 40 MG tablet    terazosin (HYTRIN) 5 MG capsule    torsemide (DEMADEX) 100 MG tablet       Genitourinary and Reproductive     CKD (chronic kidney disease) stage 2, GFR 60-89 ml/min    Relevant Medications    torsemide (DEMADEX) 100 MG tablet       Mental Health    Mild episode of recurrent major " depressive disorder    Relevant Medications    escitalopram (Lexapro) 10 MG tablet     -- BP better, continue current regimen and keep f/u with nephro  -- mood better on lexapro, continue    Discussed plan of care in detail with pt today; pt verb understanding and agrees.    Follow up:  As scheduled    Electronically signed by AMY Escamilla   04/25/2023 17:19 EDT      Please note that portions of this note were completed with a voice recognition program.

## 2023-04-25 NOTE — TELEPHONE ENCOUNTER
LATE ENTRY!    I SPOKE WITH PT ON 4/21. PT CALLED STATING THAT THE MEDICINE HE IS TAKING IS NOT WORKING. PT WAS RUDE AND NOT HAPPY THAT ALEX WAS NOT HERE TO ADDRESS THIS ISSUE. I ASKED PT WHAT HE WAS TAKING AND HIS WIFE SPOKE UP AND SAID THAT HE HAD TRIPLED THE AMLODOPINE AND CALLED HIM AN IDIOT AND SAID THIS IS WHY NOBODY WILL HELP YOU. I ADVISED PT AND WIFE THAT HE IS TO ONLY TAKE 1 A DAY. PT AND WIFE VERBALIZED UNDERSTANDING AND STATED THAT HE WOULD JUST DO 1 A DAY.

## 2023-04-26 ENCOUNTER — TELEPHONE (OUTPATIENT)
Dept: INTERNAL MEDICINE | Facility: CLINIC | Age: 53
End: 2023-04-26
Payer: COMMERCIAL

## 2023-04-26 LAB
EXPIRATION DATE: NORMAL
HBA1C MFR BLD: 6.2 %
Lab: NORMAL

## 2023-04-27 NOTE — TELEPHONE ENCOUNTER
"Patient called the on call provider reporting low blood pressure of 76/56.  He states his wife is an RN and took his BP.  He admits to muscle pain and cramps in his shoulders and neck.  He denies feeling lightheaded or dizzy, but reports he is having to lean on something to hold himself up.  He was seen by PCP yesterday for BP.  He states he did not double up on any BP medication today.  He reports BP readings have been fluctuating.  When I mentioned possible dehydration due to him working in his garden and also being on diuretics he stated, \"no ma'am, no ma'am, no ma'am. I drink water and gatorade all day long.\"  He repeatedly states he feels like it is his time and is scared.  I mentioned several times during the call that he needed to proceed to the ER to be evaluated as this is not something that I can help him with over the phone.  He initially stated he didn't have money to go to the ER, but later talks about how he can pay for whatever he needed to.  He cursed a few times and then apologized for his language.  His speech is somewhat slurred.  He states he just wants help and wants to know what is going on.  He insinuates he was treated poorly by clinic staff yesterday.  He makes comments about how he's sure I need to take a break and get a \"cappicino or croissant\".  He made comments about paying Hindu and \"y'all are getting paid\".  He later thanked me for being nice to him and talking with him.  I was very direct about my limitations with treating over the phone.  As above, I advised him again to go to the ER and advised I would send his PCP a message regarding his BP concerns.  He did not confirm that he would proceed to the ER, but states he feels better.  I spent a total of 18 minutes and 31 sec on the phone with this patient. I do have concerns of possible intoxication with his speech and his comments made during the phone call.    "

## 2023-04-28 NOTE — TELEPHONE ENCOUNTER
Please call Dharmesh and tell him to hold on his torsemide for a few days and make sure he is not also taking chlorthidone. Needs to keep f/u appt with nephrology. Patient is a chronic etoh drinker as well.

## 2023-05-01 ENCOUNTER — TELEPHONE (OUTPATIENT)
Dept: FAMILY MEDICINE CLINIC | Facility: CLINIC | Age: 53
End: 2023-05-01

## 2023-05-01 PROCEDURE — 99284 EMERGENCY DEPT VISIT MOD MDM: CPT

## 2023-05-01 NOTE — TELEPHONE ENCOUNTER
PATIENT HAS A PRESCRIPTION FOR PREDNISONE, STATES HE LOADED UP ON THE PREDNISONE BECAUSE HIS WIFE TOLD HIM TO TAKE MORE THAN HOW IT WAS WRITTEN, PLEASE CALL AND ADVISE.

## 2023-05-01 NOTE — TELEPHONE ENCOUNTER
Caller: Dharmesh Samuel    Relationship: Self    Best call back number:    [phone]        What medication are you requesting: NYSTATIN POWDER   What are your current symptoms: RASH ON BUTTOCKS FROM DIARRHEA  How long have you been experiencing symptoms: 2 WEEKS NOT RESOLVED  Have you had these symptoms before:    [x] Yes  [] No    Have you been treated for these symptoms before:   [x] Yes  [] No    If a prescription is needed, what is your preferred pharmacy and phone number: [facility] [address] [place] [address] [phone] [facility] [phone]      Additional notes:   PLEASE CALL WHEN SENT

## 2023-05-02 ENCOUNTER — HOSPITAL ENCOUNTER (EMERGENCY)
Facility: HOSPITAL | Age: 53
Discharge: HOME | End: 2023-05-02
Attending: EMERGENCY MEDICINE | Admitting: EMERGENCY MEDICINE
Payer: COMMERCIAL

## 2023-05-02 VITALS
BODY MASS INDEX: 39.28 KG/M2 | RESPIRATION RATE: 18 BRPM | DIASTOLIC BLOOD PRESSURE: 69 MMHG | SYSTOLIC BLOOD PRESSURE: 118 MMHG | HEART RATE: 76 BPM | WEIGHT: 290 LBS | TEMPERATURE: 97.6 F | HEIGHT: 72 IN | OXYGEN SATURATION: 98 %

## 2023-05-02 DIAGNOSIS — R23.8 SKIN BREAKDOWN: ICD-10-CM

## 2023-05-02 DIAGNOSIS — N19 UREMIA: ICD-10-CM

## 2023-05-02 DIAGNOSIS — N19 RENAL FAILURE, UNSPECIFIED CHRONICITY: Primary | ICD-10-CM

## 2023-05-02 LAB
ALBUMIN SERPL-MCNC: 4.2 G/DL (ref 3.5–5.2)
ALBUMIN/GLOB SERPL: 0.9 G/DL
ALP SERPL-CCNC: 64 U/L (ref 39–117)
ALT SERPL W P-5'-P-CCNC: 48 U/L (ref 1–41)
ANION GAP SERPL CALCULATED.3IONS-SCNC: 15.2 MMOL/L (ref 5–15)
AST SERPL-CCNC: 37 U/L (ref 1–40)
BACTERIA UR QL AUTO: ABNORMAL /HPF
BASOPHILS # BLD AUTO: 0.03 10*3/MM3 (ref 0–0.2)
BASOPHILS NFR BLD AUTO: 0.4 % (ref 0–1.5)
BILIRUB SERPL-MCNC: 0.4 MG/DL (ref 0–1.2)
BILIRUB UR QL STRIP: NEGATIVE
BUN SERPL-MCNC: 80 MG/DL (ref 6–20)
BUN/CREAT SERPL: 39.4 (ref 7–25)
CALCIUM SPEC-SCNC: 9.3 MG/DL (ref 8.6–10.5)
CHLORIDE SERPL-SCNC: 92 MMOL/L (ref 98–107)
CLARITY UR: CLEAR
CO2 SERPL-SCNC: 24.8 MMOL/L (ref 22–29)
COLOR UR: YELLOW
CREAT SERPL-MCNC: 2.03 MG/DL (ref 0.76–1.27)
DEPRECATED RDW RBC AUTO: 44.5 FL (ref 37–54)
EGFRCR SERPLBLD CKD-EPI 2021: 39 ML/MIN/1.73
EOSINOPHIL # BLD AUTO: 0.41 10*3/MM3 (ref 0–0.4)
EOSINOPHIL NFR BLD AUTO: 5.6 % (ref 0.3–6.2)
ERYTHROCYTE [DISTWIDTH] IN BLOOD BY AUTOMATED COUNT: 12.6 % (ref 12.3–15.4)
GLOBULIN UR ELPH-MCNC: 4.6 GM/DL
GLUCOSE SERPL-MCNC: 83 MG/DL (ref 65–99)
GLUCOSE UR STRIP-MCNC: NEGATIVE MG/DL
HCT VFR BLD AUTO: 39.9 % (ref 37.5–51)
HGB BLD-MCNC: 14.3 G/DL (ref 13–17.7)
HGB UR QL STRIP.AUTO: ABNORMAL
HYALINE CASTS UR QL AUTO: ABNORMAL /LPF
IMM GRANULOCYTES # BLD AUTO: 0.02 10*3/MM3 (ref 0–0.05)
IMM GRANULOCYTES NFR BLD AUTO: 0.3 % (ref 0–0.5)
KETONES UR QL STRIP: NEGATIVE
LEUKOCYTE ESTERASE UR QL STRIP.AUTO: NEGATIVE
LYMPHOCYTES # BLD AUTO: 2.34 10*3/MM3 (ref 0.7–3.1)
LYMPHOCYTES NFR BLD AUTO: 31.8 % (ref 19.6–45.3)
MCH RBC QN AUTO: 35.1 PG (ref 26.6–33)
MCHC RBC AUTO-ENTMCNC: 35.8 G/DL (ref 31.5–35.7)
MCV RBC AUTO: 98 FL (ref 79–97)
MONOCYTES # BLD AUTO: 0.59 10*3/MM3 (ref 0.1–0.9)
MONOCYTES NFR BLD AUTO: 8 % (ref 5–12)
NEUTROPHILS NFR BLD AUTO: 3.98 10*3/MM3 (ref 1.7–7)
NEUTROPHILS NFR BLD AUTO: 53.9 % (ref 42.7–76)
NITRITE UR QL STRIP: NEGATIVE
NRBC BLD AUTO-RTO: 0 /100 WBC (ref 0–0.2)
PH UR STRIP.AUTO: 5.5 [PH] (ref 5–8)
PLATELET # BLD AUTO: 246 10*3/MM3 (ref 140–450)
PMV BLD AUTO: 9.4 FL (ref 6–12)
POTASSIUM SERPL-SCNC: 3.2 MMOL/L (ref 3.5–5.2)
PROT SERPL-MCNC: 8.8 G/DL (ref 6–8.5)
PROT UR QL STRIP: NEGATIVE
RBC # BLD AUTO: 4.07 10*6/MM3 (ref 4.14–5.8)
RBC # UR STRIP: ABNORMAL /HPF
REF LAB TEST METHOD: ABNORMAL
SODIUM SERPL-SCNC: 132 MMOL/L (ref 136–145)
SP GR UR STRIP: 1.01 (ref 1–1.03)
SQUAMOUS #/AREA URNS HPF: ABNORMAL /HPF
UROBILINOGEN UR QL STRIP: ABNORMAL
WBC # UR STRIP: ABNORMAL /HPF
WBC NRBC COR # BLD: 7.37 10*3/MM3 (ref 3.4–10.8)

## 2023-05-02 PROCEDURE — 80053 COMPREHEN METABOLIC PANEL: CPT | Performed by: EMERGENCY MEDICINE

## 2023-05-02 PROCEDURE — 81001 URINALYSIS AUTO W/SCOPE: CPT | Performed by: EMERGENCY MEDICINE

## 2023-05-02 PROCEDURE — 96375 TX/PRO/DX INJ NEW DRUG ADDON: CPT

## 2023-05-02 PROCEDURE — 25010000002 CEFTRIAXONE SODIUM-DEXTROSE 1-3.74 GM-%(50ML) RECONSTITUTED SOLUTION: Performed by: EMERGENCY MEDICINE

## 2023-05-02 PROCEDURE — 25010000002 MORPHINE PER 10 MG: Performed by: EMERGENCY MEDICINE

## 2023-05-02 PROCEDURE — 36415 COLL VENOUS BLD VENIPUNCTURE: CPT

## 2023-05-02 PROCEDURE — 85025 COMPLETE CBC W/AUTO DIFF WBC: CPT | Performed by: EMERGENCY MEDICINE

## 2023-05-02 PROCEDURE — 96365 THER/PROPH/DIAG IV INF INIT: CPT

## 2023-05-02 RX ORDER — NYSTATIN 100000 U/G
1 CREAM TOPICAL EVERY 12 HOURS SCHEDULED
Status: DISCONTINUED | OUTPATIENT
Start: 2023-05-02 | End: 2023-05-02 | Stop reason: HOSPADM

## 2023-05-02 RX ORDER — NYSTATIN 100000 [USP'U]/G
POWDER TOPICAL 3 TIMES DAILY
Qty: 60 G | Refills: 0 | Status: SHIPPED | OUTPATIENT
Start: 2023-05-02

## 2023-05-02 RX ORDER — CEFTRIAXONE 1 G/50ML
1 INJECTION, SOLUTION INTRAVENOUS ONCE
Status: COMPLETED | OUTPATIENT
Start: 2023-05-02 | End: 2023-05-02

## 2023-05-02 RX ORDER — CEPHALEXIN 500 MG/1
500 CAPSULE ORAL 3 TIMES DAILY
Qty: 21 CAPSULE | Refills: 0 | Status: SHIPPED | OUTPATIENT
Start: 2023-05-02 | End: 2023-05-10

## 2023-05-02 RX ADMIN — SODIUM CHLORIDE 1000 ML: 9 INJECTION, SOLUTION INTRAVENOUS at 03:23

## 2023-05-02 RX ADMIN — CEFTRIAXONE 1 G: 1 INJECTION, SOLUTION INTRAVENOUS at 02:24

## 2023-05-02 RX ADMIN — NYSTATIN 1 APPLICATION: 100000 CREAM TOPICAL at 02:25

## 2023-05-02 RX ADMIN — MORPHINE SULFATE 4 MG: 4 INJECTION, SOLUTION INTRAMUSCULAR; INTRAVENOUS at 02:24

## 2023-05-02 NOTE — ED PROVIDER NOTES
"  HPI: Dharmesh Samuel is a 52 y.o. male who presents to the emergency department complaining of buttocks pain and wound.  He states that for a couple weeks he has had some pain to his buttocks, he has tried multiple over-the-counter remedies including some sort of equine ointment.  He has no fevers, vomiting, abdominal pain or other complaints.  He is a \"borderline\" diabetic.      REVIEW OF SYSTEMS: All other systems reviewed and are negative     PAST MEDICAL HISTORY:   Past Medical History:   Diagnosis Date   • Arthritis    • Bulging lumbar disc    • Depression 2017   • Hypertension    • Low back pain    • Obesity 2020   • Pinched nerve    • Sleep apnea    • Visual impairment 2020        FAMILY HISTORY:   Family History   Problem Relation Age of Onset   • Hypertension Father    • Sleep apnea Paternal Uncle    • Alcohol abuse Maternal Grandfather    • Arthritis Maternal Grandmother    • Arthritis Paternal Grandmother         SOCIAL HISTORY:   Social History     Socioeconomic History   • Marital status:    Tobacco Use   • Smoking status: Every Day     Packs/day: 0.50     Years: 15.00     Pack years: 7.50     Types: Cigarettes     Passive exposure: Past   • Smokeless tobacco: Former   Vaping Use   • Vaping Use: Never used   Substance and Sexual Activity   • Alcohol use: Yes     Alcohol/week: 5.0 standard drinks     Types: 5 Drinks containing 0.5 oz of alcohol per week     Comment: 4 drinks a day   • Drug use: Never   • Sexual activity: Yes     Partners: Female     Birth control/protection: None        SURGICAL HISTORY:   History reviewed. No pertinent surgical history.     ALLERGIES: Patient has no known allergies.       PHYSICAL EXAM:   VITAL SIGNS:   Vitals:    05/02/23 0056   BP: 118/69   Pulse: 76   Resp: 18   Temp: 97.6 °F (36.4 °C)   SpO2: 98%      CONSTITUTIONAL: Awake, well appearing, nontoxic   HENT: Atraumatic, normocephalic, oral mucosa moist, airway patent. Nares patent without drainage. External ears " normal.   EYES: Conjunctivae clear, EOMI, PERRL   NECK: Trachea midline, nontender, supple   CARDIOVASCULAR: Normal heart rate, Normal rhythm.  PULMONARY/CHEST: Normal work of breathing. Clear to auscultation, no rhonchi, wheezes, or rales.  ABDOMINAL: Nondistended, soft, nontender, no rebound or guarding.  NEUROLOGIC: Nonfocal, moves all four extremities, no gross sensory or motor deficits.   EXTREMITIES: No clubbing, cyanosis, or edema   SKIN: Warm, Dry, there is excoriated skin to the bilateral buttocks, very erythematous with some ulcerative looking lesions, there is no drainage, no induration or fluctuance, no necrosis    ED COURSE / MEDICAL DECISION MAKING:     Dharmesh Samuel is a 52 y.o. male who presents to the emergency department for evaluation of buttocks wound and pain.  Well-developed, well-nourished obese man in no distress with exam as above.  His vital signs are normal.  His oxygen saturation is normal on room air 98%.  His exam is notable for some skin breakdown and redness to his buttocks.  No evidence of abscess or other concerning findings.  Will check basic labs, will cleanse area and put nystatin ointment.  We will also start course of antibiotics.  Disposition pending though anticipate discharge home.    Differential diagnosis includes Candida infection, cellulitis, skin breakdown from irritation among other etiologies.    Labs notable for worsening renal failure with fairly significant uremia.  Glucose and white blood cell count normal.  I discussed the case with Dr. Salas, he advised IV fluids and outpatient follow-up with Dr. Keller.  Patient does not want to stay in the hospital regardless.  Have advised close outpatient follow-up and return precautions.    Final diagnoses:   Renal failure, unspecified chronicity   Uremia   Skin breakdown        Kaiser Gregg MD  05/02/23 1949

## 2023-05-10 ENCOUNTER — OFFICE VISIT (OUTPATIENT)
Dept: FAMILY MEDICINE CLINIC | Facility: CLINIC | Age: 53
End: 2023-05-10
Payer: COMMERCIAL

## 2023-05-10 VITALS
SYSTOLIC BLOOD PRESSURE: 170 MMHG | HEART RATE: 83 BPM | BODY MASS INDEX: 39.58 KG/M2 | TEMPERATURE: 98.3 F | HEIGHT: 72 IN | OXYGEN SATURATION: 97 % | DIASTOLIC BLOOD PRESSURE: 105 MMHG | WEIGHT: 292.2 LBS

## 2023-05-10 DIAGNOSIS — M32.9 LUPUS: ICD-10-CM

## 2023-05-10 DIAGNOSIS — G89.29 CHRONIC BILATERAL LOW BACK PAIN WITH BILATERAL SCIATICA: ICD-10-CM

## 2023-05-10 DIAGNOSIS — M54.41 CHRONIC BILATERAL LOW BACK PAIN WITH BILATERAL SCIATICA: ICD-10-CM

## 2023-05-10 DIAGNOSIS — N18.2 CKD (CHRONIC KIDNEY DISEASE) STAGE 2, GFR 60-89 ML/MIN: ICD-10-CM

## 2023-05-10 DIAGNOSIS — R21 RASH: ICD-10-CM

## 2023-05-10 DIAGNOSIS — I10 ESSENTIAL HYPERTENSION: Primary | ICD-10-CM

## 2023-05-10 DIAGNOSIS — M54.42 CHRONIC BILATERAL LOW BACK PAIN WITH BILATERAL SCIATICA: ICD-10-CM

## 2023-05-10 DIAGNOSIS — F33.0 MILD EPISODE OF RECURRENT MAJOR DEPRESSIVE DISORDER: ICD-10-CM

## 2023-05-10 RX ORDER — TORSEMIDE 10 MG/1
10 TABLET ORAL DAILY
Qty: 30 TABLET | Refills: 2 | Status: SHIPPED | OUTPATIENT
Start: 2023-05-10

## 2023-05-10 RX ORDER — TRAMADOL HYDROCHLORIDE 50 MG/1
50 TABLET ORAL EVERY 12 HOURS PRN
Qty: 45 TABLET | Refills: 1 | Status: SHIPPED | OUTPATIENT
Start: 2023-05-10

## 2023-05-10 NOTE — PROGRESS NOTES
Subjective     Chief Complaint:    Chief Complaint   Patient presents with   • Rash     Pt sts he went to [facility] ER for rash on butt, sts it is very painful and hard to sit down.       History of Present Illness:   Raw rash on bottom, started after he had diarrhea, he was using rubbing alcholhol hand  to help clean himself. Was seen in ED, he took keflex and has been using some stuff from [employer], not sure if the rash is getting better or not  HTN, was having lows so he stopped his meds, was almost passing out, this started after he was started on high dose toresemide. He does have refractory HTN  Also with CKD, most recent TANVI, was given IVF in ED. Hospitalist recommended outpatient eval with renal, has appt on 5/22 (yolanda)  Lupus, managed by Jerry, stopped mtx and continues hydroxychlorquine  Depression on lexapro   Chronic low back pain, saw pain management but has not heard anything yet.  Taking gabapentin.  Cannot have NSAIDs due to CKD.  Request something for pain to take on his bad pain days      Review of Systems  Gen- No fevers, chills  CV- No chest pain, palpitations  Resp- No cough, dyspnea  GI- No N/V/D, abd pain  Neuro-No dizziness, headaches      I have reviewed and/or updated the patient's past medical, surgical, family, social history and problem list as appropriate.     Medications:    Current Outpatient Medications:   •  albuterol sulfate  (90 Base) MCG/ACT inhaler, Inhale 2 puffs Every 4 (Four) Hours As Needed for Wheezing or Shortness of Air (cough)., Disp: 8 g, Rfl: 0  •  carvedilol (COREG) 25 MG tablet, Take 1 tablet by mouth 2 (Two) Times a Day With Meals., Disp: 180 tablet, Rfl: 3  •  escitalopram (Lexapro) 10 MG tablet, Take 1 tablet by mouth Every Morning., Disp: 30 tablet, Rfl: 1  •  folic acid (FOLVITE) 1 MG tablet, Take 1 tablet by mouth Daily., Disp: 90 tablet, Rfl: 3  •  gabapentin (NEURONTIN) 600 MG tablet, Take 1 tablet by mouth 3 (Three) Times a Day., Disp:  "90 tablet, Rfl: 2  •  hydroxychloroquine (PLAQUENIL) 200 MG tablet, Daily., Disp: , Rfl:   •  nystatin (MYCOSTATIN) 056212 UNIT/GM powder, Apply  topically to the appropriate area as directed 3 (Three) Times a Day., Disp: 60 g, Rfl: 0  •  amLODIPine (NORVASC) 10 MG tablet, Take 1 tablet by mouth Daily. (Patient not taking: Reported on 5/10/2023), Disp: 90 tablet, Rfl: 1  •  lisinopril (PRINIVIL,ZESTRIL) 40 MG tablet, Take 1 tablet by mouth Daily. (Patient not taking: Reported on 5/10/2023), Disp: 90 tablet, Rfl: 3  •  terazosin (HYTRIN) 5 MG capsule, Take 1 capsule by mouth Every Night., Disp: 90 capsule, Rfl: 1  •  torsemide (DEMADEX) 10 MG tablet, Take 1 tablet by mouth Daily., Disp: 30 tablet, Rfl: 2  •  traMADol (ULTRAM) 50 MG tablet, Take 1 tablet by mouth Every 12 (Twelve) Hours As Needed for Moderate Pain., Disp: 45 tablet, Rfl: 1    Allergies:  No Known Allergies    Objective     Vital Signs:   Vitals:    05/10/23 1336   BP: (!) 170/105   Pulse: 83   Temp: 98.3 °F (36.8 °C)   SpO2: 97%   Weight: 133 kg (292 lb 3.2 oz)   Height: 182.9 cm (72\")   PainSc: 10-Worst pain ever     Body mass index is 39.63 kg/m².    Physical Exam:    Physical Exam  Vitals and nursing note reviewed. Exam conducted with a chaperone present (Madeleine Moreland).   Constitutional:       Appearance: He is well-developed.   HENT:      Head: Normocephalic and atraumatic.   Eyes:      Pupils: Pupils are equal, round, and reactive to light.   Cardiovascular:      Rate and Rhythm: Normal rate.   Pulmonary:      Effort: Pulmonary effort is normal.   Musculoskeletal:      Cervical back: Neck supple.   Skin:     General: Skin is warm and dry.      Findings: Rash (Erythematous excoriation rash noted on gluteus) present.   Neurological:      General: No focal deficit present.      Mental Status: He is alert and oriented to person, place, and time.   Psychiatric:         Mood and Affect: Mood normal.         Behavior: Behavior normal. "         Assessment / Plan     Assessment/Plan:   Problem List Items Addressed This Visit        Cardiac and Vasculature    Essential hypertension - Primary    Relevant Medications    amLODIPine (NORVASC) 10 MG tablet    carvedilol (COREG) 25 MG tablet    lisinopril (PRINIVIL,ZESTRIL) 40 MG tablet    terazosin (HYTRIN) 5 MG capsule    torsemide (DEMADEX) 10 MG tablet    Other Relevant Orders    Basic Metabolic Panel    CBC w AUTO Differential       Genitourinary and Reproductive     CKD (chronic kidney disease) stage 2, GFR 60-89 ml/min    Relevant Medications    torsemide (DEMADEX) 10 MG tablet       Mental Health    Mild episode of recurrent major depressive disorder    Relevant Medications    escitalopram (Lexapro) 10 MG tablet       Multi-system (Lupus, Sarcoid...)    Lupus   Other Visit Diagnoses     Chronic bilateral low back pain with bilateral sciatica        Relevant Medications    traMADol (ULTRAM) 50 MG tablet    Other Relevant Orders    XR Spine Lumbar Complete With Flex & Ext    Rash            --Repeat labs  --Blood pressure not controlled.  Strongly encouraged him to restart his blood pressure medicines.  His hypotension is a direct correlation to his large dose torsemide.  I have decreased this to 10 mg.  Keep follow-up with nephrology  --Mood stable  --Continue follow-up with rheumatology for management of lupus  --Pain management note reviewed.  Will check lumbar flexion-extension x-ray as recommended by Dr. Van.  Tramadol provided.  Continue gabapentin  --Concerning his rash I recommended Desitin.    Discussed plan of care in detail with pt today; pt verb understanding and agrees.    Follow up:  3 months    Electronically signed by AMY Escamilla   05/10/2023 13:46 EDT      Please note that portions of this note were completed with a voice recognition program.

## 2023-05-11 LAB
BASOPHILS # BLD AUTO: 0.07 10*3/MM3 (ref 0–0.2)
BASOPHILS NFR BLD AUTO: 1.2 % (ref 0–1.5)
BUN SERPL-MCNC: 30 MG/DL (ref 6–20)
BUN/CREAT SERPL: 17.5 (ref 7–25)
CALCIUM SERPL-MCNC: 10.3 MG/DL (ref 8.6–10.5)
CHLORIDE SERPL-SCNC: 99 MMOL/L (ref 98–107)
CO2 SERPL-SCNC: 29.1 MMOL/L (ref 22–29)
CREAT SERPL-MCNC: 1.71 MG/DL (ref 0.76–1.27)
EGFRCR SERPLBLD CKD-EPI 2021: 47.9 ML/MIN/1.73
EOSINOPHIL # BLD AUTO: 0.17 10*3/MM3 (ref 0–0.4)
EOSINOPHIL NFR BLD AUTO: 2.8 % (ref 0.3–6.2)
ERYTHROCYTE [DISTWIDTH] IN BLOOD BY AUTOMATED COUNT: 12.7 % (ref 12.3–15.4)
GLUCOSE SERPL-MCNC: 80 MG/DL (ref 65–99)
HCT VFR BLD AUTO: 39.6 % (ref 37.5–51)
HGB BLD-MCNC: 14.1 G/DL (ref 13–17.7)
IMM GRANULOCYTES # BLD AUTO: 0.03 10*3/MM3 (ref 0–0.05)
IMM GRANULOCYTES NFR BLD AUTO: 0.5 % (ref 0–0.5)
LYMPHOCYTES # BLD AUTO: 1.79 10*3/MM3 (ref 0.7–3.1)
LYMPHOCYTES NFR BLD AUTO: 29.6 % (ref 19.6–45.3)
MCH RBC QN AUTO: 35.7 PG (ref 26.6–33)
MCHC RBC AUTO-ENTMCNC: 35.6 G/DL (ref 31.5–35.7)
MCV RBC AUTO: 100.3 FL (ref 79–97)
MONOCYTES # BLD AUTO: 1.1 10*3/MM3 (ref 0.1–0.9)
MONOCYTES NFR BLD AUTO: 18.2 % (ref 5–12)
NEUTROPHILS # BLD AUTO: 2.89 10*3/MM3 (ref 1.7–7)
NEUTROPHILS NFR BLD AUTO: 47.7 % (ref 42.7–76)
NRBC BLD AUTO-RTO: 0.2 /100 WBC (ref 0–0.2)
PLATELET # BLD AUTO: 336 10*3/MM3 (ref 140–450)
POTASSIUM SERPL-SCNC: 4.1 MMOL/L (ref 3.5–5.2)
RBC # BLD AUTO: 3.95 10*6/MM3 (ref 4.14–5.8)
SODIUM SERPL-SCNC: 140 MMOL/L (ref 136–145)
WBC # BLD AUTO: 6.05 10*3/MM3 (ref 3.4–10.8)

## 2023-06-01 RX ORDER — ESCITALOPRAM OXALATE 10 MG/1
TABLET ORAL
Qty: 30 TABLET | Refills: 0 | Status: SHIPPED | OUTPATIENT
Start: 2023-06-01

## 2023-06-08 ENCOUNTER — OFFICE VISIT (OUTPATIENT)
Dept: CARDIOLOGY | Facility: CLINIC | Age: 53
End: 2023-06-08
Payer: COMMERCIAL

## 2023-06-08 VITALS
SYSTOLIC BLOOD PRESSURE: 122 MMHG | OXYGEN SATURATION: 97 % | WEIGHT: 299 LBS | HEIGHT: 72 IN | DIASTOLIC BLOOD PRESSURE: 72 MMHG | BODY MASS INDEX: 40.5 KG/M2 | HEART RATE: 62 BPM | TEMPERATURE: 97.8 F

## 2023-06-08 DIAGNOSIS — N18.2 CKD (CHRONIC KIDNEY DISEASE) STAGE 2, GFR 60-89 ML/MIN: ICD-10-CM

## 2023-06-08 DIAGNOSIS — E66.01 CLASS 3 SEVERE OBESITY DUE TO EXCESS CALORIES WITH SERIOUS COMORBIDITY AND BODY MASS INDEX (BMI) OF 40.0 TO 44.9 IN ADULT: ICD-10-CM

## 2023-06-08 DIAGNOSIS — I10 ESSENTIAL HYPERTENSION: Primary | ICD-10-CM

## 2023-06-08 RX ORDER — PREDNISONE 10 MG/1
TABLET ORAL
COMMUNITY
Start: 2023-05-31

## 2023-06-08 RX ORDER — CHLORTHALIDONE 25 MG/1
1 TABLET ORAL DAILY
COMMUNITY
Start: 2023-05-25

## 2023-06-08 NOTE — PROGRESS NOTES
[facility] Cardiology Office Follow Up Note    Dharmesh Samuel  [id number]  2023    Primary Care Provider: Michelle Sky APRN    Chief Complaint: Routine follow-up    History of Present Illness:   Mr. Dharmesh Samuel is a 52 y.o. male who presents to the Cardiology Clinic for routine follow-up.  The patient has a past medical history significant for hypertension, as well as obesity with a BMI 40.  He does not have any significant past cardiac history.    Patient is today for regular follow-up.  Since his last appointment, the patient reports his BP has been well controlled.  On intermittent home BP checks, systolic BP is typically in the 120s.  He does report an episode of hypotension when started on high-dose terazosin.  His hypotension resolved with down titration of the dose.  Currently, he remains active without chest pain or anginal symptoms.  No specific complaints today.    Past Cardiac Testin. Last Coronary Angio: None  2. Prior Stress Testing: None  3. Last Echo:               1.  Normal left ventricular size and systolic function, LVEF 60-65%.              2.  Mild concentric LVH.              3.  Normal LV diastolic filling pattern.              4.  Normal right ventricular size and systolic function.              5.  Normal left and right atrial size.              6.  No significant valvular abnormalities.  4. Prior Holter Monitor: None    Review of Systems:   Review of Systems   Constitutional:  Negative for activity change, appetite change, chills, diaphoresis, fatigue, fever, unexpected weight gain and unexpected weight loss.   Eyes:  Negative for blurred vision and double vision.   Respiratory:  Negative for cough, chest tightness, shortness of breath and wheezing.    Cardiovascular:  Negative for chest pain, palpitations and leg swelling.   Gastrointestinal:  Negative for abdominal pain, anal bleeding, blood in stool and GERD.   Endocrine: Negative for cold  intolerance and heat intolerance.   Genitourinary:  Negative for hematuria.   Neurological:  Negative for dizziness, syncope, weakness and light-headedness.   Hematological:  Does not bruise/bleed easily.   Psychiatric/Behavioral:  Negative for depressed mood and stress. The patient is not nervous/anxious.      I have reviewed and/or updated the patient's past medical, past surgical, family, social history, problem list and allergies as appropriate.     Medications:     Current Outpatient Medications:     albuterol sulfate  (90 Base) MCG/ACT inhaler, Inhale 2 puffs Every 4 (Four) Hours As Needed for Wheezing or Shortness of Air (cough)., Disp: 8 g, Rfl: 0    amLODIPine (NORVASC) 10 MG tablet, Take 1 tablet by mouth Daily., Disp: 90 tablet, Rfl: 1    carvedilol (COREG) 25 MG tablet, Take 1 tablet by mouth 2 (Two) Times a Day With Meals., Disp: 180 tablet, Rfl: 3    chlorthalidone (HYGROTON) 25 MG tablet, Take 1 tablet by mouth Daily., Disp: , Rfl:     escitalopram (LEXAPRO) 10 MG tablet, TAKE 1 TABLET BY MOUTH ONCE DAILY IN THE MORNING, Disp: 30 tablet, Rfl: 0    folic acid (FOLVITE) 1 MG tablet, Take 1 tablet by mouth Daily., Disp: 90 tablet, Rfl: 3    gabapentin (NEURONTIN) 600 MG tablet, Take 1 tablet by mouth 3 (Three) Times a Day., Disp: 90 tablet, Rfl: 2    hydroxychloroquine (PLAQUENIL) 200 MG tablet, Daily., Disp: , Rfl:     lisinopril (PRINIVIL,ZESTRIL) 40 MG tablet, Take 1 tablet by mouth Daily., Disp: 90 tablet, Rfl: 3    methotrexate 2.5 MG tablet, , Disp: , Rfl:     predniSONE (DELTASONE) 10 MG tablet, TAKE 1 TABLET BY MOUTH ONCE DAILY AS NEEDED FOR 2-5 DAYS FOR A FLARE UP. MAY REPEAT ONCE WEEKLY., Disp: , Rfl:     terazosin (HYTRIN) 5 MG capsule, Take 1 capsule by mouth Every Night., Disp: 90 capsule, Rfl: 1    torsemide (DEMADEX) 10 MG tablet, Take 1 tablet by mouth Daily., Disp: 30 tablet, Rfl: 2    traMADol (ULTRAM) 50 MG tablet, Take 1 tablet by mouth Every 12 (Twelve) Hours As Needed for  "Moderate Pain., Disp: 45 tablet, Rfl: 1    nystatin (MYCOSTATIN) 564529 UNIT/GM powder, Apply  topically to the appropriate area as directed 3 (Three) Times a Day. (Patient not taking: Reported on 6/8/2023), Disp: 60 g, Rfl: 0    Physical Exam:  Vital Signs:   Vitals:    06/08/23 0923   BP: 122/72   BP Location: Right arm   Patient Position: Sitting   Cuff Size: Adult   Pulse: 62   Temp: 97.8 °F (36.6 °C)   TempSrc: Temporal   SpO2: 97%   Weight: 136 kg (299 lb)   Height: 182.9 cm (72\")       Physical Exam  Constitutional:       General: He is not in acute distress.     Appearance: He is obese. He is not diaphoretic.   HENT:      Head: Normocephalic and atraumatic.   Cardiovascular:      Rate and Rhythm: Normal rate and regular rhythm.      Heart sounds: No murmur heard.  Pulmonary:      Effort: Pulmonary effort is normal. No respiratory distress.      Breath sounds: Normal breath sounds. No stridor. No wheezing, rhonchi or rales.   Abdominal:      General: Bowel sounds are normal. There is no distension.      Palpations: Abdomen is soft.      Tenderness: There is no abdominal tenderness. There is no guarding or rebound.   Musculoskeletal:         General: No swelling. Normal range of motion.      Cervical back: Neck supple. No tenderness.   Skin:     General: Skin is warm and dry.   Neurological:      General: No focal deficit present.      Mental Status: He is alert and oriented to person, place, and time.   Psychiatric:         Mood and Affect: Mood normal.         Behavior: Behavior normal.       Results Review:   I reviewed the patient's new clinical results.        Assessment / Plan:     1. Primary hypertension  -- BP now well controlled, systolic 120s today and on intermittent home BP checks  --Continue current medications  --Follow-up in 1 year, sooner if required     2.  Stage II CKD  -- Renal function stable with creatinine 1.71 on last assessment in 5/23     3.  Morbid obesity with BMI of 40.0-44.9  -- " Weight loss advised    Follow Up:   Return in about 1 year (around 6/8/2024).      Thank you for allowing me to participate in the care of your patient. Please to not hesitate to contact me with additional questions or concerns.     NEFTALI Mcgregor MD  Interventional Cardiology   06/08/2023  09:40 EDT

## 2023-06-28 PROBLEM — M06.9 RHEUMATOID ARTHRITIS INVOLVING MULTIPLE SITES: Status: ACTIVE | Noted: 2023-06-28

## 2023-07-24 RX ORDER — ESCITALOPRAM OXALATE 10 MG/1
TABLET ORAL
Qty: 30 TABLET | Refills: 0 | Status: SHIPPED | OUTPATIENT
Start: 2023-07-24

## 2023-08-02 DIAGNOSIS — I10 ESSENTIAL HYPERTENSION: ICD-10-CM

## 2023-08-02 RX ORDER — TORSEMIDE 10 MG/1
TABLET ORAL
Qty: 30 TABLET | Refills: 0 | Status: SHIPPED | OUTPATIENT
Start: 2023-08-02

## 2023-08-15 ENCOUNTER — OFFICE VISIT (OUTPATIENT)
Dept: FAMILY MEDICINE CLINIC | Facility: CLINIC | Age: 53
End: 2023-08-15
Payer: COMMERCIAL

## 2023-08-15 VITALS
OXYGEN SATURATION: 98 % | HEIGHT: 72 IN | BODY MASS INDEX: 40.9 KG/M2 | HEART RATE: 64 BPM | SYSTOLIC BLOOD PRESSURE: 144 MMHG | TEMPERATURE: 98.1 F | DIASTOLIC BLOOD PRESSURE: 92 MMHG | WEIGHT: 302 LBS

## 2023-08-15 DIAGNOSIS — M32.9 LUPUS: ICD-10-CM

## 2023-08-15 DIAGNOSIS — H04.123 DRY EYES: ICD-10-CM

## 2023-08-15 DIAGNOSIS — M06.9 RHEUMATOID ARTHRITIS INVOLVING MULTIPLE SITES, UNSPECIFIED WHETHER RHEUMATOID FACTOR PRESENT: ICD-10-CM

## 2023-08-15 DIAGNOSIS — G89.29 CHRONIC BILATERAL LOW BACK PAIN WITH LEFT-SIDED SCIATICA: ICD-10-CM

## 2023-08-15 DIAGNOSIS — I10 ESSENTIAL HYPERTENSION: ICD-10-CM

## 2023-08-15 DIAGNOSIS — E66.01 CLASS 3 SEVERE OBESITY DUE TO EXCESS CALORIES WITH SERIOUS COMORBIDITY AND BODY MASS INDEX (BMI) OF 40.0 TO 44.9 IN ADULT: ICD-10-CM

## 2023-08-15 DIAGNOSIS — N18.2 CKD (CHRONIC KIDNEY DISEASE) STAGE 2, GFR 60-89 ML/MIN: ICD-10-CM

## 2023-08-15 DIAGNOSIS — F33.0 MILD EPISODE OF RECURRENT MAJOR DEPRESSIVE DISORDER: Primary | ICD-10-CM

## 2023-08-15 DIAGNOSIS — M54.42 CHRONIC BILATERAL LOW BACK PAIN WITH LEFT-SIDED SCIATICA: ICD-10-CM

## 2023-08-15 DIAGNOSIS — Z12.11 SCREEN FOR COLON CANCER: ICD-10-CM

## 2023-08-15 DIAGNOSIS — R73.03 PRE-DIABETES: ICD-10-CM

## 2023-08-15 RX ORDER — ETANERCEPT 50 MG/ML
SOLUTION SUBCUTANEOUS
COMMUNITY
Start: 2023-07-27

## 2023-08-15 RX ORDER — ESCITALOPRAM OXALATE 20 MG/1
20 TABLET ORAL EVERY MORNING
Qty: 90 TABLET | Refills: 1 | Status: SHIPPED | OUTPATIENT
Start: 2023-08-15

## 2023-08-15 RX ORDER — CYCLOSPORINE 0.5 MG/ML
1 EMULSION OPHTHALMIC 2 TIMES DAILY
Qty: 10 EACH | Refills: 5 | Status: SHIPPED | OUTPATIENT
Start: 2023-08-15

## 2023-08-15 NOTE — PROGRESS NOTES
Subjective     Chief Complaint:    Chief Complaint   Patient presents with    Essential hypertension     Needs Lexapro 20mg called in       History of Present Illness:   HTN, Somewhat refractory, on coreg, norvasc, lisinopril, demedax,. Has not checked home BP in a few weeks but other readings have been normal  CKD, 3b (aktar)  Lupus and RA, managed by Jerry, continues mtx and plaquenil, now on enbril, still having a lot fo pain  Depression on lexapro, was suppose to be increased last month but was not, would like to increase  Chronic low back pain, chronic neck pain, chronic numbness and tingling of his hands bilaterally. Taking gabapentin and tramadol prn   Dry eyes, OTC treatments have not helped    Review of Systems  Gen- No fevers, chills  CV- No chest pain, palpitations  Resp- No cough, dyspnea  GI- No N/V/D, abd pain  Neuro-No dizziness, headaches      I have reviewed and/or updated the patient's past medical, surgical, family, social history and problem list as appropriate.     Medications:    Current Outpatient Medications:     albuterol sulfate  (90 Base) MCG/ACT inhaler, Inhale 2 puffs Every 4 (Four) Hours As Needed for Wheezing or Shortness of Air (cough)., Disp: 8 g, Rfl: 0    amLODIPine (NORVASC) 10 MG tablet, Take 1 tablet by mouth Daily., Disp: 90 tablet, Rfl: 1    carvedilol (COREG) 25 MG tablet, Take 1 tablet by mouth 2 (Two) Times a Day With Meals., Disp: 180 tablet, Rfl: 3    Enbrel Mini 50 MG/ML solution cartridge, , Disp: , Rfl:     escitalopram (LEXAPRO) 20 MG tablet, Take 1 tablet by mouth Every Morning., Disp: 90 tablet, Rfl: 1    folic acid (FOLVITE) 1 MG tablet, Take 1 tablet by mouth Daily., Disp: 90 tablet, Rfl: 3    gabapentin (NEURONTIN) 600 MG tablet, Take 1 tablet by mouth 3 (Three) Times a Day., Disp: 90 tablet, Rfl: 2    hydroxychloroquine (PLAQUENIL) 200 MG tablet, Daily., Disp: , Rfl:     lisinopril (PRINIVIL,ZESTRIL) 40 MG tablet, Take 1 tablet by mouth Daily.,  "Disp: 90 tablet, Rfl: 3    methotrexate 2.5 MG tablet, , Disp: , Rfl:     predniSONE (DELTASONE) 10 MG tablet, TAKE 1 TABLET BY MOUTH ONCE DAILY AS NEEDED FOR 2-5 DAYS FOR A FLARE UP. MAY REPEAT ONCE WEEKLY., Disp: , Rfl:     torsemide (DEMADEX) 10 MG tablet, Take 1 tablet by mouth once daily, Disp: 30 tablet, Rfl: 0    traMADol (ULTRAM) 50 MG tablet, Take 1 tablet by mouth Every 12 (Twelve) Hours As Needed for Moderate Pain., Disp: 45 tablet, Rfl: 1    cycloSPORINE (RESTASIS) 0.05 % ophthalmic emulsion, Administer 1 drop to both eyes 2 (Two) Times a Day., Disp: 10 each, Rfl: 5    Allergies:  No Known Allergies    Objective     Vital Signs:   Vitals:    08/15/23 1302   BP: 144/92   BP Location: Left arm   Patient Position: Sitting   Cuff Size: Adult   Pulse: 64   Temp: 98.1 øF (36.7 øC)   TempSrc: Temporal   SpO2: 98%   Weight: (!) 137 kg (302 lb)   Height: 182.9 cm (72\")  Comment: patient stated   PainSc:   8   PainLoc: Generalized     Body mass index is 40.96 kg/mý.    Physical Exam:    Physical Exam  Vitals and nursing note reviewed.   Constitutional:       Appearance: He is well-developed. He is obese.   HENT:      Head: Normocephalic and atraumatic.   Eyes:      Pupils: Pupils are equal, round, and reactive to light.   Cardiovascular:      Rate and Rhythm: Normal rate and regular rhythm.      Heart sounds: Normal heart sounds.   Pulmonary:      Effort: Pulmonary effort is normal.      Breath sounds: Normal breath sounds.   Abdominal:      General: Bowel sounds are normal. There is no distension.      Palpations: Abdomen is soft.      Tenderness: There is no abdominal tenderness.   Musculoskeletal:      Cervical back: Neck supple.   Skin:     General: Skin is warm and dry.   Neurological:      General: No focal deficit present.      Mental Status: He is alert and oriented to person, place, and time.   Psychiatric:         Mood and Affect: Mood normal.         Behavior: Behavior normal.       Assessment / Plan "     Assessment/Plan:   Problem List Items Addressed This Visit          Cardiac and Vasculature    Essential hypertension    Relevant Medications    amLODIPine (NORVASC) 10 MG tablet    carvedilol (COREG) 25 MG tablet    lisinopril (PRINIVIL,ZESTRIL) 40 MG tablet    torsemide (DEMADEX) 10 MG tablet       Endocrine and Metabolic    Class 3 severe obesity due to excess calories with serious comorbidity and body mass index (BMI) of 40.0 to 44.9 in adult    Pre-diabetes       Genitourinary and Reproductive     CKD (chronic kidney disease) stage 2, GFR 60-89 ml/min    Relevant Medications    torsemide (DEMADEX) 10 MG tablet       Mental Health    Mild episode of recurrent major depressive disorder - Primary    Relevant Medications    escitalopram (LEXAPRO) 20 MG tablet       Multi-system (Lupus, Sarcoid...)    Lupus    Relevant Medications    predniSONE (DELTASONE) 10 MG tablet    Enbrel Mini 50 MG/ML solution cartridge       Musculoskeletal and Injuries    Chronic bilateral low back pain with left-sided sciatica    Relevant Medications    gabapentin (NEURONTIN) 600 MG tablet    Rheumatoid arthritis involving multiple sites    Relevant Medications    predniSONE (DELTASONE) 10 MG tablet    Enbrel Mini 50 MG/ML solution cartridge     Other Visit Diagnoses       Screen for colon cancer        Relevant Orders    Cologuard - Stool, Per Rectum    Dry eyes        Relevant Medications    cycloSPORINE (RESTASIS) 0.05 % ophthalmic emulsion          -- keep f/u with rhem and CKD  -- increase lexapro  -- keep f/u with nephrology   -- BP controlled, no change        Discussed plan of care in detail with pt today; pt verb understanding and agrees.    Follow up:  3 months    Electronically signed by AMY Escamilla         Please note that portions of this note were completed with a voice recognition program.

## 2023-08-17 ENCOUNTER — PRIOR AUTHORIZATION (OUTPATIENT)
Dept: FAMILY MEDICINE CLINIC | Facility: CLINIC | Age: 53
End: 2023-08-17
Payer: COMMERCIAL

## 2023-08-17 NOTE — TELEPHONE ENCOUNTER
PRIOR AUTH HAS BEEN STARTED THROUGH [employer]OVER MY MED[employer] FOR RESTASIS.    APPROVED.    PHARMACY HAS BEEN NOTIFIED.    PATIENT HAS BEEN NOTIFIED.

## 2023-08-31 DIAGNOSIS — G89.29 CHRONIC BILATERAL LOW BACK PAIN WITH BILATERAL SCIATICA: ICD-10-CM

## 2023-08-31 DIAGNOSIS — M54.42 CHRONIC BILATERAL LOW BACK PAIN WITH BILATERAL SCIATICA: ICD-10-CM

## 2023-08-31 DIAGNOSIS — M54.41 CHRONIC BILATERAL LOW BACK PAIN WITH BILATERAL SCIATICA: ICD-10-CM

## 2023-08-31 NOTE — TELEPHONE ENCOUNTER
Rx Refill Note  Requested Prescriptions     Pending Prescriptions Disp Refills    traMADol (ULTRAM) 50 MG tablet [Pharmacy Med Name: traMADol HCl 50 MG Oral Tablet] 45 tablet 0     Sig: TAKE 1 TABLET BY MOUTH EVERY 12 HOURS AS NEEDED FOR MODERATE PAIN      Last office visit with prescribing clinician: 8/15/2023   Last telemedicine visit with prescribing clinician: Visit date not found   Next office visit with prescribing clinician: 11/15/2023                         Would you like a call back once the refill request has been completed: [] Yes [] No    If the office needs to give you a call back, can they leave a voicemail: [] Yes [] No    Lauren Quiroz MA  08/31/23, 12:57 EDT

## 2023-09-01 RX ORDER — TRAMADOL HYDROCHLORIDE 50 MG/1
TABLET ORAL
Qty: 45 TABLET | Refills: 2 | Status: SHIPPED | OUTPATIENT
Start: 2023-09-01

## 2023-09-02 DIAGNOSIS — I10 ESSENTIAL HYPERTENSION: ICD-10-CM

## 2023-09-05 RX ORDER — TORSEMIDE 10 MG/1
TABLET ORAL
Qty: 30 TABLET | Refills: 0 | Status: SHIPPED | OUTPATIENT
Start: 2023-09-05

## 2023-10-20 DIAGNOSIS — I10 ESSENTIAL HYPERTENSION: ICD-10-CM

## 2023-10-20 DIAGNOSIS — M54.42 CHRONIC BILATERAL LOW BACK PAIN WITH LEFT-SIDED SCIATICA: ICD-10-CM

## 2023-10-20 DIAGNOSIS — G89.29 CHRONIC BILATERAL LOW BACK PAIN WITH LEFT-SIDED SCIATICA: ICD-10-CM

## 2023-10-23 RX ORDER — TORSEMIDE 10 MG/1
TABLET ORAL
Qty: 30 TABLET | Refills: 0 | Status: SHIPPED | OUTPATIENT
Start: 2023-10-23

## 2023-10-23 RX ORDER — GABAPENTIN 600 MG/1
600 TABLET ORAL 3 TIMES DAILY
Qty: 90 TABLET | Refills: 1 | Status: SHIPPED | OUTPATIENT
Start: 2023-10-23

## 2023-10-23 NOTE — TELEPHONE ENCOUNTER
Rx Refill Note  Requested Prescriptions     Pending Prescriptions Disp Refills    gabapentin (NEURONTIN) 600 MG tablet [Pharmacy Med Name: Gabapentin 600 MG Oral Tablet] 90 tablet 0     Sig: TAKE 1 TABLET BY MOUTH THREE TIMES DAILY     Signed Prescriptions Disp Refills    torsemide (DEMADEX) 10 MG tablet 30 tablet 0     Sig: Take 1 tablet by mouth once daily     Authorizing Provider: ALEX BERRIOS     Ordering User: GLADYS QUINTERO      Last office visit with prescribing clinician: 8/15/2023   Last telemedicine visit with prescribing clinician: Visit date not found   Next office visit with prescribing clinician: 11/15/2023                         Would you like a call back once the refill request has been completed: [] Yes [] No    If the office needs to give you a call back, can they leave a voicemail: [] Yes [] No    Gladys Quintero MA  10/23/23, 12:13 EDT

## 2023-11-07 ENCOUNTER — APPOINTMENT (OUTPATIENT)
Dept: GENERAL RADIOLOGY | Facility: HOSPITAL | Age: 53
DRG: 190 | End: 2023-11-07
Payer: COMMERCIAL

## 2023-11-07 ENCOUNTER — HOSPITAL ENCOUNTER (INPATIENT)
Facility: HOSPITAL | Age: 53
LOS: 1 days | Discharge: HOME | DRG: 190 | End: 2023-11-09
Attending: EMERGENCY MEDICINE | Admitting: FAMILY MEDICINE
Payer: COMMERCIAL

## 2023-11-07 ENCOUNTER — APPOINTMENT (OUTPATIENT)
Dept: CT IMAGING | Facility: HOSPITAL | Age: 53
DRG: 190 | End: 2023-11-07
Payer: COMMERCIAL

## 2023-11-07 DIAGNOSIS — M06.9 RHEUMATOID ARTHRITIS INVOLVING MULTIPLE SITES, UNSPECIFIED WHETHER RHEUMATOID FACTOR PRESENT: ICD-10-CM

## 2023-11-07 DIAGNOSIS — F10.920 ALCOHOLIC INTOXICATION WITHOUT COMPLICATION: ICD-10-CM

## 2023-11-07 DIAGNOSIS — J96.01 ACUTE RESPIRATORY FAILURE WITH HYPOXIA AND HYPERCAPNIA: Primary | ICD-10-CM

## 2023-11-07 DIAGNOSIS — J44.1 COPD EXACERBATION: ICD-10-CM

## 2023-11-07 DIAGNOSIS — S09.90XA INJURY OF HEAD, INITIAL ENCOUNTER: ICD-10-CM

## 2023-11-07 DIAGNOSIS — J96.02 ACUTE RESPIRATORY FAILURE WITH HYPOXIA AND HYPERCAPNIA: Primary | ICD-10-CM

## 2023-11-07 LAB
ALBUMIN SERPL-MCNC: 4.1 G/DL (ref 3.5–5.2)
ALBUMIN/GLOB SERPL: 1.1 G/DL
ALP SERPL-CCNC: 71 U/L (ref 39–117)
ALT SERPL W P-5'-P-CCNC: 41 U/L (ref 1–41)
AMPHET+METHAMPHET UR QL: NEGATIVE
AMPHETAMINES UR QL: NEGATIVE
ANION GAP SERPL CALCULATED.3IONS-SCNC: 13 MMOL/L (ref 5–15)
AST SERPL-CCNC: 40 U/L (ref 1–40)
BARBITURATES UR QL SCN: NEGATIVE
BASOPHILS # BLD AUTO: 0.1 10*3/MM3 (ref 0–0.2)
BASOPHILS NFR BLD AUTO: 1 % (ref 0–1.5)
BENZODIAZ UR QL SCN: NEGATIVE
BILIRUB SERPL-MCNC: 0.3 MG/DL (ref 0–1.2)
BILIRUB UR QL STRIP: NEGATIVE
BUN SERPL-MCNC: 31 MG/DL (ref 8–23)
BUN/CREAT SERPL: 22.1 (ref 7–25)
BUPRENORPHINE SERPL-MCNC: POSITIVE NG/ML
CALCIUM SPEC-SCNC: 8.2 MG/DL (ref 8.2–9.6)
CANNABINOIDS SERPL QL: NEGATIVE
CHLORIDE SERPL-SCNC: 98 MMOL/L (ref 98–107)
CLARITY UR: CLEAR
CO2 SERPL-SCNC: 23 MMOL/L (ref 22–29)
COCAINE UR QL: NEGATIVE
COLOR UR: YELLOW
CREAT SERPL-MCNC: 1.4 MG/DL (ref 0.76–1.27)
DEPRECATED RDW RBC AUTO: 51.2 FL (ref 37–54)
EGFRCR SERPLBLD CKD-EPI 2021: 26.9 ML/MIN/1.73
EOSINOPHIL # BLD AUTO: 0.36 10*3/MM3 (ref 0–0.4)
EOSINOPHIL NFR BLD AUTO: 3.7 % (ref 0.3–6.2)
ERYTHROCYTE [DISTWIDTH] IN BLOOD BY AUTOMATED COUNT: 13.4 % (ref 12.3–15.4)
ETHANOL BLD-MCNC: 236 MG/DL (ref 0–10)
ETHANOL UR QL: 0.24 %
FENTANYL UR-MCNC: NEGATIVE NG/ML
GLOBULIN UR ELPH-MCNC: 3.6 GM/DL
GLUCOSE SERPL-MCNC: 115 MG/DL (ref 65–99)
GLUCOSE UR STRIP-MCNC: NEGATIVE MG/DL
HCT VFR BLD AUTO: 40.5 % (ref 37.5–51)
HGB BLD-MCNC: 13.9 G/DL (ref 13–17.7)
HGB UR QL STRIP.AUTO: NEGATIVE
IMM GRANULOCYTES # BLD AUTO: 0.06 10*3/MM3 (ref 0–0.05)
IMM GRANULOCYTES NFR BLD AUTO: 0.6 % (ref 0–0.5)
KETONES UR QL STRIP: NEGATIVE
LEUKOCYTE ESTERASE UR QL STRIP.AUTO: NEGATIVE
LYMPHOCYTES # BLD AUTO: 3.67 10*3/MM3 (ref 0.7–3.1)
LYMPHOCYTES NFR BLD AUTO: 38 % (ref 19.6–45.3)
MAGNESIUM SERPL-MCNC: 1.8 MG/DL (ref 1.7–2.3)
MCH RBC QN AUTO: 36 PG (ref 26.6–33)
MCHC RBC AUTO-ENTMCNC: 34.3 G/DL (ref 31.5–35.7)
MCV RBC AUTO: 104.9 FL (ref 79–97)
METHADONE UR QL SCN: NEGATIVE
MONOCYTES # BLD AUTO: 1.04 10*3/MM3 (ref 0.1–0.9)
MONOCYTES NFR BLD AUTO: 10.8 % (ref 5–12)
NEUTROPHILS NFR BLD AUTO: 4.42 10*3/MM3 (ref 1.7–7)
NEUTROPHILS NFR BLD AUTO: 45.9 % (ref 42.7–76)
NITRITE UR QL STRIP: NEGATIVE
NRBC BLD AUTO-RTO: 0.2 /100 WBC (ref 0–0.2)
NT-PROBNP SERPL-MCNC: 100.9 PG/ML (ref 0–1800)
OPIATES UR QL: NEGATIVE
OXYCODONE UR QL SCN: NEGATIVE
PCP UR QL SCN: NEGATIVE
PH UR STRIP.AUTO: 5.5 [PH] (ref 5–8)
PLATELET # BLD AUTO: 271 10*3/MM3 (ref 140–450)
PMV BLD AUTO: 9 FL (ref 6–12)
POTASSIUM SERPL-SCNC: 3.7 MMOL/L (ref 3.5–5.2)
PROCALCITONIN SERPL-MCNC: 0.09 NG/ML (ref 0–0.25)
PROT SERPL-MCNC: 7.7 G/DL (ref 6–8.5)
PROT UR QL STRIP: NEGATIVE
RBC # BLD AUTO: 3.86 10*6/MM3 (ref 4.14–5.8)
SODIUM SERPL-SCNC: 134 MMOL/L (ref 136–145)
SP GR UR STRIP: 1.01 (ref 1–1.03)
TRICYCLICS UR QL SCN: NEGATIVE
TROPONIN T SERPL HS-MCNC: 36 NG/L
UROBILINOGEN UR QL STRIP: NORMAL
WBC NRBC COR # BLD: 9.65 10*3/MM3 (ref 3.4–10.8)

## 2023-11-07 PROCEDURE — P9612 CATHETERIZE FOR URINE SPEC: HCPCS

## 2023-11-07 PROCEDURE — 80053 COMPREHEN METABOLIC PANEL: CPT | Performed by: EMERGENCY MEDICINE

## 2023-11-07 PROCEDURE — 36415 COLL VENOUS BLD VENIPUNCTURE: CPT

## 2023-11-07 PROCEDURE — 83735 ASSAY OF MAGNESIUM: CPT | Performed by: EMERGENCY MEDICINE

## 2023-11-07 PROCEDURE — 70450 CT HEAD/BRAIN W/O DYE: CPT

## 2023-11-07 PROCEDURE — 80307 DRUG TEST PRSMV CHEM ANLYZR: CPT | Performed by: EMERGENCY MEDICINE

## 2023-11-07 PROCEDURE — 83880 ASSAY OF NATRIURETIC PEPTIDE: CPT | Performed by: EMERGENCY MEDICINE

## 2023-11-07 PROCEDURE — 71045 X-RAY EXAM CHEST 1 VIEW: CPT

## 2023-11-07 PROCEDURE — 99285 EMERGENCY DEPT VISIT HI MDM: CPT

## 2023-11-07 PROCEDURE — 84145 PROCALCITONIN (PCT): CPT | Performed by: EMERGENCY MEDICINE

## 2023-11-07 PROCEDURE — 81003 URINALYSIS AUTO W/O SCOPE: CPT | Performed by: EMERGENCY MEDICINE

## 2023-11-07 PROCEDURE — 72125 CT NECK SPINE W/O DYE: CPT

## 2023-11-07 PROCEDURE — 93005 ELECTROCARDIOGRAM TRACING: CPT | Performed by: EMERGENCY MEDICINE

## 2023-11-07 PROCEDURE — 85025 COMPLETE CBC W/AUTO DIFF WBC: CPT | Performed by: EMERGENCY MEDICINE

## 2023-11-07 PROCEDURE — 82077 ASSAY SPEC XCP UR&BREATH IA: CPT | Performed by: EMERGENCY MEDICINE

## 2023-11-07 PROCEDURE — 84484 ASSAY OF TROPONIN QUANT: CPT | Performed by: EMERGENCY MEDICINE

## 2023-11-08 ENCOUNTER — APPOINTMENT (OUTPATIENT)
Dept: CARDIOLOGY | Facility: HOSPITAL | Age: 53
DRG: 190 | End: 2023-11-08
Payer: COMMERCIAL

## 2023-11-08 PROBLEM — J96.01 ACUTE RESPIRATORY FAILURE WITH HYPOXIA AND HYPERCAPNIA: Status: ACTIVE | Noted: 2023-11-08

## 2023-11-08 PROBLEM — J44.1 COPD EXACERBATION: Status: ACTIVE | Noted: 2023-11-08

## 2023-11-08 PROBLEM — J96.02 ACUTE RESPIRATORY FAILURE WITH HYPOXIA AND HYPERCAPNIA: Status: ACTIVE | Noted: 2023-11-08

## 2023-11-08 PROBLEM — J96.01 ACUTE RESPIRATORY FAILURE WITH HYPOXIA: Status: ACTIVE | Noted: 2023-11-08

## 2023-11-08 LAB
A-A DO2: 115.8 MMHG
A-A DO2: 126.5 MMHG
ARTERIAL PATENCY WRIST A: ABNORMAL
ARTERIAL PATENCY WRIST A: POSITIVE
ATMOSPHERIC PRESS: 733 MMHG
ATMOSPHERIC PRESS: 734 MMHG
BASE EXCESS BLDA CALC-SCNC: -4.1 MMOL/L (ref 0–2)
BASE EXCESS BLDA CALC-SCNC: -4.6 MMOL/L (ref 0–2)
BDY SITE: ABNORMAL
BDY SITE: ABNORMAL
BH CV ECHO MEAS - AO MAX PG: 11.4 MMHG
BH CV ECHO MEAS - AO MEAN PG: 7 MMHG
BH CV ECHO MEAS - AO ROOT DIAM: 3.8 CM
BH CV ECHO MEAS - AO V2 MAX: 169 CM/SEC
BH CV ECHO MEAS - AO V2 VTI: 37.3 CM
BH CV ECHO MEAS - AVA(I,D): 3.7 CM2
BH CV ECHO MEAS - EDV(CUBED): 118.4 ML
BH CV ECHO MEAS - EDV(MOD-SP2): 35.9 ML
BH CV ECHO MEAS - EDV(MOD-SP4): 53.6 ML
BH CV ECHO MEAS - EF(MOD-BP): 76.5 %
BH CV ECHO MEAS - EF(MOD-SP2): 80.4 %
BH CV ECHO MEAS - EF(MOD-SP4): 74.4 %
BH CV ECHO MEAS - ESV(CUBED): 24.1 ML
BH CV ECHO MEAS - ESV(MOD-SP2): 7 ML
BH CV ECHO MEAS - ESV(MOD-SP4): 13.7 ML
BH CV ECHO MEAS - FS: 41.1 %
BH CV ECHO MEAS - IVS/LVPW: 1.02 CM
BH CV ECHO MEAS - IVSD: 1.27 CM
BH CV ECHO MEAS - LA DIMENSION: 3.5 CM
BH CV ECHO MEAS - LAT PEAK E' VEL: 11.2 CM/SEC
BH CV ECHO MEAS - LV DIASTOLIC VOL/BSA (35-75): 21.4 CM2
BH CV ECHO MEAS - LV MASS(C)D: 242 GRAMS
BH CV ECHO MEAS - LV MAX PG: 8.4 MMHG
BH CV ECHO MEAS - LV MEAN PG: 4 MMHG
BH CV ECHO MEAS - LV SYSTOLIC VOL/BSA (12-30): 5.5 CM2
BH CV ECHO MEAS - LV V1 MAX: 145 CM/SEC
BH CV ECHO MEAS - LV V1 VTI: 33.2 CM
BH CV ECHO MEAS - LVIDD: 4.9 CM
BH CV ECHO MEAS - LVIDS: 2.9 CM
BH CV ECHO MEAS - LVOT AREA: 4.2 CM2
BH CV ECHO MEAS - LVOT DIAM: 2.31 CM
BH CV ECHO MEAS - LVPWD: 1.24 CM
BH CV ECHO MEAS - MED PEAK E' VEL: 8.6 CM/SEC
BH CV ECHO MEAS - MV A MAX VEL: 59.6 CM/SEC
BH CV ECHO MEAS - MV DEC SLOPE: 668 CM/SEC2
BH CV ECHO MEAS - MV DEC TIME: 0.18 SEC
BH CV ECHO MEAS - MV E MAX VEL: 118 CM/SEC
BH CV ECHO MEAS - MV E/A: 1.98
BH CV ECHO MEAS - MV MAX PG: 4.8 MMHG
BH CV ECHO MEAS - MV MEAN PG: 2 MMHG
BH CV ECHO MEAS - MV V2 VTI: 32.8 CM
BH CV ECHO MEAS - MVA(VTI): 4.2 CM2
BH CV ECHO MEAS - PA ACC TIME: 0.13 SEC
BH CV ECHO MEAS - PA V2 MAX: 123 CM/SEC
BH CV ECHO MEAS - RV MAX PG: 5.7 MMHG
BH CV ECHO MEAS - RV V1 MAX: 119 CM/SEC
BH CV ECHO MEAS - RV V1 VTI: 28.2 CM
BH CV ECHO MEAS - SI(MOD-SP2): 11.5 ML/M2
BH CV ECHO MEAS - SI(MOD-SP4): 15.9 ML/M2
BH CV ECHO MEAS - SV(LVOT): 139.1 ML
BH CV ECHO MEAS - SV(MOD-SP2): 28.9 ML
BH CV ECHO MEAS - SV(MOD-SP4): 39.9 ML
BH CV ECHO MEAS - TAPSE (>1.6): 3.8 CM
BH CV ECHO MEASUREMENTS AVERAGE E/E' RATIO: 11.92
COHGB MFR BLD: 5.6 % (ref 0–2)
COHGB MFR BLD: 6.7 % (ref 0–2)
EPAP: 8
GEN 5 2HR TROPONIN T REFLEX: 54 NG/L
GLUCOSE BLDC GLUCOMTR-MCNC: 126 MG/DL (ref 70–130)
HCO3 BLDA-SCNC: 23 MMOL/L (ref 22–28)
HCO3 BLDA-SCNC: 25.1 MMOL/L (ref 22–28)
HCT VFR BLD CALC: 37.2 %
HCT VFR BLD CALC: 44.9 %
INHALED O2 CONCENTRATION: 32 %
INHALED O2 CONCENTRATION: 40 %
IPAP: 16
LV EF 2D ECHO EST: 77 %
Lab: ABNORMAL
METHGB BLD QL: 0.2 % (ref 0–1.5)
METHGB BLD QL: 0.4 % (ref 0–1.5)
MODALITY: ABNORMAL
MODALITY: ABNORMAL
NOTIFIED BY: ABNORMAL
NOTIFIED WHO: ABNORMAL
OXYHGB MFR BLDV: 75.8 % (ref 94–99)
OXYHGB MFR BLDV: 87.6 % (ref 94–99)
PCO2 BLDA: 52.1 MM HG (ref 35–45)
PCO2 BLDA: 62.8 MM HG (ref 35–45)
PCO2 TEMP ADJ BLD: ABNORMAL MM[HG]
PCO2 TEMP ADJ BLD: ABNORMAL MM[HG]
PH BLDA: 7.21 PH UNITS (ref 7.35–7.45)
PH BLDA: 7.25 PH UNITS (ref 7.35–7.45)
PH, TEMP CORRECTED: ABNORMAL
PH, TEMP CORRECTED: ABNORMAL
PO2 BLDA: 45.9 MM HG (ref 75–100)
PO2 BLDA: 79.1 MM HG (ref 75–100)
PO2 TEMP ADJ BLD: ABNORMAL MM[HG]
PO2 TEMP ADJ BLD: ABNORMAL MM[HG]
QT INTERVAL: 374 MS
QTC INTERVAL: 450 MS
SAO2 % BLDCOA: 80.6 % (ref 94–100)
SAO2 % BLDCOA: 94.2 % (ref 94–100)
TROPONIN T DELTA: 18 NG/L
TROPONIN T SERPL HS-MCNC: 47 NG/L
VENTILATOR MODE: ABNORMAL
VENTILATOR MODE: ABNORMAL

## 2023-11-08 PROCEDURE — 97162 PT EVAL MOD COMPLEX 30 MIN: CPT

## 2023-11-08 PROCEDURE — 94799 UNLISTED PULMONARY SVC/PX: CPT

## 2023-11-08 PROCEDURE — 25010000002 SULFUR HEXAFLUORIDE MICROSPH 60.7-25 MG RECONSTITUTED SUSPENSION: Performed by: FAMILY MEDICINE

## 2023-11-08 PROCEDURE — 25010000002 HEPARIN (PORCINE) PER 1000 UNITS: Performed by: FAMILY MEDICINE

## 2023-11-08 PROCEDURE — 84484 ASSAY OF TROPONIN QUANT: CPT | Performed by: EMERGENCY MEDICINE

## 2023-11-08 PROCEDURE — 94640 AIRWAY INHALATION TREATMENT: CPT

## 2023-11-08 PROCEDURE — 63710000001 PREDNISONE PER 1 MG: Performed by: FAMILY MEDICINE

## 2023-11-08 PROCEDURE — 93306 TTE W/DOPPLER COMPLETE: CPT

## 2023-11-08 PROCEDURE — 97166 OT EVAL MOD COMPLEX 45 MIN: CPT

## 2023-11-08 PROCEDURE — 82948 REAGENT STRIP/BLOOD GLUCOSE: CPT

## 2023-11-08 PROCEDURE — 82805 BLOOD GASES W/O2 SATURATION: CPT

## 2023-11-08 PROCEDURE — 94761 N-INVAS EAR/PLS OXIMETRY MLT: CPT

## 2023-11-08 PROCEDURE — 82375 ASSAY CARBOXYHB QUANT: CPT

## 2023-11-08 PROCEDURE — 25010000002 METHYLPREDNISOLONE PER 125 MG: Performed by: EMERGENCY MEDICINE

## 2023-11-08 PROCEDURE — 93005 ELECTROCARDIOGRAM TRACING: CPT | Performed by: FAMILY MEDICINE

## 2023-11-08 PROCEDURE — 93306 TTE W/DOPPLER COMPLETE: CPT | Performed by: INTERNAL MEDICINE

## 2023-11-08 PROCEDURE — 83050 HGB METHEMOGLOBIN QUAN: CPT

## 2023-11-08 PROCEDURE — 36600 WITHDRAWAL OF ARTERIAL BLOOD: CPT

## 2023-11-08 RX ORDER — SODIUM CHLORIDE 9 MG/ML
40 INJECTION, SOLUTION INTRAVENOUS AS NEEDED
Status: DISCONTINUED | OUTPATIENT
Start: 2023-11-08 | End: 2023-11-09 | Stop reason: HOSPADM

## 2023-11-08 RX ORDER — BUDESONIDE AND FORMOTEROL FUMARATE DIHYDRATE 160; 4.5 UG/1; UG/1
2 AEROSOL RESPIRATORY (INHALATION)
Status: DISCONTINUED | OUTPATIENT
Start: 2023-11-08 | End: 2023-11-09

## 2023-11-08 RX ORDER — SODIUM CHLORIDE 0.9 % (FLUSH) 0.9 %
10 SYRINGE (ML) INJECTION AS NEEDED
Status: DISCONTINUED | OUTPATIENT
Start: 2023-11-08 | End: 2023-11-09 | Stop reason: HOSPADM

## 2023-11-08 RX ORDER — ALUMINA, MAGNESIA, AND SIMETHICONE 2400; 2400; 240 MG/30ML; MG/30ML; MG/30ML
15 SUSPENSION ORAL EVERY 6 HOURS PRN
Status: DISCONTINUED | OUTPATIENT
Start: 2023-11-08 | End: 2023-11-09 | Stop reason: HOSPADM

## 2023-11-08 RX ORDER — IPRATROPIUM BROMIDE AND ALBUTEROL SULFATE 2.5; .5 MG/3ML; MG/3ML
3 SOLUTION RESPIRATORY (INHALATION)
Status: DISCONTINUED | OUTPATIENT
Start: 2023-11-08 | End: 2023-11-09

## 2023-11-08 RX ORDER — ESCITALOPRAM OXALATE 20 MG/1
20 TABLET ORAL EVERY MORNING
COMMUNITY
Start: 2023-11-02

## 2023-11-08 RX ORDER — HEPARIN SODIUM 5000 [USP'U]/ML
5000 INJECTION, SOLUTION INTRAVENOUS; SUBCUTANEOUS EVERY 12 HOURS SCHEDULED
Status: DISCONTINUED | OUTPATIENT
Start: 2023-11-08 | End: 2023-11-09 | Stop reason: HOSPADM

## 2023-11-08 RX ORDER — FOLIC ACID 1 MG/1
1 TABLET ORAL DAILY
COMMUNITY
Start: 2023-10-20

## 2023-11-08 RX ORDER — METHOTREXATE 2.5 MG/1
15 TABLET ORAL WEEKLY
COMMUNITY
Start: 2023-10-21

## 2023-11-08 RX ORDER — FOLIC ACID 1 MG/1
1 TABLET ORAL DAILY
Status: DISCONTINUED | OUTPATIENT
Start: 2023-11-08 | End: 2023-11-09 | Stop reason: HOSPADM

## 2023-11-08 RX ORDER — BISACODYL 10 MG
10 SUPPOSITORY, RECTAL RECTAL DAILY PRN
Status: DISCONTINUED | OUTPATIENT
Start: 2023-11-08 | End: 2023-11-09 | Stop reason: HOSPADM

## 2023-11-08 RX ORDER — ACETAMINOPHEN 325 MG/1
650 TABLET ORAL EVERY 6 HOURS PRN
Status: DISCONTINUED | OUTPATIENT
Start: 2023-11-08 | End: 2023-11-09 | Stop reason: HOSPADM

## 2023-11-08 RX ORDER — GABAPENTIN 600 MG/1
600 TABLET ORAL 3 TIMES DAILY
COMMUNITY
Start: 2023-10-23

## 2023-11-08 RX ORDER — TORSEMIDE 10 MG/1
10 TABLET ORAL DAILY
COMMUNITY
Start: 2023-10-23

## 2023-11-08 RX ORDER — NICOTINE 21 MG/24HR
1 PATCH, TRANSDERMAL 24 HOURS TRANSDERMAL EVERY 24 HOURS
Status: DISCONTINUED | OUTPATIENT
Start: 2023-11-08 | End: 2023-11-09 | Stop reason: HOSPADM

## 2023-11-08 RX ORDER — LISINOPRIL 20 MG/1
40 TABLET ORAL DAILY
Status: DISCONTINUED | OUTPATIENT
Start: 2023-11-08 | End: 2023-11-09 | Stop reason: HOSPADM

## 2023-11-08 RX ORDER — SODIUM CHLORIDE 0.9 % (FLUSH) 0.9 %
10 SYRINGE (ML) INJECTION EVERY 12 HOURS SCHEDULED
Status: DISCONTINUED | OUTPATIENT
Start: 2023-11-08 | End: 2023-11-09 | Stop reason: HOSPADM

## 2023-11-08 RX ORDER — METHYLPREDNISOLONE SODIUM SUCCINATE 125 MG/2ML
125 INJECTION, POWDER, LYOPHILIZED, FOR SOLUTION INTRAMUSCULAR; INTRAVENOUS ONCE
Status: COMPLETED | OUTPATIENT
Start: 2023-11-08 | End: 2023-11-08

## 2023-11-08 RX ORDER — ESCITALOPRAM OXALATE 10 MG/1
10 TABLET ORAL EVERY MORNING
Status: DISCONTINUED | OUTPATIENT
Start: 2023-11-09 | End: 2023-11-09 | Stop reason: HOSPADM

## 2023-11-08 RX ORDER — CARVEDILOL 25 MG/1
25 TABLET ORAL 2 TIMES DAILY WITH MEALS
COMMUNITY
Start: 2023-11-02

## 2023-11-08 RX ORDER — GABAPENTIN 300 MG/1
600 CAPSULE ORAL EVERY 8 HOURS SCHEDULED
Status: DISCONTINUED | OUTPATIENT
Start: 2023-11-08 | End: 2023-11-09 | Stop reason: HOSPADM

## 2023-11-08 RX ORDER — TRAMADOL HYDROCHLORIDE 50 MG/1
50 TABLET ORAL EVERY 12 HOURS PRN
Status: DISCONTINUED | OUTPATIENT
Start: 2023-11-08 | End: 2023-11-09 | Stop reason: HOSPADM

## 2023-11-08 RX ORDER — BISACODYL 5 MG/1
5 TABLET, DELAYED RELEASE ORAL DAILY PRN
Status: DISCONTINUED | OUTPATIENT
Start: 2023-11-08 | End: 2023-11-09 | Stop reason: HOSPADM

## 2023-11-08 RX ORDER — AMLODIPINE BESYLATE 5 MG/1
10 TABLET ORAL NIGHTLY
Status: DISCONTINUED | OUTPATIENT
Start: 2023-11-08 | End: 2023-11-09 | Stop reason: HOSPADM

## 2023-11-08 RX ORDER — CHLORTHALIDONE 25 MG/1
1 TABLET ORAL DAILY
COMMUNITY
Start: 2023-07-22

## 2023-11-08 RX ORDER — LISINOPRIL 40 MG/1
40 TABLET ORAL DAILY
COMMUNITY
Start: 2023-07-22

## 2023-11-08 RX ORDER — LORAZEPAM 2 MG/1
2 TABLET ORAL
Status: DISCONTINUED | OUTPATIENT
Start: 2023-11-08 | End: 2023-11-09 | Stop reason: HOSPADM

## 2023-11-08 RX ORDER — AMOXICILLIN 250 MG
2 CAPSULE ORAL 2 TIMES DAILY
Status: DISCONTINUED | OUTPATIENT
Start: 2023-11-08 | End: 2023-11-09 | Stop reason: HOSPADM

## 2023-11-08 RX ORDER — NITROGLYCERIN 0.4 MG/1
0.4 TABLET SUBLINGUAL
Status: DISCONTINUED | OUTPATIENT
Start: 2023-11-08 | End: 2023-11-09 | Stop reason: HOSPADM

## 2023-11-08 RX ORDER — TRAMADOL HYDROCHLORIDE 50 MG/1
50 TABLET ORAL EVERY 12 HOURS PRN
COMMUNITY
Start: 2023-09-01

## 2023-11-08 RX ORDER — AMLODIPINE BESYLATE 10 MG/1
10 TABLET ORAL DAILY
COMMUNITY
Start: 2023-09-15

## 2023-11-08 RX ORDER — SODIUM CHLORIDE 9 MG/ML
50 INJECTION, SOLUTION INTRAVENOUS CONTINUOUS
Status: DISCONTINUED | OUTPATIENT
Start: 2023-11-08 | End: 2023-11-09 | Stop reason: HOSPADM

## 2023-11-08 RX ORDER — GUAIFENESIN/DEXTROMETHORPHAN 100-10MG/5
10 SYRUP ORAL EVERY 6 HOURS PRN
Status: DISCONTINUED | OUTPATIENT
Start: 2023-11-08 | End: 2023-11-09 | Stop reason: HOSPADM

## 2023-11-08 RX ORDER — IPRATROPIUM BROMIDE AND ALBUTEROL SULFATE 2.5; .5 MG/3ML; MG/3ML
3 SOLUTION RESPIRATORY (INHALATION) ONCE
Status: COMPLETED | OUTPATIENT
Start: 2023-11-08 | End: 2023-11-08

## 2023-11-08 RX ORDER — HYDROXYCHLOROQUINE SULFATE 200 MG/1
200 TABLET, FILM COATED ORAL DAILY
COMMUNITY
Start: 2023-10-20

## 2023-11-08 RX ORDER — ONDANSETRON 4 MG/1
4 TABLET, FILM COATED ORAL EVERY 6 HOURS PRN
Status: DISCONTINUED | OUTPATIENT
Start: 2023-11-08 | End: 2023-11-09 | Stop reason: HOSPADM

## 2023-11-08 RX ORDER — ONDANSETRON 2 MG/ML
4 INJECTION INTRAMUSCULAR; INTRAVENOUS EVERY 6 HOURS PRN
Status: DISCONTINUED | OUTPATIENT
Start: 2023-11-08 | End: 2023-11-09 | Stop reason: HOSPADM

## 2023-11-08 RX ORDER — LORAZEPAM 0.5 MG/1
1 TABLET ORAL
Status: DISCONTINUED | OUTPATIENT
Start: 2023-11-08 | End: 2023-11-09 | Stop reason: HOSPADM

## 2023-11-08 RX ORDER — POLYETHYLENE GLYCOL 3350 17 G/17G
17 POWDER, FOR SOLUTION ORAL DAILY PRN
Status: DISCONTINUED | OUTPATIENT
Start: 2023-11-08 | End: 2023-11-09 | Stop reason: HOSPADM

## 2023-11-08 RX ORDER — CHOLECALCIFEROL (VITAMIN D3) 125 MCG
5 CAPSULE ORAL NIGHTLY PRN
Status: DISCONTINUED | OUTPATIENT
Start: 2023-11-08 | End: 2023-11-09 | Stop reason: HOSPADM

## 2023-11-08 RX ORDER — CARVEDILOL 25 MG/1
25 TABLET ORAL 2 TIMES DAILY WITH MEALS
Status: DISCONTINUED | OUTPATIENT
Start: 2023-11-08 | End: 2023-11-09 | Stop reason: HOSPADM

## 2023-11-08 RX ORDER — ESCITALOPRAM OXALATE 10 MG/1
10 TABLET ORAL EVERY MORNING
COMMUNITY
Start: 2023-08-01 | End: 2023-11-09 | Stop reason: HOSPADM

## 2023-11-08 RX ORDER — PREDNISONE 20 MG/1
40 TABLET ORAL
Status: DISCONTINUED | OUTPATIENT
Start: 2023-11-08 | End: 2023-11-09 | Stop reason: HOSPADM

## 2023-11-08 RX ORDER — PREDNISONE 10 MG/1
10 TABLET ORAL DAILY
COMMUNITY
Start: 2023-10-31

## 2023-11-08 RX ADMIN — METHYLPREDNISOLONE SODIUM SUCCINATE 125 MG: 125 INJECTION, POWDER, FOR SOLUTION INTRAMUSCULAR; INTRAVENOUS at 05:51

## 2023-11-08 RX ADMIN — DOCUSATE SODIUM 50MG AND SENNOSIDES 8.6MG 2 TABLET: 8.6; 5 TABLET, FILM COATED ORAL at 22:03

## 2023-11-08 RX ADMIN — HEPARIN SODIUM 5000 UNITS: 5000 INJECTION INTRAVENOUS; SUBCUTANEOUS at 22:03

## 2023-11-08 RX ADMIN — GABAPENTIN 600 MG: 300 CAPSULE ORAL at 22:03

## 2023-11-08 RX ADMIN — ACETAMINOPHEN 650 MG: 325 TABLET ORAL at 03:10

## 2023-11-08 RX ADMIN — Medication 5 MG: at 22:03

## 2023-11-08 RX ADMIN — BUDESONIDE AND FORMOTEROL FUMARATE DIHYDRATE 2 PUFF: 160; 4.5 AEROSOL RESPIRATORY (INHALATION) at 19:26

## 2023-11-08 RX ADMIN — DOCUSATE SODIUM 50MG AND SENNOSIDES 8.6MG 2 TABLET: 8.6; 5 TABLET, FILM COATED ORAL at 08:38

## 2023-11-08 RX ADMIN — HEPARIN SODIUM 5000 UNITS: 5000 INJECTION INTRAVENOUS; SUBCUTANEOUS at 08:38

## 2023-11-08 RX ADMIN — Medication 1 PATCH: at 08:38

## 2023-11-08 RX ADMIN — GUAIFENESIN AND DEXTROMETHORPHAN 10 ML: 100; 10 SYRUP ORAL at 22:02

## 2023-11-08 RX ADMIN — LORAZEPAM 2 MG: 2 TABLET ORAL at 18:18

## 2023-11-08 RX ADMIN — PREDNISONE 40 MG: 20 TABLET ORAL at 08:38

## 2023-11-08 RX ADMIN — SULFUR HEXAFLUORIDE 2 ML: KIT at 10:09

## 2023-11-08 RX ADMIN — IPRATROPIUM BROMIDE AND ALBUTEROL SULFATE 3 ML: .5; 3 SOLUTION RESPIRATORY (INHALATION) at 05:42

## 2023-11-08 RX ADMIN — Medication 10 ML: at 22:03

## 2023-11-08 RX ADMIN — BUDESONIDE AND FORMOTEROL FUMARATE DIHYDRATE 2 PUFF: 160; 4.5 AEROSOL RESPIRATORY (INHALATION) at 08:37

## 2023-11-08 RX ADMIN — CARVEDILOL 25 MG: 25 TABLET, FILM COATED ORAL at 18:20

## 2023-11-08 RX ADMIN — FOLIC ACID 1 MG: 1 TABLET ORAL at 18:18

## 2023-11-08 RX ADMIN — GABAPENTIN 600 MG: 300 CAPSULE ORAL at 13:58

## 2023-11-08 RX ADMIN — LISINOPRIL 40 MG: 20 TABLET ORAL at 13:47

## 2023-11-08 RX ADMIN — LORAZEPAM 2 MG: 2 TABLET ORAL at 22:03

## 2023-11-08 RX ADMIN — AMLODIPINE BESYLATE 10 MG: 5 TABLET ORAL at 22:02

## 2023-11-08 RX ADMIN — IPRATROPIUM BROMIDE AND ALBUTEROL SULFATE 3 ML: .5; 3 SOLUTION RESPIRATORY (INHALATION) at 19:25

## 2023-11-08 RX ADMIN — TRAMADOL HYDROCHLORIDE 50 MG: 50 TABLET, COATED ORAL at 15:34

## 2023-11-08 NOTE — THERAPY EVALUATION
Patient Name: Dharmesh Samuel  : 1970    MRN: [id number]                              Today's Date: 2023       Admit Date: 2023    Visit Dx:     ICD-10-CM ICD-9-CM   1. Acute respiratory failure with hypoxia and hypercapnia  J96.01 518.81    J96.02    2. Alcoholic intoxication without complication  F10.920 305.00   3. Injury of head, initial encounter  S09.90XA 959.01     Patient Active Problem List   Diagnosis    Acute respiratory failure with hypoxia and hypercapnia    COPD exacerbation    Acute respiratory failure with hypoxia     History reviewed. No pertinent past medical history.  History reviewed. No pertinent surgical history.   General Information       Row Name 23          OT Time and Intention    Document Type evaluation  -SD     Mode of Treatment occupational therapy  -SD       Row Name 23 188          General Information    Patient Profile Reviewed yes  -SD     Prior Level of Function independent:;all household mobility;community mobility;ADL's  Lives in an  camper, has a cane and CPAP. States his wife left him yesterday and took his 8 y.o. daughter with her. Patient has L knee arthritis and states his knee gives out on him causing him to fall. 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  -SD     Existing Precautions/Restrictions fall  -SD     Barriers to Rehab medically complex;previous functional deficit;ineffective coping;environmental barriers  -SD       Row Name 23 1337          Living Environment    People in Home child(biju), dependent;spouse  -SD       Row Name 23 133          Home Main Entrance    Number of Stairs, Main Entrance one  -SD       Row Name 23 1335          Stairs Within Home, Primary    Number of Stairs, Within Home, Primary none  -SD       Row Name 23 1335          Cognition    Orientation Status (Cognition) oriented x 4  -SD       Row Name 23 133          Safety Issues, Functional Mobility    Safety Issues Affecting  Function (Mobility) safety precautions follow-through/compliance;safety precaution awareness;insight into deficits/self-awareness;impulsivity;awareness of need for assistance  -SD     Impairments Affecting Function (Mobility) balance;endurance/activity tolerance;pain;postural/trunk control;strength  -SD               User Key  (r) = Recorded By, (t) = Taken By, (c) = Cosigned By      Initials Name Provider Type    SD Marilyn Keating OT Occupational Therapist                     Mobility/ADL's       Row Name 11/08/23 1339          Bed Mobility    Bed Mobility supine-sit  -SD     Supine-Sit [place] (Bed Mobility) standby assist  -SD     Assistive Device (Bed Mobility) bed rails;head of bed elevated  -SD       Row Name 11/08/23 1339          Transfers    Transfers sit-stand transfer  -SD       Row Name 11/08/23 1339          Sit-Stand Transfer    Sit-Stand [place] (Transfers) standby assist  -SD     Assistive Device (Sit-Stand Transfers) walker, front-wheeled  -SD       Row Name 11/08/23 1339          Functional Mobility    Functional Mobility- Ind. Level contact guard assist  -SD     Functional Mobility- Device walker, front-wheeled  -SD     Functional Mobility-Distance (Feet) 7  -SD     Functional Mobility- Safety Issues balance decreased during turns;sequencing ability decreased;step length decreased  -SD     Functional Mobility- Comment dizzy upon standing, returned to EOB and /106  -SD       Row Name 11/08/23 1339          Activities of Daily Living    BADL Assessment/Intervention bathing;upper body dressing;lower body dressing;grooming;feeding;toileting  -SD       Row Name 11/08/23 1339          Bathing Assessment/Intervention    [place] Level (Bathing) minimum assist (75% patient effort)  -SD       Row Name 11/08/23 1339          Upper Body Dressing Assessment/Training    [place] Level (Upper Body Dressing) standby assist  -SD       Row Name 11/08/23 1339          Lower Body Dressing  Assessment/Training    [place] Level (Lower Body Dressing) minimum assist (75% patient effort)  -SD       Row Name 11/08/23 1339          Grooming Assessment/Training    [place] Level (Grooming) standby assist  -SD       Row Name 11/08/23 1339          Self-Feeding Assessment/Training    [place] Level (Feeding) supervision  -SD       Row Name 11/08/23 1339          Toileting Assessment/Training    [place] Level (Toileting) contact guard assist  -SD     Assistive Devices (Toileting) urinal  -SD               User Key  (r) = Recorded By, (t) = Taken By, (c) = Cosigned By      Initials Name Provider Type    SD Marilyn Keating OT Occupational Therapist                   Obj/Interventions       Row Name 11/08/23 1341          Range of Motion Comprehensive    General Range of Motion bilateral upper extremity ROM WFL  -SD       Row Name 11/08/23 1341          Strength Comprehensive (MMT)    General Manual Muscle Testing (MMT) Assessment no strength deficits identified  -SD     Comment, General Manual Muscle Testing (MMT) Assessment L knee arthritis, has chronic low back and knee pain  -SD               User Key  (r) = Recorded By, (t) = Taken By, (c) = Cosigned By      Initials Name Provider Type    SD Marilyn Keating OT Occupational Therapist                   Goals/Plan       Row Name 11/08/23 1349          Transfer Goal 1 (OT)    Activity/Assistive Device (Transfer Goal 1, OT) sit-to-stand/stand-to-sit;walker, rolling  -SD     [place] Level/Cues Needed (Transfer Goal 1, OT) modified independence  -SD     Time Frame (Transfer Goal 1, OT) long term goal (LTG)  -SD     Progress/Outcome (Transfer Goal 1, OT) goal ongoing  -SD       Row Name 11/08/23 1349          Dressing Goal 1 (OT)    Activity/Device (Dressing Goal 1, OT) lower body dressing;reacher;sock-aid  -SD     [place]/Cues Needed (Dressing Goal 1, OT) standby assist  -SD     Time Frame (Dressing Goal 1, OT) 2 weeks  -SD      Progress/Outcome (Dressing Goal 1, OT) goal ongoing  -SD       Row Name 11/08/23 1349          Toileting Goal 1 (OT)    Activity/Device (Toileting Goal 1, OT) toileting skills, all;commode  -SD     [place] Level/Cues Needed (Toileting Goal 1, OT) standby assist  -SD     Time Frame (Toileting Goal 1, OT) 2 weeks  -SD     Progress/Outcome (Toileting Goal 1, OT) goal ongoing  -SD       Row Name 11/08/23 1349          Strength Goal 1 (OT)    Strength Goal 1 (OT) Patient to perform UB ther ex as tolerated  -SD     Time Frame (Strength Goal 1, OT) long term goal (LTG)  -SD     Progress/Outcome (Strength Goal 1, OT) goal ongoing  -SD       Row Name 11/08/23 1349          Therapy Assessment/Plan (OT)    Planned Therapy Interventions (OT) activity tolerance training;adaptive equipment training;BADL retraining;patient/caregiver education/training;ROM/therapeutic exercise;strengthening exercise;transfer/mobility retraining  -SD               User Key  (r) = Recorded By, (t) = Taken By, (c) = Cosigned By      Initials Name Provider Type    SD Marilyn Keating OT Occupational Therapist                   Clinical Impression       Row Name 11/08/23 1342          Pain Assessment    Pretreatment Pain Rating 10/10  -SD     Posttreatment Pain Rating 10/10  -SD     Pain Location generalized  -SD     Pain Intervention(s) Repositioned;Ambulation/increased activity  -SD       Row Name 11/08/23 1342          Plan of Care Review    Plan of Care Reviewed With patient  -SD     Progress no change  -SD     Outcome Evaluation OT eval completed. Patient supine in bed, Ox4, c/o 10/10 pain. Patient reports he lives in a camper with his wife and 8 y.o. daughter, he has a cane. 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 Patient has chronic pain and reports decreased mobility at home d/t back and knee pain. Patient moved to EOB with SBA, required min A to [place] socks d/t body habitus and low back pain.  Patient performed sit to stand with SBA and c/o dizziness in standing. Patient insistent on walking to bathroom to urinate standing at commode. Dizziness worsened and patient returned to EOB, BP noted to be 174/106. Notified RN. Patient left sitting EOB, RN aware. Patient to continue with OT services prior to DC home. Patient would benefit from a RW and HH services.  -SD       Row Name 11/08/23 1342          Therapy Assessment/Plan (OT)    Patient/Family Therapy Goal Statement (OT) home  -SD     Rehab Potential (OT) fair, will monitor progress closely  -SD     Criteria for Skilled Therapeutic Interventions Met (OT) skilled treatment is necessary  -SD     Therapy Frequency (OT) 3 times/wk  5 times if indicated  -SD       Row Name 11/08/23 1342          Therapy Plan Review/Discharge Plan (OT)    Equipment Needs Upon Discharge (OT) walker, rolling  -SD     Anticipated Discharge Disposition (OT) home with home health;home with assist  -SD       Row Name 11/08/23 1342          Vital Signs    Pre Systolic BP Rehab 174  -SD     Pre Treatment Diastolic   -SD     O2 Delivery Pre Treatment room air  -SD     O2 Delivery Intra Treatment room air  -SD     O2 Delivery Post Treatment room air  -SD       Row Name 11/08/23 1342          Positioning and Restraints    Pre-Treatment Position in bed  -SD     Post Treatment Position bed  -SD     In Bed sitting EOB;encouraged to call for assist;call light within reach;notified nsg  -SD               User Key  (r) = Recorded By, (t) = Taken By, (c) = Cosigned By      Initials Name Provider Type    Marilyn Dela Cruz OT Occupational Therapist                   Outcome Measures       Row Name 11/08/23 1351          How much help from another is currently needed...    Putting on and taking off regular lower body clothing? 3  -SD     Bathing (including washing, rinsing, and drying) 3  -SD     Toileting (which includes using toilet bed pan or urinal) 3  -SD     Putting on and taking off  regular upper body clothing 3  -SD     Taking care of personal grooming (such as brushing teeth) 3  -SD     Eating meals 3  -SD     AM-PAC 6 Clicks Score (OT) 18  -SD       Row Name 11/08/23 0800 11/08/23 0650       How much help from another person do you currently need...    Turning from your back to your side while in flat bed without using bedrails? 2  -RP 2  -BD    Moving from lying on back to sitting on the side of a flat bed without bedrails? 2  -RP 2  -BD    Moving to and from a bed to a chair (including a wheelchair)? 2  -RP 2  -BD    Standing up from a chair using your arms (e.g., wheelchair, bedside chair)? 2  -RP 2  -BD    Climbing 3-5 steps with a railing? 2  -RP 2  -BD    To walk in hospital room? 2  -RP 2  -BD    AM-PAC 6 Clicks Score (PT) 12  -RP 12  -BD    Highest level of mobility 4 --> Transferred to chair/commode  -RP 4 --> Transferred to chair/commode  -BD      Row Name 11/08/23 1351          Functional Assessment    Outcome Measure Options AM-PAC 6 Clicks Daily Activity (OT)  -SD               User Key  (r) = Recorded By, (t) = Taken By, (c) = Cosigned By      Initials Name Provider Type    Marilyn Dela Cruz OT Occupational Therapist    Radha Duran, RN Registered Nurse    Fannie Ríos, RN Registered Nurse                    Occupational Therapy Education       Title: PT OT SLP Therapies (In Progress)       Topic: Occupational Therapy (In Progress)       Point: ADL training (Done)       Description:   Instruct learner(s) on proper safety adaptation and remediation techniques during self care or transfers.   Instruct in proper use of assistive devices.                  Learning Progress Summary             Patient Acceptance, E,TB, VU by SD at 11/8/2023 1351    Comment: OT POC                         Point: Home exercise program (Not Started)       Description:   Instruct learner(s) on appropriate technique for monitoring, assisting and/or progressing therapeutic  exercises/activities.                  Learner Progress:  Not documented in this visit.              Point: Precautions (Not Started)       Description:   Instruct learner(s) on prescribed precautions during self-care and functional transfers.                  Learner Progress:  Not documented in this visit.              Point: Body mechanics (Not Started)       Description:   Instruct learner(s) on proper positioning and spine alignment during self-care, functional mobility activities and/or exercises.                  Learner Progress:  Not documented in this visit.                              User Key       Initials Effective Dates Name Provider Type Discipline    SD 06/16/21 -  Marilyn Keating OT Occupational Therapist OT                  OT Recommendation and Plan  Planned Therapy Interventions (OT): activity tolerance training, adaptive equipment training, BADL retraining, patient/caregiver education/training, ROM/therapeutic exercise, strengthening exercise, transfer/mobility retraining  Therapy Frequency (OT): 3 times/wk (5 times if indicated)  Plan of Care Review  Plan of Care Reviewed With: patient  Progress: no change  Outcome Evaluation: OT eval completed. Patient supine in bed, Ox4, c/o 10/10 pain. Patient reports he lives in a camper with his wife and 8 y.o. daughter, he has a cane. 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 Patient has chronic pain and reports decreased mobility at home d/t back and knee pain. Patient moved to EOB with SBA, required min A to [place] socks d/t body habitus and low back pain. Patient performed sit to stand with SBA and c/o dizziness in standing. Patient insistent on walking to bathroom to urinate standing at commode. Dizziness worsened and patient returned to EOB, BP noted to be 174/106. Notified RN. Patient left sitting EOB, RN aware. Patient to continue with OT services prior to DC home. Patient would benefit from a RW and HH  services.     Time Calculation:   Evaluation Complexity (OT)  Review Occupational Profile/Medical/Therapy History Complexity: expanded/moderate complexity  Assessment, Occupational Performance/Identification of Deficit Complexity: 3-5 performance deficits  Clinical Decision Making Complexity (OT): detailed assessment/moderate complexity  Overall Complexity of Evaluation (OT): moderate complexity     Time Calculation- OT       Row Name 11/08/23 1352             Time Calculation- OT    OT Start Time 1112  -SD      OT Received On 11/08/23  -SD      OT Goal Re-Cert Due Date 11/20/23  -SD         Untimed Charges    OT Eval/Re-eval Minutes 45  -SD         Total Minutes    Untimed Charges Total Minutes 45  -SD       Total Minutes 45  -SD                User Key  (r) = Recorded By, (t) = Taken By, (c) = Cosigned By      Initials Name Provider Type    Marilyn Dela Cruz OT Occupational Therapist                  Therapy Charges for Today       Code Description Service Date Service Provider Modifiers Qty    [id number] HC OT EVAL MOD COMPLEXITY 3 11/8/2023 Marilyn Keating OT GO 1                 Marilyn Keating OT  11/8/2023

## 2023-11-08 NOTE — H&P
[facility]   [facility]ISTORY AND [facility]YSICAL      Name:  Dharmesh Samuel   Age:  52 y.o.  Sex:  male  :  1970  MRN:  [id number]   Visit Number:  [id number]  Admission Date:  2023  Date Of Service:  23  Primary Care Physician:  Michelle Sky APRN    Chief Complaint:     Confusion, low oxygen    History Of Presenting Illness:      The patient is an apparent 52-year-old with a medical history of MADELAINE, tobacco use, chronic pain, chronic kidney disease stage III, hypertension, obesity, alcohol use, who presented from home via EMS with apparently multiple complaints.  History obtained from ER record, provider, patient.  Per report, EMS had been called out after the patient apparently had had a fall and hit his head on a railing at home.  Unsure if he lost consciousness.  He had apparently appeared intoxicated upon arrival and was brought to the emergency room.  He was also noted to be hypoxic per report.  At time of my visit, the patient was awake, alert, able to follow commands.  He was unaware of how he got to the hospital.  When asked about alcohol use, he notes he does drink quite frequently at nighttime.  He also admitted to having sleep apnea and supposed to use a CPAP/BiPAP but does not always.  He is unsure of where he was at tonight prior to coming in.  He has had some shortness of breath but denies any chest pain, fever, cough.  He was asking if he can go outside and smoke a cigarette.    In the ER, patient's blood pressure was stable.  He is temperature was 97 his heart rate was in the 60 range.  He was initially on nonrebreather mask and placed on BiPAP.  Labs with white count 9 hemoglobin 13 platelets 271.  proBNP of 100.  Ethanol of 0.236.  Procalcitonin 0.09.  Creatinine was 1.4.  Urinalysis unremarkable.  UDS positive buprenorphine.  Initial troponin did elevate on repeat test, however decreased on a third troponin.  ABG notable for pH 7.210 PCO2 of 62.  CT of the  "cervical spine was unremarkable.  CT scan of the head without contrast was unremarkable.  Chest x-ray appeared to show some atelectasis otherwise no acute abnormality.  The patient was monitored in the emergency room for multiple hours, so oxygen had been weaned off a couple times, however he continued to show hypoxia and will drop into the mid upper 80s.  We were subsequent asked admit thereafter.    Review Of Systems:    All systems were reviewed and negative except as mentioned in history of presenting illness, assessment and plan.    Past Medical History: Patient  has no past medical history on file.    Past Surgical History: Patient  has no past surgical history on file.    Social History: Patient      Family History:  Patient's family history has been reviewed and found to be noncontributory.     Allergies:      Patient has no allergy information on record.    Home Medications:    Prior to Admission Medications       None          ED Medications:    Medications   acetaminophen (TYLENOL) tablet 650 mg (650 mg Oral Given 11/8/23 0310)   ipratropium-albuterol (DUO-NEB) nebulizer solution 3 mL (has no administration in time range)   methylPREDNISolone sodium succinate (SOLU-Medrol) injection 125 mg (has no administration in time range)     Vital Signs:  Temp:  [96.5 °F (35.8 °C)] 96.5 °F (35.8 °C)  Heart Rate:  [49-68] 51  Resp:  [14] 14  BP: ()/(58-76) 107/71        11/07/23 2232   Weight: 136 kg (300 lb)     Body mass index is 40.69 kg/m².    Physical Exam:     Most recent vital Signs: /71   Pulse 51   Temp 96.5 °F (35.8 °C) (Axillary)   Resp 14   Ht 182.9 cm (72\")   Wt 136 kg (300 lb)   SpO2 97%   BMI 40.69 kg/m²     Physical Exam  Constitutional:       Appearance: He is obese. He is not toxic-appearing or diaphoretic.   Eyes:      Extraocular Movements: Extraocular movements intact.      Conjunctiva/sclera: Conjunctivae normal.      Pupils: Pupils are equal, round, and reactive to light. "   Cardiovascular:      Rate and Rhythm: Normal rate and regular rhythm.      Pulses: Normal pulses.      Heart sounds: No murmur heard.     No gallop.   Pulmonary:      Breath sounds: No stridor. Wheezing present. No rhonchi or rales.      Comments: Mildly tachypneic at rest  Abdominal:      General: Abdomen is flat. Bowel sounds are normal. There is no distension.      Tenderness: There is no abdominal tenderness. There is no guarding.   Musculoskeletal:      Right lower leg: No edema.      Left lower leg: No edema.   Skin:     General: Skin is warm.      Capillary Refill: Capillary refill takes less than 2 seconds.      Findings: No lesion or rash.   Neurological:      General: No focal deficit present.      Mental Status: He is alert and oriented to person, place, and time. Mental status is at baseline.      Motor: Weakness present.   Psychiatric:         Mood and Affect: Mood normal.         Thought Content: Thought content normal.         Laboratory data:    I have reviewed the labs done in the emergency room.    Results from last 7 days   Lab Units 11/07/23  2238   SODIUM mmol/L 134*   POTASSIUM mmol/L 3.7   CHLORIDE mmol/L 98   CO2 mmol/L 23.0   BUN mg/dL 31*   CREATININE mg/dL 1.40*   CALCIUM mg/dL 8.2   BILIRUBIN mg/dL 0.3   ALK PHOS U/L 71   ALT (SGPT) U/L 41   AST (SGOT) U/L 40   GLUCOSE mg/dL 115*     Results from last 7 days   Lab Units 11/07/23 2238   WBC 10*3/mm3 9.65   HEMOGLOBIN g/dL 13.9   HEMATOCRIT % 40.5   PLATELETS 10*3/mm3 271         Results from last 7 days   Lab Units 11/08/23  0312 11/08/23  0104 11/07/23 2238   HSTROP T ng/L 47* 54* 36*     Results from last 7 days   Lab Units 11/07/23  2238   PROBNP pg/mL 100.9             Results from last 7 days   Lab Units 11/08/23  0409   PH, ARTERIAL pH units 7.253*   PO2 ART mm Hg 45.9*   PCO2, ARTERIAL mm Hg 52.1*   HCO3 ART mmol/L 23.0     Results from last 7 days   Lab Units 11/07/23  2314   COLOR UA  Yellow   GLUCOSE UA  Negative   KETONES UA   Negative   BLOOD UA  Negative   LEUKOCYTES UA  Negative   PH, URINE  5.5   BILIRUBIN UA  Negative   UROBILINOGEN UA  0.2 E.U./dL       Pain Management Panel          Latest Ref Rng & Units 11/7/2023   Pain Management Panel   Amphetamine, Urine Qual Negative Negative    Barbiturates Screen, Urine Negative Negative    Benzodiazepine Screen, Urine Negative Negative    Buprenorphine, Screen, Urine Negative Positive    Cocaine Screen, Urine Negative Negative    Fentanyl, Urine Negative Negative    Methadone Screen , Urine Negative Negative    Methamphetamine, Ur Negative Negative        EKG:      Appear to be a sinus rhythm with a normal rate.  No acute ST elevation or T wave changes.    Radiology:    XR Chest 1 View    Result Date: 11/8/2023  FINAL REPORT TECHNIQUE: null CLINICAL HISTORY: soa COMPARISON: null FINDINGS: Single view of the chest Comparison: None Findings: Low lung volumes. Otherwise normal heart, lungs and mediastinum. No evidence for pneumonia.     Impression: Low lung volumes. Authenticated and Electronically Signed by Chace Perdomo MD on 11/08/2023 01:12:39 AM    CT Cervical Spine Without Contrast    Result Date: 11/8/2023  FINAL REPORT CLINICAL HISTORY: fall FINDINGS: Axial CT images of the cervical spine were obtained without contrast. Sagittal and coronal reformatted images were also obtained. This study was performed with techniques to keep radiation doses as low as reasonably achievable (ALARA). Individualized dose reduction techniques using automated exposure control or adjustment of mA and/or kV according to the patient''s size were employed.  Motion artifact and patient body habitus limits this study.  There is no definite evidence of fracture or dislocation. The bony alignment is normal.  There are moderate and severe degenerative changes, worst on the cervical spine There is no evidence of canal stenosis. No paraspinous soft tissue abnormality is seen. Limited images of the upper thorax are  unremarkable.     No definite fracture or other acute bony abnormality identified. Authenticated and Electronically Signed by Adria Garibay III, MD on 11/08/2023 12:05:18 AM    CT Head Without Contrast    Result Date: 11/8/2023  FINAL REPORT CLINICAL HISTORY: fall, head injury FINDINGS: Axial images of the head were obtained without contrast. Coronal reformatted images were also obtained.This study was performed with techniques to keep radiation doses as low as reasonably achievable (ALARA). Individualized dose reduction techniques using automated exposure control or adjustment of mA and/or kV according to the patient''s size were employed.  There is motion on many images.  There is no evidence of intracranial hemorrhage or mass. The ventricular size is within normal limits. There is no evidence of shift of the midline structures. No abnormal extra axial fluid collection is identified. No skull abnormality is seen on the bone window images.  There is complete opacification of the right maxillary sinus.     No acute intracranial abnormality. Authenticated and Electronically Signed by Adria Garibay III, MD on 11/08/2023 12:05:11 AM     Assessment:    Acute respiratory failure with hypoxia and hypercapnia, POA  Suspected COPD with exacerbation  Reported fall  Elevated troponin  Uncontrolled AMDELAINE  Alcohol intoxication  Altered mental status, resolved  Chronic kidney disease stage III  Lupus  RA  Chronic tobacco abuse    Plan:    Admit as observation    Respiratory failure:  Likely combination of uncontrolled sleep apnea in setting of ongoing tobacco abuse and alcohol intoxication.  He is also on multiple sedating medications at home including gabapentin, tramadol, and was noted to be positive for buprenorphine in UDS.  Have placed on steroids and bronchodilators for now.  Does not appear to be overloaded or have aspirated.  No indication for antibiotics.  Wean oxygen as tolerated.  Continue with  BiPAP.    Elevated troponin:  Suspect type II demand related in setting of hypercapnia and hypoxia.  Monitor on telemetry.  We will add a 2D echo.    Reported fall:  Patient unaware of fall tonight per his report, but notes he has had multiple falls which he attributes to knee issues due to his arthritis.  He does have some mild abrasions on the hand, appears otherwise have normal range of motion of the extremities.  Doubt he had true syncopal episode.    AMS:  Suspect related to alcohol intoxication and hypercapnia.  This is already improved and he is back to baseline.  Lower suspicion for CVA is presenting issue.    CKD 3:  Numbers overall appear stable.  Follows with nephrology.    Patient otherwise meets observation level of care and anticipate less than 2 midnight stay.  Further recommendation depend upon clinical course.    Risk Assessment: High  DVT Prophylaxis: Heparin  Code Status: Full  Diet: Renal    Advance Care Planning   ACP discussion was held with the patient during this visit. Patient does not have an advance directive, declines further assistance.           Lisa Dias,   11/08/23  05:41 EST    Dictated utilizing Dragon dictation.

## 2023-11-08 NOTE — PLAN OF CARE
Goal Outcome Evaluation:  Plan of Care Reviewed With: patient        Progress: no change  Outcome Evaluation: OT eval completed. Patient supine in bed, Ox4, c/o 10/10 pain. Patient reports he lives in a camper with his wife and 8 y.o. daughter, he has a cane. 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 Patient has chronic pain and reports decreased mobility at home d/t back and knee pain. Patient moved to EOB with SBA, required min A to [place] socks d/t body habitus and low back pain. Patient performed sit to stand with SBA and c/o dizziness in standing. Patient insistent on walking to bathroom to urinate standing at commode. Dizziness worsened and patient returned to EOB, BP noted to be 174/106. Notified RN. Patient left sitting EOB, RN aware. Patient to continue with OT services prior to DC home. Patient would benefit from a RW and HH services.      Anticipated Discharge Disposition (OT): home with home health, home with assist

## 2023-11-08 NOTE — PLAN OF CARE
Goal Outcome Evaluation:  Plan of Care Reviewed With: patient        Progress: no change  Outcome Evaluation: Pt participated in PT [facility]. Pt receieved in supine, initially hesistance to participated in PT, however stated he needed to use the restroom. Pt t/f'd to EOB with SBA. able to maintain balance at OEB. Pt reported chronic pain that limits mobility. Pt required assist with donning socks. Pt thenstood with CGA from EOB. cues for hand placemetn and use of Rwx. In standing, Pt reported dizziness that did not subside with minutes of standing. Pt requesting to snow restroom, attempted 7 ft of gait but had to return to sitting EOB due to dizziness. BP in sitting taken 174/106. RN notified. Pt left sitting at EOB. Pt would benefit from cont skilled PTx during this inpatient stay and HHPT at disch.      Anticipated Discharge Disposition (PT): home with assist, home with home health

## 2023-11-08 NOTE — CASE MANAGEMENT/SOCIAL WORK
"Discharge Planning Assessment  [facility]     Patient Name: Dharmesh Samuel  MRN: [id number]  Today's Date: 2023    Admit Date: 2023    Plan: Pt sitting on bedside eating lunch. Apologizes because he is in a \"grumpy\" mood. Confirmed his name, , phone number and address. PCP onel Sky. Unsure when he last saw her but has an appt 15. Has a CPAP at home he uses sometimes. Unsure of provider. Reports his wife and daughter were goe when he woke up yesterday, so he is unsure if she has \"left\" him and unsure of how he will get home at [place]. States he doesnt have his wallet or phone. Gives permission to call wife Lavonne at [place]. [phone]. Plan to [place] home   Discharge Needs Assessment       Row Name 23 1234       Living Environment    People in Home spouse;child(biju), dependent    Name(s) of People in Home Lavonne Samuel, wife    Current Living Arrangements home    Potentially Unsafe Housing Conditions none    In the past 12 months has the electric, gas, oil, or water company threatened to shut off services in your home? No    Primary Care Provided by self    Provides Primary Care For no one    Family Caregiver if Needed none    Quality of Family Relationships stressful    Able to Return to Prior Arrangements yes    Living Arrangement Comments reports he is unsure if his wife \"left him\". She and his daughter was gone when he woke up and she checked herself into a hotel       Resource/Environmental Concerns    Resource/Environmental Concerns reliable transportation    Transportation Concerns rides, unreliable from others       Transportation Needs    In the past 12 months, has lack of transportation kept you from medical appointments or from getting medications? no    In the past 12 months, has lack of transportation kept you from meetings, work, or from getting things needed for daily living? No       Food Insecurity    Within the past 12 months, you worried that your food would run out before you got " "the money to buy more. Never true    Within the past 12 months, the food you bought just didn't last and you didn't have money to get more. Never true       Transition Planning    Patient/Family Anticipates Transition to home    Patient/Family Anticipated Services at Transition none    Transportation Anticipated other (see comments)  pt unsure. states he doesnt have his phone, wallet or any phone numbers       Discharge Needs Assessment    Readmission Within the Last 30 Days no previous admission in last 30 days    Concerns Comments transportation home    Anticipated Changes Related to Illness none    Equipment Needed After Discharge none    Provided Post Acute Provider List? N/A    Provided Post Acute Provider Quality & Resource List? N/A    Current Discharge Risk substance use/abuse;physical impairment                   Discharge Plan       Row Name 23 4244       Plan    Plan Pt sitting on bedside eating lunch. Apologizes because he is in a \"grumpy\" mood. Confirmed his name, , phone number and address. PCP onel Sky. Unsure when he last saw her but has an appt 15. Has a CPAP at home he uses sometimes. Unsure of provider. Reports his wife and daughter were goe when he woke up yesterday, so he is unsure if she has \"left\" him and unsure of how he will get home at [place]. States he doesnt have his wallet or phone. Gives permission to call wife Lavonne at [place]. [phone]. Plan to [place] home                  Continued Care and Services - Admitted Since 2023    Coordination has not been started for this encounter.       Expected Discharge Date and Time       Expected Discharge Date Expected Discharge Time    2023            Demographic Summary       Row Name 23 1236       General Information    Admission Type observation    Arrived From emergency department    Preferred Language English       Contact Information    Permission Granted to Share Info With family/designee    Contact Information " Obtained for     Contact Information Comments Lavonne SamuelUopal-sgia-[phone]                   Functional Status       Row Name 11/08/23 1231       Functional Status    Usual Activity Tolerance good    Current Activity Tolerance moderate    Functional Status Comments completes ADLs independently       Physical Activity    On average, how many days per week do you engage in moderate to strenuous exercise (like a brisk walk)? 0 days    On average, how many minutes do you engage in exercise at this level? 0 min    Number of minutes of exercise per week 0       Assessment of Health Literacy    How often do you have someone help you read hospital materials? Sometimes    How often do you have problems learning about your medical condition because of difficulty understanding written information? Sometimes    How often do you have a problem understanding what is told to you about your medical condition? Sometimes    How confident are you filling out medical forms by yourself? Somewhat       Functional Status, IADL    Medications independent    Meal Preparation independent    Housekeeping independent    Laundry independent    Shopping independent       Mental Status    General Appearance WDL X  debora       Employment/    Employment Status unemployed                   Psychosocial    No documentation.                  Abuse/Neglect    No documentation.                  Legal    No documentation.                  Substance Abuse       Row Name 11/08/23 1233       Substance Use    Substance Use Status other (see comments)    Substance Use Comment current ETOH use                   Patient Forms    No documentation.                     Naty Maria, APRN

## 2023-11-08 NOTE — PROGRESS NOTES
[facility]    Name:  Dharmesh Samuel   Age:  52 y.o.  Sex:  male  :  1970  MRN:  [id number]   Visit Number:  [id number]  Admission Date:  2023  Date Of Service:  23  Primary Care Physician:  Michelle Sky APRN     LOS: 0 days :    Chief Complaint:      Confusion/ Hypoxia    Subjective:    Patient seen and examined at bedside this morning.  He is getting echo at time of exam.  States he was at his neighbor's house last night drinking more than his usual and fell.  Does not remember getting to the hospital.  States he always hurts all over.  States his wife left yesterday.  Advises he usually drinks about 4 cups of vodka/ OJ nightly and has for many years, sometimes more.  States he is disabled.  States he smokes about 1PPD and has for 30+years.  States he does have Cpap for MADELAINE, however, only uses 1-2x/ week as he doesn't like it.  Denies shortness of breath, but states that his chronic pain takes his breath often.     Hospital Course:    The patient is an apparent 52-year-old with a medical history of MADELAINE, tobacco use, chronic pain, chronic kidney disease stage III, hypertension, obesity, alcohol use, who presented from home via EMS with apparently multiple complaints.  History obtained from ER record, provider, patient.  Per report, EMS had been called out after the patient apparently had had a fall and hit his head on a railing at home.  Unsure if he lost consciousness.  He had apparently appeared intoxicated upon arrival and was brought to the emergency room.  He was also noted to be hypoxic per report.  At time of my visit, the patient was awake, alert, able to follow commands.  He was unaware of how he got to the hospital.  When asked about alcohol use, he notes he does drink quite frequently at nighttime.  He also admitted to having sleep apnea and supposed to use a CPAP/BiPAP but does not always.  He is unsure of where he was at tonight prior  to coming in.  He has had some shortness of breath but denies any chest pain, fever, cough.  He was asking if he can go outside and smoke a cigarette.     In the ER, patient's blood pressure was stable.  He is temperature was 97 his heart rate was in the 60 range.  He was initially on nonrebreather mask and placed on BiPAP.  Labs with white count 9 hemoglobin 13 platelets 271.  proBNP of 100.  Ethanol of 0.236.  Procalcitonin 0.09.  Creatinine was 1.4.  Urinalysis unremarkable.  UDS positive buprenorphine.  Initial troponin did elevate on repeat test, however decreased on a third troponin.  ABG notable for pH 7.210 PCO2 of 62.  CT of the cervical spine was unremarkable.  CT scan of the head without contrast was unremarkable.  Chest x-ray appeared to show some atelectasis otherwise no acute abnormality.  The patient was monitored in the emergency room for multiple hours, so oxygen had been weaned off a couple times, however he continued to show hypoxia and will drop into the mid upper 80s.  We were subsequent asked admit thereafter.    Review of Systems:     All systems were reviewed and negative except as mentioned in subjective, assessment and plan.    Vital Signs:    Temp:  [96.5 °F (35.8 °C)] 96.5 °F (35.8 °C)  Heart Rate:  [49-68] 50  Resp:  [14-20] 20  BP: ()/(58-84) 106/84    Intake and output:    No intake/output data recorded.  No intake/output data recorded.    Physical Examination:    General Appearance:  Alert and cooperative, middle aged male, no acute distress on exam, obese with BMI-40, appears chronically ill   Head:  Atraumatic and normocephalic.   Eyes: Conjunctivae and sclerae normal, no icterus. No pallor.   Throat: No oral lesions, no thrush, oral mucosa moist.   Neck: Supple, trachea midline   Lungs:   Breath sounds heard bilaterally equally but diminished throughout.  No wheezing or crackles. Unlabored with stable sats on 2L   Heart:  Normal S1 and S2, no murmur, no gallop, no rub. No  JVD.   Abdomen:   Normal bowel sounds, no masses, no organomegaly. Soft, nontender, nondistended, no rebound tenderness.   Extremities: Supple, no edema, no cyanosis, no clubbing.   Skin: No bleeding or rash.   Neurologic: Alert and oriented x 3. No facial asymmetry. Moves all four limbs. No tremors. Generalized weakness is present.     Laboratory results:    Results from last 7 days   Lab Units 11/07/23  2238   SODIUM mmol/L 134*   POTASSIUM mmol/L 3.7   CHLORIDE mmol/L 98   CO2 mmol/L 23.0   BUN mg/dL 31*   CREATININE mg/dL 1.40*   CALCIUM mg/dL 8.2   BILIRUBIN mg/dL 0.3   ALK PHOS U/L 71   ALT (SGPT) U/L 41   AST (SGOT) U/L 40   GLUCOSE mg/dL 115*     Results from last 7 days   Lab Units 11/07/23  2238   WBC 10*3/mm3 9.65   HEMOGLOBIN g/dL 13.9   HEMATOCRIT % 40.5   PLATELETS 10*3/mm3 271         Results from last 7 days   Lab Units 11/08/23  0312 11/08/23  0104 11/07/23  2238   HSTROP T ng/L 47* 54* 36*         Recent Labs     11/08/23  0116 11/08/23  0409   PHART 7.210* 7.253*   VYL3MXP 62.8* 52.1*   PO2ART 79.1 45.9*   AHY5YBW 25.1 23.0   BASEEXCESS -4.1* -4.6*      I have reviewed the patient's laboratory results.    Radiology results:    Adult Transthoracic Echo Complete With Contrast if Necessary Per Protocol    Result Date: 11/8/2023    Left ventricular systolic function is hyperdynamic (EF > 70%). Estimated left ventricular EF = 77% Left ventricular ejection fraction appears to be greater than 70%.   Left ventricular diastolic function was normal.     XR Chest 1 View    Result Date: 11/8/2023  FINAL REPORT TECHNIQUE: null CLINICAL HISTORY: soa COMPARISON: null FINDINGS: Single view of the chest Comparison: None Findings: Low lung volumes. Otherwise normal heart, lungs and mediastinum. No evidence for pneumonia.     Impression: Impression: Low lung volumes. Authenticated and Electronically Signed by Chace Perdomo MD on 11/08/2023 01:12:39 AM    CT Cervical Spine Without Contrast    Result Date:  11/8/2023  FINAL REPORT CLINICAL HISTORY: fall FINDINGS: Axial CT images of the cervical spine were obtained without contrast. Sagittal and coronal reformatted images were also obtained. This study was performed with techniques to keep radiation doses as low as reasonably achievable (ALARA). Individualized dose reduction techniques using automated exposure control or adjustment of mA and/or kV according to the patient''s size were employed.  Motion artifact and patient body habitus limits this study.  There is no definite evidence of fracture or dislocation. The bony alignment is normal.  There are moderate and severe degenerative changes, worst on the cervical spine There is no evidence of canal stenosis. No paraspinous soft tissue abnormality is seen. Limited images of the upper thorax are unremarkable.     Impression: No definite fracture or other acute bony abnormality identified. Authenticated and Electronically Signed by Adria Garibay III, MD on 11/08/2023 12:05:18 AM    CT Head Without Contrast    Result Date: 11/8/2023  FINAL REPORT CLINICAL HISTORY: fall, head injury FINDINGS: Axial images of the head were obtained without contrast. Coronal reformatted images were also obtained.This study was performed with techniques to keep radiation doses as low as reasonably achievable (ALARA). Individualized dose reduction techniques using automated exposure control or adjustment of mA and/or kV according to the patient''s size were employed.  There is motion on many images.  There is no evidence of intracranial hemorrhage or mass. The ventricular size is within normal limits. There is no evidence of shift of the midline structures. No abnormal extra axial fluid collection is identified. No skull abnormality is seen on the bone window images.  There is complete opacification of the right maxillary sinus.     Impression: No acute intracranial abnormality. Authenticated and Electronically Signed by Adria Garibay  III, MD on 11/08/2023 12:05:11 AM   I have reviewed the patient's radiology reports.    Medication Review:     I have reviewed the patient's active and prn medications.     Problem List:      Acute respiratory failure with hypoxia and hypercapnia    COPD exacerbation    Acute respiratory failure with hypoxia      Assessment:    Acute respiratory failure with hypoxia and hypercapnia, POA  Suspected COPD with exacerbation  Reported fall  Elevated troponin  Uncontrolled MADELAINE noncompliant with cpap  Alcohol intoxication and chronic use  Altered mental status, resolved  Chronic kidney disease stage III  Lupus  RA on Embrel  Chronic tobacco abuse    Plan:    Respiratory failure:  -Likely combination of uncontrolled sleep apnea in setting of ongoing tobacco abuse and alcohol intoxication.    -He is also on multiple sedating medications at home including gabapentin, tramadol, and was noted to be positive for buprenorphine in UDS.    -Continue steroids and bronchodilators for now.    -Does not appear to be overloaded or have aspirated.    -No indication for antibiotics.    -Wean oxygen as tolerated.    -Continue with BiPAP.     Elevated troponin:  -Suspect type II demand related in setting of hypercapnia and hypoxia.  -Monitor on telemetry.    -2D echo noted left systolic function hyperdynamic with EF-77% and diastolic function normal     Reported fall:  -Patient unaware of fall tonight per his report, but notes he has had multiple falls which he attributes to knee issues due to his arthritis.  He does have some mild abrasions on the hand, appears otherwise have normal range of motion of the extremities.  Doubt he had true syncopal episode, more likely chronic debility with alcohol abuse     AMS:  -Suspect related to alcohol intoxication and hypercapnia.  This is already improved and he is back to baseline.      CKD 3:  -Numbers overall appear stable close to baseline.    -Follows with nephrology.    Chronic  -Will restart  chronic Gabapentin/ Tramadol at lower frequency  -Nicotine patch  -CIWA monitoring per nursing  -Smoking cessation  -Alcohol cessation  -Will order PRN Ativan for CIWA assessments    DVT Prophylaxis: Heparin  Code Status: Full Code  Diet: Renal  Discharge Plan: 1-2 days    Maylin Pham, APRN  11/08/23  10:47 EST    Dictated utilizing Dragon dictation.

## 2023-11-08 NOTE — PAYOR COMM NOTE
"To:  [place]  From: Neetu Ness RN  Phone: [phone]  Fax: [phone]  NPI: [id number]  TIN: [id number]  Member ID: [id number]   MRN: [id number]    Dharmesh Cary (52 y.o. Male)       Date of Birth   1970    Social Security Number       Address   [address] [place] [address]    Home Phone   [phone]    MRN   [id number]       Nondenominational   None    Marital Status                               Admission Date   23    Admission Type   Emergency    Admitting Provider   Lisa Dias DO    Attending Provider   Dre Salas MD    Department, Room/Bed   [facility] TELEMETRY SDS OVERFLOW, T03       Discharge Date       Discharge Disposition       Discharge Destination                                 Attending Provider: Dre Salas MD    Allergies: Not on File    Isolation: None   Infection: None   Code Status: CPR    Ht: 183 cm (72.05\")   Wt: 134 kg (295 lb 6.7 oz)    Admission Cmt: None   Principal Problem: Acute respiratory failure with hypoxia and hypercapnia [J96.01,J96.02]                   Active Insurance as of 2023       Primary Coverage       Payor Plan Insurance Group Employer/Plan Group    [employer] [place] [employer] [facility] PPO [id number]       Payor Plan Address Payor Plan Phone Number Payor Plan Fax Number Effective Dates    [address] [phone]  10/11/2023 - None Entered    [place] [address]         Subscriber Name Subscriber Birth Date Member ID       LAVONNE CARY 1979 [id number]                     Emergency Contacts        (Rel.) Home Phone Work Phone Mobile Phone    Lavonne Cary (Spouse) [phone] -- --                 History & Physical        Lisa Dias DO at 23 0541            [facility] HOSPITALIST   HISTORY AND PHYSICAL      Name:  Dharmesh Cary   Age:  52 y.o.  Sex:  male  :  1970  MRN:  [id number]   Visit Number:  " [id number]  Admission Date:  11/7/2023  Date Of Service:  11/08/23  Primary Care Physician:  Michelle Sky APRN    Chief Complaint:     Confusion, low oxygen    History Of Presenting Illness:      The patient is an apparent 52-year-old with a medical history of MADELAINE, tobacco use, chronic pain, chronic kidney disease stage III, hypertension, obesity, alcohol use, who presented from home via EMS with apparently multiple complaints.  History obtained from ER record, provider, patient.  Per report, EMS had been called out after the patient apparently had had a fall and hit his head on a railing at home.  Unsure if he lost consciousness.  He had apparently appeared intoxicated upon arrival and was brought to the emergency room.  He was also noted to be hypoxic per report.  At time of my visit, the patient was awake, alert, able to follow commands.  He was unaware of how he got to the hospital.  When asked about alcohol use, he notes he does drink quite frequently at nighttime.  He also admitted to having sleep apnea and supposed to use a CPAP/BiPAP but does not always.  He is unsure of where he was at tonight prior to coming in.  He has had some shortness of breath but denies any chest pain, fever, cough.  He was asking if he can go outside and smoke a cigarette.    In the ER, patient's blood pressure was stable.  He is temperature was 97 his heart rate was in the 60 range.  He was initially on nonrebreather mask and placed on BiPAP.  Labs with white count 9 hemoglobin 13 platelets 271.  proBNP of 100.  Ethanol of 0.236.  Procalcitonin 0.09.  Creatinine was 1.4.  Urinalysis unremarkable.  UDS positive buprenorphine.  Initial troponin did elevate on repeat test, however decreased on a third troponin.  ABG notable for pH 7.210 PCO2 of 62.  CT of the cervical spine was unremarkable.  CT scan of the head without contrast was unremarkable.  Chest x-ray appeared to show some atelectasis otherwise no acute abnormality.   "The patient was monitored in the emergency room for multiple hours, so oxygen had been weaned off a couple times, however he continued to show hypoxia and will drop into the mid upper 80s.  We were subsequent asked admit thereafter.    Review Of Systems:    All systems were reviewed and negative except as mentioned in history of presenting illness, assessment and plan.    Past Medical History: Patient  has no past medical history on file.    Past Surgical History: Patient  has no past surgical history on file.    Social History: Patient      Family History:  Patient's family history has been reviewed and found to be noncontributory.     Allergies:      Patient has no allergy information on record.    Home Medications:    Prior to Admission Medications       None          ED Medications:    Medications   acetaminophen (TYLENOL) tablet 650 mg (650 mg Oral Given 11/8/23 0310)   ipratropium-albuterol (DUO-NEB) nebulizer solution 3 mL (has no administration in time range)   methylPREDNISolone sodium succinate (SOLU-Medrol) injection 125 mg (has no administration in time range)     Vital Signs:  Temp:  [96.5 °F (35.8 °C)] 96.5 °F (35.8 °C)  Heart Rate:  [49-68] 51  Resp:  [14] 14  BP: ()/(58-76) 107/71        11/07/23  2232   Weight: 136 kg (300 lb)     Body mass index is 40.69 kg/m².    Physical Exam:     Most recent vital Signs: /71   Pulse 51   Temp 96.5 °F (35.8 °C) (Axillary)   Resp 14   Ht 182.9 cm (72\")   Wt 136 kg (300 lb)   SpO2 97%   BMI 40.69 kg/m²     Physical Exam  Constitutional:       Appearance: He is obese. He is not toxic-appearing or diaphoretic.   Eyes:      Extraocular Movements: Extraocular movements intact.      Conjunctiva/sclera: Conjunctivae normal.      Pupils: Pupils are equal, round, and reactive to light.   Cardiovascular:      Rate and Rhythm: Normal rate and regular rhythm.      Pulses: Normal pulses.      Heart sounds: No murmur heard.     No gallop.   Pulmonary:      " Breath sounds: No stridor. Wheezing present. No rhonchi or rales.      Comments: Mildly tachypneic at rest  Abdominal:      General: Abdomen is flat. Bowel sounds are normal. There is no distension.      Tenderness: There is no abdominal tenderness. There is no guarding.   Musculoskeletal:      Right lower leg: No edema.      Left lower leg: No edema.   Skin:     General: Skin is warm.      Capillary Refill: Capillary refill takes less than 2 seconds.      Findings: No lesion or rash.   Neurological:      General: No focal deficit present.      Mental Status: He is alert and oriented to person, place, and time. Mental status is at baseline.      Motor: Weakness present.   Psychiatric:         Mood and Affect: Mood normal.         Thought Content: Thought content normal.         Laboratory data:    I have reviewed the labs done in the emergency room.    Results from last 7 days   Lab Units 11/07/23  2238   SODIUM mmol/L 134*   POTASSIUM mmol/L 3.7   CHLORIDE mmol/L 98   CO2 mmol/L 23.0   BUN mg/dL 31*   CREATININE mg/dL 1.40*   CALCIUM mg/dL 8.2   BILIRUBIN mg/dL 0.3   ALK PHOS U/L 71   ALT (SGPT) U/L 41   AST (SGOT) U/L 40   GLUCOSE mg/dL 115*     Results from last 7 days   Lab Units 11/07/23  2238   WBC 10*3/mm3 9.65   HEMOGLOBIN g/dL 13.9   HEMATOCRIT % 40.5   PLATELETS 10*3/mm3 271         Results from last 7 days   Lab Units 11/08/23  0312 11/08/23  0104 11/07/23  2238   HSTROP T ng/L 47* 54* 36*     Results from last 7 days   Lab Units 11/07/23  2238   PROBNP pg/mL 100.9             Results from last 7 days   Lab Units 11/08/23  0409   PH, ARTERIAL pH units 7.253*   PO2 ART mm Hg 45.9*   PCO2, ARTERIAL mm Hg 52.1*   HCO3 ART mmol/L 23.0     Results from last 7 days   Lab Units 11/07/23  2314   COLOR UA  Yellow   GLUCOSE UA  Negative   KETONES UA  Negative   BLOOD UA  Negative   LEUKOCYTES UA  Negative   PH, URINE  5.5   BILIRUBIN UA  Negative   UROBILINOGEN UA  0.2 E.U./dL       Pain Management Panel           Latest Ref Rng & Units 11/7/2023   Pain Management Panel   Amphetamine, Urine Qual Negative Negative    Barbiturates Screen, Urine Negative Negative    Benzodiazepine Screen, Urine Negative Negative    Buprenorphine, Screen, Urine Negative Positive    Cocaine Screen, Urine Negative Negative    Fentanyl, Urine Negative Negative    Methadone Screen , Urine Negative Negative    Methamphetamine, Ur Negative Negative        EKG:      Appear to be a sinus rhythm with a normal rate.  No acute ST elevation or T wave changes.    Radiology:    XR Chest 1 View    Result Date: 11/8/2023  FINAL REPORT TECHNIQUE: null CLINICAL HISTORY: soa COMPARISON: null FINDINGS: Single view of the chest Comparison: None Findings: Low lung volumes. Otherwise normal heart, lungs and mediastinum. No evidence for pneumonia.     Impression: Low lung volumes. Authenticated and Electronically Signed by Chace Perdomo MD on 11/08/2023 01:12:39 AM    CT Cervical Spine Without Contrast    Result Date: 11/8/2023  FINAL REPORT CLINICAL HISTORY: fall FINDINGS: Axial CT images of the cervical spine were obtained without contrast. Sagittal and coronal reformatted images were also obtained. This study was performed with techniques to keep radiation doses as low as reasonably achievable (ALARA). Individualized dose reduction techniques using automated exposure control or adjustment of mA and/or kV according to the patient''s size were employed.  Motion artifact and patient body habitus limits this study.  There is no definite evidence of fracture or dislocation. The bony alignment is normal.  There are moderate and severe degenerative changes, worst on the cervical spine There is no evidence of canal stenosis. No paraspinous soft tissue abnormality is seen. Limited images of the upper thorax are unremarkable.     No definite fracture or other acute bony abnormality identified. Authenticated and Electronically Signed by Adria Garibay III, MD on 11/08/2023  12:05:18 AM    CT Head Without Contrast    Result Date: 11/8/2023  FINAL REPORT CLINICAL HISTORY: fall, head injury FINDINGS: Axial images of the head were obtained without contrast. Coronal reformatted images were also obtained.This study was performed with techniques to keep radiation doses as low as reasonably achievable (ALARA). Individualized dose reduction techniques using automated exposure control or adjustment of mA and/or kV according to the patient''s size were employed.  There is motion on many images.  There is no evidence of intracranial hemorrhage or mass. The ventricular size is within normal limits. There is no evidence of shift of the midline structures. No abnormal extra axial fluid collection is identified. No skull abnormality is seen on the bone window images.  There is complete opacification of the right maxillary sinus.     No acute intracranial abnormality. Authenticated and Electronically Signed by Adria Garibay III, MD on 11/08/2023 12:05:11 AM     Assessment:    Acute respiratory failure with hypoxia and hypercapnia, POA  Suspected COPD with exacerbation  Reported fall  Elevated troponin  Uncontrolled MADELAINE  Alcohol intoxication  Altered mental status, resolved  Chronic kidney disease stage III  Lupus  RA  Chronic tobacco abuse    Plan:    Admit as observation    Respiratory failure:  Likely combination of uncontrolled sleep apnea in setting of ongoing tobacco abuse and alcohol intoxication.  He is also on multiple sedating medications at home including gabapentin, tramadol, and was noted to be positive for buprenorphine in UDS.  Have placed on steroids and bronchodilators for now.  Does not appear to be overloaded or have aspirated.  No indication for antibiotics.  Wean oxygen as tolerated.  Continue with BiPAP.    Elevated troponin:  Suspect type II demand related in setting of hypercapnia and hypoxia.  Monitor on telemetry.  We will add a 2D echo.    Reported fall:  Patient unaware  of fall tonight per his report, but notes he has had multiple falls which he attributes to knee issues due to his arthritis.  He does have some mild abrasions on the hand, appears otherwise have normal range of motion of the extremities.  Doubt he had true syncopal episode.    AMS:  Suspect related to alcohol intoxication and hypercapnia.  This is already improved and he is back to baseline.  Lower suspicion for CVA is presenting issue.    CKD 3:  Numbers overall appear stable.  Follows with nephrology.    Patient otherwise meets observation level of care and anticipate less than 2 midnight stay.  Further recommendation depend upon clinical course.    Risk Assessment: High  DVT Prophylaxis: Heparin  Code Status: Full  Diet: Renal    Advance Care Planning  ACP discussion was held with the patient during this visit. Patient does not have an advance directive, declines further assistance.           Lisa Dias DO  11/08/23  05:41 EST    Dictated utilizing Dragon dictation.    Electronically signed by Lisa Dias DO at 11/08/23 0551       Vital Signs (last day)       Date/Time Temp Temp src Pulse Resp BP Patient Position SpO2    11/08/23 1008 -- -- -- -- 106/84 -- --    11/08/23 0900 -- -- -- -- 106/84 -- --    11/08/23 0708 -- -- -- 20 -- Lying --    11/08/23 0600 -- -- 50 -- 126/75 -- 99    11/08/23 0542 -- -- -- 19 -- -- --    11/08/23 0145 -- -- 51 -- 107/71 -- 97    11/08/23 0059 -- -- 49 -- 96/74 -- 95    11/08/23 0029 96.5 (35.8) Axillary -- -- -- -- --    11/08/23 0001 -- -- 57 -- 103/58 -- 89    11/07/23 2330 -- -- 65 -- 120/76 -- 92    11/07/23 2232 -- -- 68 14 128/71 Sitting 92          Current Facility-Administered Medications   Medication Dose Route Frequency Provider Last Rate Last Admin    acetaminophen (TYLENOL) tablet 650 mg  650 mg Oral Q6H PRN Lisa Dias DO   650 mg at 11/08/23 0310    aluminum-magnesium hydroxide-simethicone (MAALOX MAX) 400-400-40 MG/5ML suspension 15  mL  15 mL Oral Q6H PRN Lisa Dias DO        amLODIPine (NORVASC) tablet 10 mg  10 mg Oral Nightly Maylin Pham APRN        sennosides-docusate (PERICOLACE) 8.6-50 MG per tablet 2 tablet  2 tablet Oral BID Lisa Dias DO   2 tablet at 11/08/23 0838    And    polyethylene glycol (MIRALAX) packet 17 g  17 g Oral Daily PRN Lisa Dias DO        And    bisacodyl (DULCOLAX) EC tablet 5 mg  5 mg Oral Daily PRN Lisa Dias DO        And    bisacodyl (DULCOLAX) suppository 10 mg  10 mg Rectal Daily PRN Lisa Dias DO        budesonide-formoterol (SYMBICORT) 160-4.5 MCG/ACT inhaler 2 puff  2 puff Inhalation BID - RT Lisa Dias DO   2 puff at 11/08/23 0837    carvedilol (COREG) tablet 25 mg  25 mg Oral BID With Meals Maylin Pham APRN        [START ON 11/9/2023] escitalopram (LEXAPRO) tablet 10 mg  10 mg Oral QAM Maylin Pham APRN        gabapentin (NEURONTIN) capsule 600 mg  600 mg Oral Q8H Dre Salas MD        guaiFENesin-dextromethorphan (ROBITUSSIN DM) 100-10 MG/5ML syrup 10 mL  10 mL Oral Q6H PRN Lisa Dias DO        heparin (porcine) 5000 UNIT/ML injection 5,000 Units  5,000 Units Subcutaneous Q12H Lisa Dias DO   5,000 Units at 11/08/23 0838    ipratropium-albuterol (DUO-NEB) nebulizer solution 3 mL  3 mL Nebulization Q6H - RT Lisa Dias DO        lisinopril (PRINIVIL,ZESTRIL) tablet 40 mg  40 mg Oral Daily Maylin Pham APRN   40 mg at 11/08/23 1347    melatonin tablet 5 mg  5 mg Oral Nightly PRN Lisa Dias DO        nicotine (NICODERM CQ) 21 MG/24HR patch 1 patch  1 patch Transdermal Q24H Lisa Dias, DO   1 patch at 11/08/23 0838    nicotine polacrilex (NICORETTE) gum 2 mg  2 mg Mouth/Throat Q1H PRN Lisa Dias,         nitroglycerin (NITROSTAT) SL tablet 0.4 mg  0.4 mg Sublingual Q5 Min PRN Lisa Dias,         ondansetron  (ZOFRAN) tablet 4 mg  4 mg Oral Q6H PRN Lisa Dias, DO        Or    ondansetron (ZOFRAN) injection 4 mg  4 mg Intravenous Q6H PRN Lisa Diasus, DO        predniSONE (DELTASONE) tablet 40 mg  40 mg Oral Daily With Breakfast Lisa Dias, DO   40 mg at 11/08/23 0838    sodium chloride 0.9 % flush 10 mL  10 mL Intravenous Q12H Lisa Diasus, DO        sodium chloride 0.9 % flush 10 mL  10 mL Intravenous PRN Lisa Dias, DO        sodium chloride 0.9 % infusion 40 mL  40 mL Intravenous PRN Lisa Dias, DO        sodium chloride 0.9 % infusion  50 mL/hr Intravenous Continuous Lisa Dias DO        traMADol (ULTRAM) tablet 50 mg  50 mg Oral Q12H PRN Dre Salas MD         Lab Results (last 24 hours)       Procedure Component Value Units Date/Time    POC Glucose Once [[id number]]  (Normal) Collected: 11/08/23 1019    Specimen: Blood Updated: 11/08/23 1022     Glucose 126 mg/dL      Comment: Serial Number: [id number]:  [id number]       Blood Gas, Arterial With Co-Ox [[id number]]  (Abnormal) Collected: 11/08/23 0409    Specimen: Arterial Blood Updated: 11/08/23 0409     Site Left Brachial     Arjun's Test N/A     pH, Arterial 7.253 pH units      Comment: 84 Value below reference range        pCO2, Arterial 52.1 mm Hg      Comment: 83 Value above reference range        pO2, Arterial 45.9 mm Hg      Comment: 85 Value below critical limit        HCO3, Arterial 23.0 mmol/L      Base Excess, Arterial -4.6 mmol/L      Comment: 84 Value below reference range        O2 Saturation, Arterial 80.6 %      Comment: 84 Value below reference range        Hematocrit, Blood Gas 37.2 %      Comment: 84 Value below reference range        Oxyhemoglobin 75.8 %      Comment: 84 Value below reference range        Methemoglobin 0.40 %      Carboxyhemoglobin 5.6 %      Comment: 83 Value above reference range        A-a DO2 115.8 mmHg      Barometric Pressure for Blood  Gas 733 mmHg      Modality Nasal Cannula     FIO2 32 %      Ventilator Mode NA     Notified Who RN     Notified By [id number]     Notified Time 11/08/2023 05:16     Collected by RN     Comment: Meter: [id number]     :  [id number]        pH, Temp Corrected --     pCO2, Temperature Corrected --     pO2, Temperature Corrected --    Single High Sensitivity Troponin T [[id number]]  (Abnormal) Collected: 11/08/23 0312    Specimen: Blood Updated: 11/08/23 0339     HS Troponin T 47 ng/L     Narrative:      High Sensitive Troponin T Reference Range:  <14.0 ng/L- Negative Female for AMI  <22.0 ng/L- Negative Male for AMI  >=14 - Abnormal Female indicating possible myocardial injury.  >=22 - Abnormal Male indicating possible myocardial injury.   Clinicians would have to utilize clinical acumen, EKG, Troponin, and serial changes to determine if it is an Acute Myocardial Infarction or myocardial injury due to an underlying chronic condition.         High Sensitivity Troponin T 2Hr [[id number]]  (Abnormal) Collected: 11/08/23 0104    Specimen: Blood Updated: 11/08/23 0143     HS Troponin T 54 ng/L      Troponin T Delta 18 ng/L     Narrative:      High Sensitive Troponin T Reference Range:  <14.0 ng/L- Negative Female for AMI  <22.0 ng/L- Negative Male for AMI  >=14 - Abnormal Female indicating possible myocardial injury.  >=22 - Abnormal Male indicating possible myocardial injury.   Clinicians would have to utilize clinical acumen, EKG, Troponin, and serial changes to determine if it is an Acute Myocardial Infarction or myocardial injury due to an underlying chronic condition.         Blood Gas, Arterial With Co-Ox [[id number]]  (Abnormal) Collected: 11/08/23 0116    Specimen: Arterial Blood Updated: 11/08/23 0116     Site Right Radial     Arjun's Test Positive     pH, Arterial 7.210 pH units      pCO2, Arterial 62.8 mm Hg      pO2, Arterial 79.1 mm Hg      HCO3, Arterial 25.1 mmol/L      Base Excess, Arterial -4.1 mmol/L       O2 Saturation, Arterial 94.2 %      Hematocrit, Blood Gas 44.9 %      Oxyhemoglobin 87.6 %      Methemoglobin 0.20 %      Carboxyhemoglobin 6.7 %      A-a DO2 126.5 mmHg      Barometric Pressure for Blood Gas 734 mmHg      Modality BiPap     FIO2 40 %      Ventilator Mode NA     IPAP 16     Comment: Meter: [id number]     :  [id number]        EPAP 8     Collected by      pH, Temp Corrected --     pCO2, Temperature Corrected --     pO2, Temperature Corrected --    Urine Drug Screen - Urine, Clean Catch [[id number]]  (Abnormal) Collected: 11/07/23 2315    Specimen: Urine, Clean Catch Updated: 11/07/23 2335     THC, Screen, Urine Negative     Phencyclidine (PCP), Urine Negative     Cocaine Screen, Urine Negative     Methamphetamine, Ur Negative     Opiate Screen Negative     Amphetamine Screen, Urine Negative     Benzodiazepine Screen, Urine Negative     Tricyclic Antidepressants Screen Negative     Methadone Screen, Urine Negative     Barbiturates Screen, Urine Negative     Oxycodone Screen, Urine Negative     Buprenorphine, Screen, Urine Positive    Narrative:      Cutoff For Drugs Screened:    Amphetamines               500 ng/ml  Barbiturates               200 ng/ml  Benzodiazepines            150 ng/ml  Cocaine                    150 ng/ml  Methadone                  200 ng/ml  Opiates                    100 ng/ml  Phencyclidine               25 ng/ml  THC                         50 ng/ml  Methamphetamine            500 ng/ml  Tricyclic Antidepressants  300 ng/ml  Oxycodone                  100 ng/ml  Buprenorphine               10 ng/ml    The normal value for all drugs tested is negative. This report includes unconfirmed screening results, with the cutoff values listed, to be used for medical treatment purposes only.  Unconfirmed results must not be used for non-medical purposes such as employment or legal testing.  Clinical consideration should be applied to any drug of abuse test, particularly  when unconfirmed results are used.      Fentanyl, Urine - Urine, Clean Catch [[id number]]  (Normal) Collected: 11/07/23 2315    Specimen: Urine, Clean Catch Updated: 11/07/23 2333     Fentanyl, Urine Negative    Narrative:      Negative Threshold:      Fentanyl 5 ng/mL     The normal value for the drug tested is negative. This report includes final unconfirmed screening results to be used for medical treatment purposes only. Unconfirmed results must not be used for non-medical purposes such as employment or legal testing. Clinical consideration should be applied to any drug of abuse test, particularly when unconfirmed results are used.           Urinalysis With Culture If Indicated - Urine, Catheter [[id number]]  (Normal) Collected: 11/07/23 2314    Specimen: Urine, Catheter Updated: 11/07/23 2322     Color, UA Yellow     Appearance, UA Clear     pH, UA 5.5     Specific Gravity, UA 1.010     Glucose, UA Negative     Ketones, UA Negative     Bilirubin, UA Negative     Blood, UA Negative     Protein, UA Negative     Leuk Esterase, UA Negative     Nitrite, UA Negative     Urobilinogen, UA 0.2 E.U./dL    Narrative:      In absence of clinical symptoms, the presence of pyuria, bacteria, and/or nitrites on the urinalysis result does not correlate with infection.  Urine microscopic not indicated.    Procalcitonin [[id number]]  (Normal) Collected: 11/07/23 2238    Specimen: Blood Updated: 11/07/23 2308     Procalcitonin 0.09 ng/mL     Narrative:      As a Marker for Sepsis (Non-Neonates):    1. <0.5 ng/mL represents a low risk of severe sepsis and/or septic shock.  2. >2 ng/mL represents a high risk of severe sepsis and/or septic shock.    As a Marker for Lower Respiratory Tract Infections that require antibiotic therapy:    PCT on Admission    Antibiotic Therapy       6-12 Hrs later    >0.5                Strongly Recommended  >0.25 - <0.5        Recommended   0.1 - 0.25          Discouraged              Remeasure/reassess  "PCT  <0.1                Strongly Discouraged     Remeasure/reassess PCT    As 28 day mortality risk marker: \"Change in Procalcitonin Result\" (>80% or <=80%) if Day 0 (or Day 1) and Day 4 values are available. Refer to http://[email or web address]-pct-[email or web address]    Change in PCT <=80%  A decrease of PCT levels below or equal to 80% defines a positive change in PCT test result representing a higher risk for 28-day all-cause mortality of patients diagnosed with severe sepsis for septic shock.    Change in PCT >80%  A decrease of PCT levels of more than 80% defines a negative change in PCT result representing a lower risk for 28-day all-cause mortality of patients diagnosed with severe sepsis or septic shock.       High Sensitivity Troponin T [[id number]]  (Abnormal) Collected: 11/07/23 2238    Specimen: Blood Updated: 11/07/23 2303     HS Troponin T 36 ng/L     Narrative:      High Sensitive Troponin T Reference Range:  <14.0 ng/L- Negative Female for AMI  <22.0 ng/L- Negative Male for AMI  >=14 - Abnormal Female indicating possible myocardial injury.  >=22 - Abnormal Male indicating possible myocardial injury.   Clinicians would have to utilize clinical acumen, EKG, Troponin, and serial changes to determine if it is an Acute Myocardial Infarction or myocardial injury due to an underlying chronic condition.         BNP [[id number]]  (Normal) Collected: 11/07/23 2238    Specimen: Blood Updated: 11/07/23 2303     proBNP 100.9 pg/mL     Narrative:      This assay is used as an aid in the diagnosis of individuals suspected of having heart failure. It can be used as an aid in the diagnosis of acute decompensated heart failure (ADHF) in patients presenting with signs and symptoms of ADHF to the emergency department (ED). In addition, NT-proBNP of <300 pg/mL indicates ADHF is not likely.    Age Range Result Interpretation  NT-proBNP Concentration (pg/mL:      <50             Positive            >450                   Shah             "     300-450                    Negative             <300    50-75           Positive            >900                  Gray                300-900                  Negative            <300      >75             Positive            >1800                  Gray                300-1800                  Negative            <300    Comprehensive Metabolic Panel [[id number]]  (Abnormal) Collected: 11/07/23 2238    Specimen: Blood Updated: 11/07/23 2259     Glucose 115 mg/dL      BUN 31 mg/dL      Creatinine 1.40 mg/dL      Sodium 134 mmol/L      Potassium 3.7 mmol/L      Chloride 98 mmol/L      CO2 23.0 mmol/L      Calcium 8.2 mg/dL      Total Protein 7.7 g/dL      Albumin 4.1 g/dL      ALT (SGPT) 41 U/L      AST (SGOT) 40 U/L      Alkaline Phosphatase 71 U/L      Total Bilirubin 0.3 mg/dL      Globulin 3.6 gm/dL      A/G Ratio 1.1 g/dL      BUN/Creatinine Ratio 22.1     Anion Gap 13.0 mmol/L      eGFR 26.9 mL/min/1.73     Narrative:      GFR Normal >60  Chronic Kidney Disease <60  Kidney Failure <15    The GFR formula is only valid for adults with stable renal function between ages 18 and 70.    Ethanol [[id number]]  (Abnormal) Collected: 11/07/23 2238    Specimen: Blood Updated: 11/07/23 2259     Ethanol 236 mg/dL      Ethanol % 0.236 %     Narrative:      This result is for medical use only and should not be used for forensic purposes.    Magnesium [[id number]]  (Normal) Collected: 11/07/23 2238    Specimen: Blood Updated: 11/07/23 2259     Magnesium 1.8 mg/dL     CBC & Differential [[id number]]  (Abnormal) Collected: 11/07/23 2238    Specimen: Blood Updated: 11/07/23 2243    Narrative:      The following orders were created for panel order CBC & Differential.  Procedure                               Abnormality         Status                     ---------                               -----------         ------                     CBC Auto Differential[[id number]]        Abnormal            Final result                 Please  view results for these tests on the individual orders.    CBC Auto Differential [[id number]]  (Abnormal) Collected: 11/07/23 2238    Specimen: Blood Updated: 11/07/23 2243     WBC 9.65 10*3/mm3      RBC 3.86 10*6/mm3      Hemoglobin 13.9 g/dL      Hematocrit 40.5 %      .9 fL      MCH 36.0 pg      MCHC 34.3 g/dL      RDW 13.4 %      RDW-SD 51.2 fl      MPV 9.0 fL      Platelets 271 10*3/mm3      Neutrophil % 45.9 %      Lymphocyte % 38.0 %      Monocyte % 10.8 %      Eosinophil % 3.7 %      Basophil % 1.0 %      Immature Grans % 0.6 %      Neutrophils, Absolute 4.42 10*3/mm3      Lymphocytes, Absolute 3.67 10*3/mm3      Monocytes, Absolute 1.04 10*3/mm3      Eosinophils, Absolute 0.36 10*3/mm3      Basophils, Absolute 0.10 10*3/mm3      Immature Grans, Absolute 0.06 10*3/mm3      nRBC 0.2 /100 WBC           Imaging Results (Last 24 Hours)       Procedure Component Value Units Date/Time    XR Chest 1 View [[id number]] Collected: 11/08/23 0112     Updated: 11/08/23 0114    Narrative:      FINAL REPORT    TECHNIQUE:  null    CLINICAL HISTORY:  soa    COMPARISON:  null    FINDINGS:  Single view of the chest    Comparison:    None    Findings: Low lung volumes. Otherwise normal heart, lungs and mediastinum. No evidence for pneumonia.      Impression:      Impression:    Low lung volumes.    Authenticated and Electronically Signed by Chace Perdomo MD on  11/08/2023 01:12:39 AM    CT Head Without Contrast [[id number]] Collected: 11/08/23 0005     Updated: 11/08/23 0006    Narrative:      FINAL REPORT    CLINICAL HISTORY:  fall, head injury    FINDINGS:  Axial images of the head were obtained without contrast. Coronal  reformatted images were also obtained.This study was performed  with techniques to keep radiation doses as low as reasonably  achievable (ALARA). Individualized dose reduction techniques  using automated exposure control or adjustment of mA and/or kV  according to the patient''s size were employed.   There is motion  on many images.  There is no evidence of intracranial hemorrhage  or mass. The ventricular size is within normal limits. There is  no evidence of shift of the midline structures. No abnormal  extra axial fluid collection is identified. No skull abnormality  is seen on the bone window images.  There is complete  opacification of the right maxillary sinus.      Impression:      No acute intracranial abnormality.    Authenticated and Electronically Signed by Adria Garibay III, MD on 11/08/2023 12:05:11 AM    CT Cervical Spine Without Contrast [[id number]] Collected: 11/08/23 0005     Updated: 11/08/23 0006    Narrative:      FINAL REPORT    CLINICAL HISTORY:  fall    FINDINGS:  Axial CT images of the cervical spine were obtained without  contrast. Sagittal and coronal reformatted images were also  obtained. This study was performed with techniques to keep  radiation doses as low as reasonably achievable (ALARA).  Individualized dose reduction techniques using automated  exposure control or adjustment of mA and/or kV according to the  patient''s size were employed.  Motion artifact and patient body  habitus limits this study.  There is no definite evidence of  fracture or dislocation. The bony alignment is normal.  There  are moderate and severe degenerative changes, worst on the  cervical spine There is no evidence of canal stenosis. No  paraspinous soft tissue abnormality is seen. Limited images of  the upper thorax are unremarkable.      Impression:      No definite fracture or other acute bony abnormality identified.    Authenticated and Electronically Signed by Adria Garibay III, MD on 11/08/2023 12:05:18 AM          Orders (last 24 hrs)        Start     Ordered    11/10/23 0600  Document Pulse Oximetry - On Room Air / Home O2 Level  Daily      Comments: Room Air Oxygen Levels Should Only Be Measured if Patient Oxygen Needs are <4L  Home O2 Oxygen Levels Should Only Be Measured Once  Patient Within 2L of Home O2 Baseline  Reapply Oxygen if O2 Sat Drops Below 88%    11/08/23 0641    11/09/23 0700  escitalopram (LEXAPRO) tablet 10 mg  Every Morning         11/08/23 1238    11/09/23 0600  Tobacco Cessation Education  Daily      Comments: Document in Education Activity    11/08/23 0641    11/09/23 0600  Respiratory Treatment Education (MDI / Spacer / Nebulizer)  Daily      Comments: Document in Education Activity    11/08/23 0641    11/09/23 0600  COPD Education  Daily      Comments: Document in Education Activity    11/08/23 0641    11/08/23 2100  amLODIPine (NORVASC) tablet 10 mg  Nightly         11/08/23 1238    11/08/23 1845  CIWA Q12 Hours X3  Every Shift       11/08/23 0645    11/08/23 1800  carvedilol (COREG) tablet 25 mg  2 Times Daily With Meals         11/08/23 1238    11/08/23 1400  gabapentin (NEURONTIN) capsule 600 mg  Every 8 Hours Scheduled         11/08/23 1239    11/08/23 1330  lisinopril (PRINIVIL,ZESTRIL) tablet 40 mg  Daily         11/08/23 1238    11/08/23 1239  traMADol (ULTRAM) tablet 50 mg  Every 12 Hours PRN         11/08/23 1239    11/08/23 1153  ECG 12 Lead Rhythm Change  STAT         11/08/23 1153    11/08/23 1100  Sulfur Hexafluoride Microsph (LUMASON) 60.7-25 MG IV reconstituted suspension reconstituted suspension 2 mL  Once in Imaging         11/08/23 1009    11/08/23 1023  POC Glucose Once  PROCEDURE ONCE        Comments: Complete no more than 45 minutes prior to patient eating      11/08/23 1019    11/08/23 1006  Inpatient Admission  Once         11/08/23 1006    11/08/23 0930  budesonide-formoterol (SYMBICORT) 160-4.5 MCG/ACT inhaler 2 puff  2 Times Daily - RT         11/08/23 0641    11/08/23 0919  PT Consult: Eval & Treat Functional Mobility Below Baseline  Once         11/08/23 0918    11/08/23 0900  sodium chloride 0.9 % flush 10 mL  Every 12 Hours Scheduled         11/08/23 0641    11/08/23 0900  sennosides-docusate (PERICOLACE) 8.6-50 MG per tablet 2 tablet  2  Times Daily        See Hyperspace for full Linked Orders Report.    11/08/23 0641    11/08/23 0900  heparin (porcine) 5000 UNIT/ML injection 5,000 Units  Every 12 Hours Scheduled         11/08/23 0641    11/08/23 0800  Cough / Deep Breathe  Every 4 Hours       11/08/23 0641    11/08/23 0800  Vital Signs  Every 4 Hours       11/08/23 0641    11/08/23 0800  Oral Care  2 Times Daily       11/08/23 0641    11/08/23 0800  CIWA Q4 Hours X3  Every 4 Hours       11/08/23 0645    11/08/23 0730  sodium chloride 0.9 % infusion  Continuous         11/08/23 0641    11/08/23 0730  ipratropium-albuterol (DUO-NEB) nebulizer solution 3 mL  Every 6 Hours - RT         11/08/23 0641    11/08/23 0730  predniSONE (DELTASONE) tablet 40 mg  Daily With Breakfast         11/08/23 0641    11/08/23 0730  nicotine (NICODERM CQ) 21 MG/24HR patch 1 patch  Every 24 Hours         11/08/23 0641    11/08/23 0642  Reason for COPD Admission: Ongoing Smoking  Once         11/08/23 0641    11/08/23 0642  Intake & Output  Every Shift       11/08/23 0641    11/08/23 0642  Weigh Patient  Once         11/08/23 0641    11/08/23 0642  Insert Peripheral IV  Once         11/08/23 0641    11/08/23 0642  Saline Lock & Maintain IV Access  Continuous,   Status:  Canceled         11/08/23 0641    11/08/23 0642  Continuous Cardiac Monitoring  Continuous        Comments: Follow Standing Orders As Outlined in Process Instructions (Open Order Report to View Full Instructions)    11/08/23 0641    11/08/23 0642  Telemetry - Maintain IV Access  Continuous         11/08/23 0641    11/08/23 0642  Telemetry - Place Orders & Notify Provider of Results When Patient Experiences Acute Chest Pain, Dysrhythmia or Respiratory Distress  Until Discontinued         11/08/23 0641    11/08/23 0642  May Be Off Telemetry for Tests  Continuous         11/08/23 0641    11/08/23 0642  Diet: Renal Diets; Low Sodium (2-3g), Low Potassium, Low Phosphorus; Texture: Regular Texture (IDDSI 7); Fluid  Consistency: Thin (IDDSI 0)  Diet Effective Now         11/08/23 0641    11/08/23 0642  PT Consult: Eval & Treat Functional Mobility Below Baseline  Once         11/08/23 0641    11/08/23 0642  OT Consult: Eval & Treat  Once         11/08/23 0641    11/08/23 0642  Adult Transthoracic Echo Complete With Contrast if Necessary Per Protocol  Once         11/08/23 0641 11/08/23 0642  NIPPV (CPAP or BIPAP)  At Bedtime & As Needed-RT         11/08/23 0641    11/08/23 0641  sodium chloride 0.9 % flush 10 mL  As Needed         11/08/23 0641 11/08/23 0641  sodium chloride 0.9 % infusion 40 mL  As Needed         11/08/23 0641 11/08/23 0641  polyethylene glycol (MIRALAX) packet 17 g  Daily PRN        See Hyperspace for full Linked Orders Report.    11/08/23 0641 11/08/23 0641  bisacodyl (DULCOLAX) EC tablet 5 mg  Daily PRN        See Hyperspace for full Linked Orders Report.    11/08/23 0641 11/08/23 0641  bisacodyl (DULCOLAX) suppository 10 mg  Daily PRN        See Hyperspace for full Linked Orders Report.    11/08/23 0641 11/08/23 0641  nitroglycerin (NITROSTAT) SL tablet 0.4 mg  Every 5 Minutes PRN         11/08/23 0641 11/08/23 0641  melatonin tablet 5 mg  Nightly PRN         11/08/23 0641    11/08/23 0641  nicotine polacrilex (NICORETTE) gum 2 mg  Every 1 Hour PRN         11/08/23 0641    11/08/23 0641  aluminum-magnesium hydroxide-simethicone (MAALOX MAX) 400-400-40 MG/5ML suspension 15 mL  Every 6 Hours PRN         11/08/23 0641    11/08/23 0641  ondansetron (ZOFRAN) tablet 4 mg  Every 6 Hours PRN        See Hyperspace for full Linked Orders Report.    11/08/23 0641    11/08/23 0641  ondansetron (ZOFRAN) injection 4 mg  Every 6 Hours PRN        See Hyperspace for full Linked Orders Report.    11/08/23 0641 11/08/23 0641  guaiFENesin-dextromethorphan (ROBITUSSIN DM) 100-10 MG/5ML syrup 10 mL  Every 6 Hours PRN         11/08/23 0641    11/08/23 0627  NIPPV (CPAP or BIPAP)  As Needed-RT,   Status:   Canceled         11/08/23 0626    11/08/23 0554  Code Status and Medical Interventions:  Continuous         11/08/23 0555    11/08/23 0553  Initiate Observation Status  Once         11/08/23 0555    11/08/23 0537  ipratropium-albuterol (DUO-NEB) nebulizer solution 3 mL  Once         11/08/23 0521    11/08/23 0537  methylPREDNISolone sodium succinate (SOLU-Medrol) injection 125 mg  Once         11/08/23 0521    11/08/23 0410  Blood Gas, Arterial With Co-Ox  PROCEDURE ONCE         11/08/23 0409    11/08/23 0334  Blood Gas, Venous -With Co-Ox Panel: Yes  Once         11/08/23 0333    11/08/23 0302  Single High Sensitivity Troponin T  Once         11/08/23 0301    11/08/23 0255  acetaminophen (TYLENOL) tablet 650 mg  Every 6 Hours PRN         11/08/23 0255    11/08/23 0117  Blood Gas, Arterial With Co-Ox  PROCEDURE ONCE         11/08/23 0116    11/08/23 0038  High Sensitivity Troponin T 2Hr  PROCEDURE ONCE         11/07/23 2302    11/08/23 0015  Blood Gas, Arterial -With Co-Ox Panel: Yes  Once         11/08/23 0014    11/07/23 2317  Fentanyl, Urine - Urine, Clean Catch  Once         11/07/23 2316 11/07/23 2236  CT Cervical Spine Without Contrast  1 Time Imaging         11/07/23 2235 11/07/23 2235  CT Head Without Contrast  1 Time Imaging         11/07/23 2235 11/07/23 2235  Urinalysis With Culture If Indicated - Urine, Catheter  Once         11/07/23 2235    11/07/23 2235  High Sensitivity Troponin T  Once         11/07/23 2235 11/07/23 2235  Blood Gas, Arterial -With Co-Ox Panel: Yes  Once         11/07/23 2235 11/07/23 2235  Procalcitonin  Once         11/07/23 2235 11/07/23 2235  Ethanol  Once         11/07/23 2235 11/07/23 2235  Urine Drug Screen - Urine, Clean Catch  Once         11/07/23 2235 11/07/23 2235  Magnesium  Once         11/07/23 2235    11/07/23 2235  BNP  Once         11/07/23 2235 11/07/23 2235  ECG 12 Lead Altered Mental Status  Once         11/07/23 2235 11/07/23 2235   XR Chest 1 View  1 Time Imaging         23  CBC & Differential  Once         23  Comprehensive Metabolic Panel  Once         23  CBC Auto Differential  PROCEDURE ONCE         23 0000  carvedilol (COREG) 25 MG tablet  2 Times Daily With Meals         23 0000  escitalopram (LEXAPRO) 20 MG tablet  Every Morning         11/08/23 1114    10/31/23 0000  predniSONE (DELTASONE) 10 MG tablet  Daily         11/08/23 1114    10/23/23 0000  gabapentin (NEURONTIN) 600 MG tablet  3 Times Daily         11/08/23 1114    10/23/23 0000  torsemide (DEMADEX) 10 MG tablet  Daily         11/08/23 1114    10/21/23 0000  methotrexate 2.5 MG tablet  Weekly         23 1114    10/20/23 0000  folic acid (FOLVITE) 1 MG tablet  Daily         11/08/23 1114    10/20/23 0000  hydroxychloroquine (PLAQUENIL) 200 MG tablet  Daily         11/08/23 1114    09/15/23 0000  amLODIPine (NORVASC) 10 MG tablet  Daily         23 0000  traMADol (ULTRAM) 50 MG tablet  Every 12 Hours PRN         23 0000  escitalopram (LEXAPRO) 10 MG tablet  Every Morning         23 0000  chlorthalidone (HYGROTON) 25 MG tablet  Daily         23 0000  lisinopril (PRINIVIL,ZESTRIL) 40 MG tablet  Daily         23    --  SCANNED - TELEMETRY           23 0000                     Physician Progress Notes (last 24 hours)        Maylin Pham APRN at 23 1000              [facility]    Name:  Dharmesh Samuel   Age:  52 y.o.  Sex:  male  :  1970  MRN:  [id number]   Visit Number:  [id number]  Admission Date:  2023  Date Of Service:  23  Primary Care Physician:  Michelle Sky APRN     LOS: 0 days :    Chief Complaint:      Confusion/ Hypoxia    Subjective:    Patient seen and  examined at bedside this morning.  He is getting echo at time of exam.  States he was at his neighbor's house last night drinking more than his usual and fell.  Does not remember getting to the hospital.  States he always hurts all over.  States his wife left yesterday.  Advises he usually drinks about 4 cups of vodka/ OJ nightly and has for many years, sometimes more.  States he is disabled.  States he smokes about 1PPD and has for 30+years.  States he does have Cpap for MADELAINE, however, only uses 1-2x/ week as he doesn't like it.  Denies shortness of breath, but states that his chronic pain takes his breath often.     Hospital Course:    The patient is an apparent 52-year-old with a medical history of MADELAINE, tobacco use, chronic pain, chronic kidney disease stage III, hypertension, obesity, alcohol use, who presented from home via EMS with apparently multiple complaints.  History obtained from ER record, provider, patient.  Per report, EMS had been called out after the patient apparently had had a fall and hit his head on a railing at home.  Unsure if he lost consciousness.  He had apparently appeared intoxicated upon arrival and was brought to the emergency room.  He was also noted to be hypoxic per report.  At time of my visit, the patient was awake, alert, able to follow commands.  He was unaware of how he got to the hospital.  When asked about alcohol use, he notes he does drink quite frequently at nighttime.  He also admitted to having sleep apnea and supposed to use a CPAP/BiPAP but does not always.  He is unsure of where he was at tonight prior to coming in.  He has had some shortness of breath but denies any chest pain, fever, cough.  He was asking if he can go outside and smoke a cigarette.     In the ER, patient's blood pressure was stable.  He is temperature was 97 his heart rate was in the 60 range.  He was initially on nonrebreather mask and placed on BiPAP.  Labs with white count 9 hemoglobin 13 platelets  271.  proBNP of 100.  Ethanol of 0.236.  Procalcitonin 0.09.  Creatinine was 1.4.  Urinalysis unremarkable.  UDS positive buprenorphine.  Initial troponin did elevate on repeat test, however decreased on a third troponin.  ABG notable for pH 7.210 PCO2 of 62.  CT of the cervical spine was unremarkable.  CT scan of the head without contrast was unremarkable.  Chest x-ray appeared to show some atelectasis otherwise no acute abnormality.  The patient was monitored in the emergency room for multiple hours, so oxygen had been weaned off a couple times, however he continued to show hypoxia and will drop into the mid upper 80s.  We were subsequent asked admit thereafter.    Review of Systems:     All systems were reviewed and negative except as mentioned in subjective, assessment and plan.    Vital Signs:    Temp:  [96.5 °F (35.8 °C)] 96.5 °F (35.8 °C)  Heart Rate:  [49-68] 50  Resp:  [14-20] 20  BP: ()/(58-84) 106/84    Intake and output:    No intake/output data recorded.  No intake/output data recorded.    Physical Examination:    General Appearance:  Alert and cooperative, middle aged male, no acute distress on exam, obese with BMI-40, appears chronically ill   Head:  Atraumatic and normocephalic.   Eyes: Conjunctivae and sclerae normal, no icterus. No pallor.   Throat: No oral lesions, no thrush, oral mucosa moist.   Neck: Supple, trachea midline   Lungs:   Breath sounds heard bilaterally equally but diminished throughout.  No wheezing or crackles. Unlabored with stable sats on 2L   Heart:  Normal S1 and S2, no murmur, no gallop, no rub. No JVD.   Abdomen:   Normal bowel sounds, no masses, no organomegaly. Soft, nontender, nondistended, no rebound tenderness.   Extremities: Supple, no edema, no cyanosis, no clubbing.   Skin: No bleeding or rash.   Neurologic: Alert and oriented x 3. No facial asymmetry. Moves all four limbs. No tremors. Generalized weakness is present.     Laboratory results:    Results from  last 7 days   Lab Units 11/07/23 2238   SODIUM mmol/L 134*   POTASSIUM mmol/L 3.7   CHLORIDE mmol/L 98   CO2 mmol/L 23.0   BUN mg/dL 31*   CREATININE mg/dL 1.40*   CALCIUM mg/dL 8.2   BILIRUBIN mg/dL 0.3   ALK PHOS U/L 71   ALT (SGPT) U/L 41   AST (SGOT) U/L 40   GLUCOSE mg/dL 115*     Results from last 7 days   Lab Units 11/07/23 2238   WBC 10*3/mm3 9.65   HEMOGLOBIN g/dL 13.9   HEMATOCRIT % 40.5   PLATELETS 10*3/mm3 271         Results from last 7 days   Lab Units 11/08/23  0312 11/08/23  0104 11/07/23 2238   HSTROP T ng/L 47* 54* 36*         Recent Labs     11/08/23  0116 11/08/23  0409   PHART 7.210* 7.253*   ZAK8QDM 62.8* 52.1*   PO2ART 79.1 45.9*   OMR8TBC 25.1 23.0   BASEEXCESS -4.1* -4.6*      I have reviewed the patient's laboratory results.    Radiology results:    Adult Transthoracic Echo Complete With Contrast if Necessary Per Protocol    Result Date: 11/8/2023    Left ventricular systolic function is hyperdynamic (EF > 70%). Estimated left ventricular EF = 77% Left ventricular ejection fraction appears to be greater than 70%.   Left ventricular diastolic function was normal.     XR Chest 1 View    Result Date: 11/8/2023  FINAL REPORT TECHNIQUE: null CLINICAL HISTORY: soa COMPARISON: null FINDINGS: Single view of the chest Comparison: None Findings: Low lung volumes. Otherwise normal heart, lungs and mediastinum. No evidence for pneumonia.     Impression: Impression: Low lung volumes. Authenticated and Electronically Signed by Chace Perdomo MD on 11/08/2023 01:12:39 AM    CT Cervical Spine Without Contrast    Result Date: 11/8/2023  FINAL REPORT CLINICAL HISTORY: fall FINDINGS: Axial CT images of the cervical spine were obtained without contrast. Sagittal and coronal reformatted images were also obtained. This study was performed with techniques to keep radiation doses as low as reasonably achievable (ALARA). Individualized dose reduction techniques using automated exposure control or adjustment of  mA and/or kV according to the patient''s size were employed.  Motion artifact and patient body habitus limits this study.  There is no definite evidence of fracture or dislocation. The bony alignment is normal.  There are moderate and severe degenerative changes, worst on the cervical spine There is no evidence of canal stenosis. No paraspinous soft tissue abnormality is seen. Limited images of the upper thorax are unremarkable.     Impression: No definite fracture or other acute bony abnormality identified. Authenticated and Electronically Signed by Adria Garibay III, MD on 11/08/2023 12:05:18 AM    CT Head Without Contrast    Result Date: 11/8/2023  FINAL REPORT CLINICAL HISTORY: fall, head injury FINDINGS: Axial images of the head were obtained without contrast. Coronal reformatted images were also obtained.This study was performed with techniques to keep radiation doses as low as reasonably achievable (ALARA). Individualized dose reduction techniques using automated exposure control or adjustment of mA and/or kV according to the patient''s size were employed.  There is motion on many images.  There is no evidence of intracranial hemorrhage or mass. The ventricular size is within normal limits. There is no evidence of shift of the midline structures. No abnormal extra axial fluid collection is identified. No skull abnormality is seen on the bone window images.  There is complete opacification of the right maxillary sinus.     Impression: No acute intracranial abnormality. Authenticated and Electronically Signed by Adria Garibay III, MD on 11/08/2023 12:05:11 AM   I have reviewed the patient's radiology reports.    Medication Review:     I have reviewed the patient's active and prn medications.     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    -He is also on multiple sedating medications at home including gabapentin, tramadol, and was noted to be positive for buprenorphine in UDS.    -Continue steroids and bronchodilators for now.    -Does not appear to be overloaded or have aspirated.    -No indication for antibiotics.    -Wean oxygen as tolerated.    -Continue with BiPAP.     Elevated troponin:  -Suspect type II demand related in setting of hypercapnia and hypoxia.  -Monitor on telemetry.    -2D echo noted left systolic function hyperdynamic with EF-77% and diastolic function normal     Reported fall:  -Patient unaware of fall tonight per his report, but notes he has had multiple falls which he attributes to knee issues due to his arthritis.  He does have some mild abrasions on the hand, appears otherwise have normal range of motion of the extremities.  Doubt he had true syncopal episode, more likely chronic debility with alcohol abuse     AMS:  -Suspect related to alcohol intoxication and hypercapnia.  This is already improved and he is back to baseline.      CKD 3:  -Numbers overall appear stable close to baseline.    -Follows with nephrology.    Chronic  -Will restart chronic Gabapentin/ Tramadol at lower frequency  -Nicotine patch  -CIWA monitoring per nursing  -Smoking cessation  -Alcohol cessation    DVT Prophylaxis: Heparin  Code Status: Full Code  Diet: Renal  Discharge Plan: 1-2 days    AMY Che  11/08/23  10:47 EST    Dictated utilizing Dragon dictation.      Electronically signed by Maylin Pham APRN at  11/08/23 1058       Consult Notes (last 24 hours)  Notes from 11/07/23 1356 through 11/08/23 1356   No notes of this type exist for this encounter.

## 2023-11-08 NOTE — THERAPY EVALUATION
Patient Name: Dharmesh Samuel  : 1970    MRN: [id number]                              Today's Date: 2023       Admit Date: 2023    Visit Dx:     ICD-10-CM ICD-9-CM   1. Acute respiratory failure with hypoxia and hypercapnia  J96.01 518.81    J96.02    2. Alcoholic intoxication without complication  F10.920 305.00   3. Injury of head, initial encounter  S09.90XA 959.01     Patient Active Problem List   Diagnosis    Acute respiratory failure with hypoxia and hypercapnia    COPD exacerbation    Acute respiratory failure with hypoxia     Past Medical History:   Diagnosis Date    Impaired mobility      History reviewed. No pertinent surgical history.   General Information       Row Name 23 1440          Physical Therapy Time and Intention    Document Type evaluation  -MS     Mode of Treatment physical therapy  -MS       Row Name 23 1440          General Information    Patient Profile Reviewed yes  -MS     Prior Level of Function independent:;all household mobility;community mobility  -MS     Existing Precautions/Restrictions fall  -MS     Barriers to Rehab medically complex;previous functional deficit;ineffective coping;environmental barriers  -MS       Row Name 23 1440          Living Environment    People in Home child(biju), dependent;spouse  -MS       Row Name 23 1440          Home Main Entrance    Number of Stairs, Main Entrance one  -MS       Row Name 23 1440          Stairs Within Home, Primary    Number of Stairs, Within Home, Primary none  -MS       Row Name 23 1440          Cognition    Orientation Status (Cognition) oriented x 4  -MS       Row Name 23 1440          Safety Issues, Functional Mobility    Safety Issues Affecting Function (Mobility) safety precautions follow-through/compliance;insight into deficits/self-awareness;judgment;safety precaution awareness;impulsivity;sequencing abilities;awareness of need for assistance  -MS     Impairments  Affecting Function (Mobility) balance;endurance/activity tolerance;pain;postural/trunk control;strength  -MS               User Key  (r) = Recorded By, (t) = Taken By, (c) = Cosigned By      Initials Name Provider Type    Giancarlo Skaggs PT Physical Therapist                   Mobility       Row Name 11/08/23 1440          Bed Mobility    Bed Mobility supine-sit  -MS     Supine-Sit Drifting (Bed Mobility) standby assist  -MS     Assistive Device (Bed Mobility) bed rails;head of bed elevated  -MS       Row Name 11/08/23 1440          Sit-Stand Transfer    Sit-Stand Drifting (Transfers) standby assist  -MS     Assistive Device (Sit-Stand Transfers) walker, front-wheeled;bariatric  -MS       Row Name 11/08/23 1440          Gait/Stairs (Locomotion)    Drifting Level (Gait) contact guard  -MS     Assistive Device (Gait) walker, front-wheeled;bariatric equipment  -MS     Distance in Feet (Gait) 7  -MS     Deviations/Abnormal Patterns (Gait) festinating/shuffling;gait speed decreased;base of support, wide  -MS               User Key  (r) = Recorded By, (t) = Taken By, (c) = Cosigned By      Initials Name Provider Type    Giancarlo Skaggs PT Physical Therapist                   Obj/Interventions       Row Name 11/08/23 1441          Range of Motion Comprehensive    General Range of Motion bilateral lower extremity ROM WFL  -MS       Row Name 11/08/23 1441          Strength Comprehensive (MMT)    General Manual Muscle Testing (MMT) Assessment lower extremity strength deficits identified  -MS     Comment, General Manual Muscle Testing (MMT) Assessment L knee arthritis, reports knee buckling, MMT not performed d/t pain  -MS       Row Name 11/08/23 1441          Sensory Assessment (Somatosensory)    Sensory Assessment (Somatosensory) sensation intact  -MS               User Key  (r) = Recorded By, (t) = Taken By, (c) = Cosigned By      Initials Name Provider Type    Giancarlo Skaggs, GABRIELLA Physical  Therapist                   Goals/Plan       Row Name 11/08/23 1448          Bed Mobility Goal 1 (PT)    Activity/Assistive Device (Bed Mobility Goal 1, PT) bed mobility activities, all  -MS     [place] Level/Cues Needed (Bed Mobility Goal 1, PT) modified independence  -MS     Time Frame (Bed Mobility Goal 1, PT) long term goal (LTG);2 weeks  -MS       Row Name 11/08/23 1448          Gait Training Goal 1 (PT)    Activity/Assistive Device (Gait Training Goal 1, PT) gait (walking locomotion);assistive device use  -MS     [place] Level (Gait Training Goal 1, PT) modified independence  -MS     Distance (Gait Training Goal 1, PT) 150ft  -MS     Time Frame (Gait Training Goal 1, PT) long term goal (LTG);2 weeks  -MS       Row Name 11/08/23 1448          Patient Education Goal (PT)    Activity (Patient Education Goal, PT) ther exer x15 reps ea  -MS     [place]/Cues/Accuracy (Memory Goal 2, PT) demonstrates adequately  -MS     Time Frame (Patient Education Goal, PT) long term goal (LTG);2 weeks  -MS       Row Name 11/08/23 1448          Therapy Assessment/Plan (PT)    Planned Therapy Interventions (PT) balance training;bed mobility training;home exercise program;gait training;transfer training;postural re-education;ROM (range of motion);stair training;strengthening;patient/family education  -MS               User Key  (r) = Recorded By, (t) = Taken By, (c) = Cosigned By      Initials Name Provider Type    MS Giancarlo Payne, PT Physical Therapist                   Clinical Impression       Row Name 11/08/23 1442          Pain    Pretreatment Pain Rating 10/10  -MS     Posttreatment Pain Rating 10/10  -MS     Pain Location generalized  -MS     Pain Intervention(s) Repositioned;Ambulation/increased activity  -MS       Row Name 11/08/23 1442          Plan of Care Review    Plan of Care Reviewed With patient  -MS     Progress no change  -MS     Outcome Evaluation Pt participated in PT [facility] [place]. Pt  receieved in supine, initially hesistance to participated in PT, however stated he needed to use the restroom. Pt t/f'd to EOB with SBA. able to maintain balance at OEB. Pt reported chronic pain that limits mobility. Pt required assist with donning socks. Pt thenstood with CGA from EOB. cues for hand placemetn and use of Rwx. In standing, Pt reported dizziness that did not subside with minutes of standing. Pt requesting to snow restroom, attempted 7 ft of gait but had to return to sitting EOB due to dizziness. BP in sitting taken 174/106. RN notified. Pt left sitting at EOB. Pt would benefit from cont skilled PTx during this inpatient stay and HHPT at disch.  -MS       Row Name 11/08/23 1442          Therapy Assessment/Plan (PT)    Patient/Family Therapy Goals Statement (PT) to go home  -MS     Rehab Potential (PT) good, to achieve stated therapy goals  -MS     Criteria for Skilled Interventions Met (PT) yes;meets criteria  -MS     Therapy Frequency (PT) 5 times/wk  -MS       Row Name 11/08/23 1442          Vital Signs    O2 Delivery Pre Treatment room air  -MS     O2 Delivery Intra Treatment room air  -MS     O2 Delivery Post Treatment room air  -MS     Pre Patient Position Supine  -MS     Intra Patient Position Standing  -MS     Post Patient Position Sitting  -MS       Row Name 11/08/23 1442          Positioning and Restraints    Pre-Treatment Position in bed  -MS     Post Treatment Position bed  -MS     In Bed sitting;call light within reach;exit alarm on;encouraged to call for assist;notified nsg  -MS               User Key  (r) = Recorded By, (t) = Taken By, (c) = Cosigned By      Initials Name Provider Type    MS Giancarlo Payne, PT Physical Therapist                   Outcome Measures       Row Name 11/08/23 1449 11/08/23 0800       How much help from another person do you currently need...    Turning from your back to your side while in flat bed without using bedrails? 4  -MS 2  -RP    Moving from lying on  back to sitting on the side of a flat bed without bedrails? 4  -MS 2  -RP    Moving to and from a bed to a chair (including a wheelchair)? 3  -MS 2  -RP    Standing up from a chair using your arms (e.g., wheelchair, bedside chair)? 3  -MS 2  -RP    Climbing 3-5 steps with a railing? 3  -MS 2  -RP    To walk in hospital room? 3  -MS 2  -RP    AM-PAC 6 Clicks Score (PT) 20  -MS 12  -RP    Highest level of mobility 6 --> Walked 10 steps or more  -MS 4 --> Transferred to chair/commode  -RP      Row Name 11/08/23 0650          How much help from another person do you currently need...    Turning from your back to your side while in flat bed without using bedrails? 2  -BD     Moving from lying on back to sitting on the side of a flat bed without bedrails? 2  -BD     Moving to and from a bed to a chair (including a wheelchair)? 2  -BD     Standing up from a chair using your arms (e.g., wheelchair, bedside chair)? 2  -BD     Climbing 3-5 steps with a railing? 2  -BD     To walk in hospital room? 2  -BD     AM-PAC 6 Clicks Score (PT) 12  -BD     Highest level of mobility 4 --> Transferred to chair/commode  -BD       Row Name 11/08/23 1351          Functional Assessment    Outcome Measure Options AM-PAC 6 Clicks Daily Activity (OT)  -SD               User Key  (r) = Recorded By, (t) = Taken By, (c) = Cosigned By      Initials Name Provider Type    Marilyn Dela Cruz OT Occupational Therapist    Giancarlo Skaggs, PT Physical Therapist    Radha Duran, RN Registered Nurse    Fannie Ríos, RN Registered Nurse                                 Physical Therapy Education       Title: PT OT SLP Therapies (In Progress)       Topic: Physical Therapy (In Progress)       Point: Mobility training (Done)       Learning Progress Summary             Patient Acceptance, E VU by MS at 11/8/2023 1781    Comment: importance of mobility                         Point: Home exercise program (Done)       Learning Progress Summary              Patient Acceptance, E, VU by MS at 11/8/2023 0625    Comment: importance of mobility                         Point: Body mechanics (Not Started)       Learner Progress:  Not documented in this visit.              Point: Precautions (Not Started)       Learner Progress:  Not documented in this visit.                              User Key       Initials Effective Dates Name Provider Type Discipline    MS 08/22/23 -  Giancarlo Payne, PT Physical Therapist PT                  PT Recommendation and Plan  Planned Therapy Interventions (PT): balance training, bed mobility training, home exercise program, gait training, transfer training, postural re-education, ROM (range of motion), stair training, strengthening, patient/family education  Plan of Care Reviewed With: patient  Progress: no change  Outcome Evaluation: Pt participated in PT eval thisd ate. Pt receieved in supine, initially hesistance to participated in PT, however stated he needed to use the restroom. Pt t/f'd to EOB with SBA. able to maintain balance at OEB. Pt reported chronic pain that limits mobility. Pt required assist with donning socks. Pt thenstood with CGA from EOB. cues for hand placemetn and use of Rwx. In standing, Pt reported dizziness that did not subside with minutes of standing. Pt requesting to snow restroom, attempted 7 ft of gait but had to return to sitting EOB due to dizziness. BP in sitting taken 174/106. RN notified. Pt left sitting at EOB. Pt would benefit from cont skilled PTx during this inpatient stay and HHPT at disch.     Time Calculation:   PT Evaluation Complexity  History, PT Evaluation Complexity: 1-2 personal factors and/or comorbidities  Examination of Body Systems (PT Eval Complexity): total of 3 or more elements  Clinical Presentation (PT Evaluation Complexity): evolving  Clinical Decision Making (PT Evaluation Complexity): moderate complexity  Overall Complexity (PT Evaluation Complexity): moderate  complexity     PT Charges       Row Name 11/08/23 1451             Time Calculation    Start Time 1111  -MS      PT Received On 11/08/23  -MS      PT Goal Re-Cert Due Date 11/18/23  -MS         Untimed Charges    PT Eval/Re-eval Minutes 55  -MS         Total Minutes    Untimed Charges Total Minutes 55  -MS       Total Minutes 55  -MS                User Key  (r) = Recorded By, (t) = Taken By, (c) = Cosigned By      Initials Name Provider Type    MS Giancarlo Payne, PT Physical Therapist                  Therapy Charges for Today       Code Description Service Date Service Provider Modifiers Qty    13939966962 HC PT EVAL MOD COMPLEXITY 4 11/8/2023 Giancarlo Payne, PT GP 1            PT G-Codes  Outcome Measure Options: AM-PAC 6 Clicks Daily Activity (OT)  AM-PAC 6 Clicks Score (PT): 20  AM-PAC 6 Clicks Score (OT): 18  PT Discharge Summary  Anticipated Discharge Disposition (PT): home with assist, home with home health    Giancarlo Payne PT  11/8/2023

## 2023-11-08 NOTE — ED PROVIDER NOTES
TRIAGE CHIEF COMPLAINT:     Nursing and triage notes reviewed    Chief Complaint   Patient presents with    Head Injury      HPI: Lourdes Hernandez is a 89 y.o. male who presents to the emergency department complaining of altered mental status, intoxication, head injury.  Patient is brought in by EMS after having fell and hit the back of his head on a wood railing.  Unknown loss of consciousness.  Patient had reported large amount of alcohol throughout the day.  EMS report that patient has had a GCS of around 11-12 in route to the hospital and was found to be hypoxic.  History is somewhat limited.    REVIEW OF SYSTEMS: All other systems reviewed and are negative     PAST MEDICAL HISTORY:   No past medical history on file.     FAMILY HISTORY:   No family history on file.     SOCIAL HISTORY:         SURGICAL HISTORY:   No past surgical history on file.     CURRENT MEDICATIONS:      Medication List      You have not been prescribed any medications.          ALLERGIES: Patient has no allergy information on record.     PHYSICAL EXAM:   VITAL SIGNS:   Vitals:    11/07/23 2232   BP: 128/71   Pulse: 68   Resp: 14   SpO2: 92%      CONSTITUTIONAL: Asleep but will awaken to sternal rub  HENT: Posterior scalp abrasions with no deep laceration, oral mucosa pink and moist, airway patent. Nares patent without drainage. External ears normal.   EYES: Conjunctivae clear, EOMI, PERRL   NECK: Trachea midline, no obvious tenderness  CARDIOVASCULAR: Normal heart rate, Normal rhythm, No murmurs, rubs, gallops   PULMONARY/CHEST: Scattered coarse lung sounds throughout.  Snoring respirations.  ABDOMINAL: Nondistended, soft, nontender - no rebound or guarding.   NEUROLOGIC: Nonfocal, moving all four extremities, no gross sensory or motor deficits.   EXTREMITIES: No clubbing, cyanosis, or edema   SKIN: Warm, Dry, No erythema, No rash     ED COURSE / MEDICAL DECISION MAKING:   Lourdes Hernandez is a 89 y.o. male who presents to the emergency  department for evaluation of altered mental status and head injury.  Patient is arousable to sternal rub upon arrival.  He arrives on a nonrebreather mask.    Differential diagnosis includes head injury, alcohol intoxication, respiratory illness or compromise among other etiologies.    CT scan of the head, chest x-ray, EKG, CBC, CMP, alcohol level, cardiac enzymes, ABG was ordered for further evaluation of the patient's presentation.    Diagnostic information from other sources: EMS, chart review    Interventions: Oxygen therapy, BiPAP    EKG interpreted by me reveals sinus rhythm with rate of 73 bpm.  There is sinus arrhythmia.  There is low QRS voltage.  This is an atypical appearing EKG.    Narrative: Presents with altered mental status.  Patient does appear to be extremely intoxicated on arrival and I believe this to be the main cause of his symptoms.  Laboratory testing initially reveals significant acidosis with a CO2 in the 80s with a pH of 7.0.  Ethanol also elevated at 236.  Other labs largely around baseline aside from cardiac enzymes that are elevated at 36 and then 54 and 47.  Patient was placed on BiPAP therapy.  Chest x-ray per my interpretation does not reveal obvious acute process.  Patient given a breathing treatment due to some mild wheezes.  Repeat blood gases did show improvement.  Patient had slow improvement in symptoms and became more awake and alert.  CT scan of the head and cervical spine per radiology interpretation did not reveal obvious acute abnormalities.  Patient did continue to remain somewhat hypoxic.  Will admit to the hospital for further treatment and evaluation.  Hospitalist agreed to accept the patient.    Re-evaluation: Patient more awake and alert    Plan for disposition is admission    DECISION TO DISCHARGE/ADMIT: see ED care timeline     FINAL IMPRESSION:   1 --respiratory failure with hypoxia and hypercapnia  2 --alcohol intoxication  3 --head injury    Electronically  signed by: Gladys Peres MD, 11/7/2023 23:02 Gladys Bacon MD  11/08/23 0556

## 2023-11-08 NOTE — PAYOR COMM NOTE
"TO:BC  FROM:GRACE MENDOZA, RN PHONE [phone]  AUTH NUMBER # [id number]  Dharmesh Cary (52 y.o. Male)       Date of Birth   1970    Social Security Number       Address   [address] [place] [address]    Home Phone   [phone]    MRN   [id number]       Mosque   None    Marital Status                               Admission Date   23    Admission Type   Emergency    Admitting Provider   Lisa Dias DO    Attending Provider   Dre Salas MD    Department, Room/Bed   [facility] TELEMETRY SDS OVERFLOW, T03       Discharge Date       Discharge Disposition       Discharge Destination                                 Attending Provider: Dre Salas MD    Allergies: Not on File    Isolation: None   Infection: None   Code Status: CPR    Ht: 183 cm (72.05\")   Wt: 134 kg (295 lb 6.7 oz)    Admission Cmt: None   Principal Problem: Acute respiratory failure with hypoxia and hypercapnia [J96.01,J96.02]                   Active Insurance as of 2023       Primary Coverage       Payor Plan Insurance Group Employer/Plan Group    [employer]OSS [employer]UE [employer]OSS BLUE [employer]IELD PPO [id number]       Payor Plan Address Payor Plan Phone Number Payor Plan Fax Number Effective Dates    [address] [phone]  10/11/2023 - None Entered    [place] [address]         Subscriber Name Subscriber Birth Date Member ID       LAVONNE CARY 1979 [id number]                     Emergency Contacts        (Rel.) Home Phone Work Phone Mobile Phone    Lavonne Cary (Spouse) [phone] -- --                 History & Physical        Lisa Dias DO at 23 0541            [facility]   HISTORY AND PHYSICAL      Name:  Dharmesh Cary   Age:  52 y.o.  Sex:  male  :  1970  MRN:  [id number]   Visit Number:  [id number]  Admission Date:  2023  Date Of Service:  23  Primary Care " Physician:  Michelle Sky APRN    Chief Complaint:     Confusion, low oxygen    History Of Presenting Illness:      The patient is an apparent 52-year-old with a medical history of MADELAINE, tobacco use, chronic pain, chronic kidney disease stage III, hypertension, obesity, alcohol use, who presented from home via EMS with apparently multiple complaints.  History obtained from ER record, provider, patient.  Per report, EMS had been called out after the patient apparently had had a fall and hit his head on a railing at home.  Unsure if he lost consciousness.  He had apparently appeared intoxicated upon arrival and was brought to the emergency room.  He was also noted to be hypoxic per report.  At time of my visit, the patient was awake, alert, able to follow commands.  He was unaware of how he got to the hospital.  When asked about alcohol use, he notes he does drink quite frequently at nighttime.  He also admitted to having sleep apnea and supposed to use a CPAP/BiPAP but does not always.  He is unsure of where he was at tonight prior to coming in.  He has had some shortness of breath but denies any chest pain, fever, cough.  He was asking if he can go outside and smoke a cigarette.    In the ER, patient's blood pressure was stable.  He is temperature was 97 his heart rate was in the 60 range.  He was initially on nonrebreather mask and placed on BiPAP.  Labs with white count 9 hemoglobin 13 platelets 271.  proBNP of 100.  Ethanol of 0.236.  Procalcitonin 0.09.  Creatinine was 1.4.  Urinalysis unremarkable.  UDS positive buprenorphine.  Initial troponin did elevate on repeat test, however decreased on a third troponin.  ABG notable for pH 7.210 PCO2 of 62.  CT of the cervical spine was unremarkable.  CT scan of the head without contrast was unremarkable.  Chest x-ray appeared to show some atelectasis otherwise no acute abnormality.  The patient was monitored in the emergency room for multiple hours, so oxygen had  "been weaned off a couple times, however he continued to show hypoxia and will drop into the mid upper 80s.  We were subsequent asked admit thereafter.    Review Of Systems:    All systems were reviewed and negative except as mentioned in history of presenting illness, assessment and plan.    Past Medical History: Patient  has no past medical history on file.    Past Surgical History: Patient  has no past surgical history on file.    Social History: Patient      Family History:  Patient's family history has been reviewed and found to be noncontributory.     Allergies:      Patient has no allergy information on record.    Home Medications:    Prior to Admission Medications       None          ED Medications:    Medications   acetaminophen (TYLENOL) tablet 650 mg (650 mg Oral Given 11/8/23 0310)   ipratropium-albuterol (DUO-NEB) nebulizer solution 3 mL (has no administration in time range)   methylPREDNISolone sodium succinate (SOLU-Medrol) injection 125 mg (has no administration in time range)     Vital Signs:  Temp:  [96.5 °F (35.8 °C)] 96.5 °F (35.8 °C)  Heart Rate:  [49-68] 51  Resp:  [14] 14  BP: ()/(58-76) 107/71        11/07/23  2232   Weight: 136 kg (300 lb)     Body mass index is 40.69 kg/m².    Physical Exam:     Most recent vital Signs: /71   Pulse 51   Temp 96.5 °F (35.8 °C) (Axillary)   Resp 14   Ht 182.9 cm (72\")   Wt 136 kg (300 lb)   SpO2 97%   BMI 40.69 kg/m²     Physical Exam  Constitutional:       Appearance: He is obese. He is not toxic-appearing or diaphoretic.   Eyes:      Extraocular Movements: Extraocular movements intact.      Conjunctiva/sclera: Conjunctivae normal.      Pupils: Pupils are equal, round, and reactive to light.   Cardiovascular:      Rate and Rhythm: Normal rate and regular rhythm.      Pulses: Normal pulses.      Heart sounds: No murmur heard.     No gallop.   Pulmonary:      Breath sounds: No stridor. Wheezing present. No rhonchi or rales.      Comments: " Mildly tachypneic at rest  Abdominal:      General: Abdomen is flat. Bowel sounds are normal. There is no distension.      Tenderness: There is no abdominal tenderness. There is no guarding.   Musculoskeletal:      Right lower leg: No edema.      Left lower leg: No edema.   Skin:     General: Skin is warm.      Capillary Refill: Capillary refill takes less than 2 seconds.      Findings: No lesion or rash.   Neurological:      General: No focal deficit present.      Mental Status: He is alert and oriented to person, place, and time. Mental status is at baseline.      Motor: Weakness present.   Psychiatric:         Mood and Affect: Mood normal.         Thought Content: Thought content normal.         Laboratory data:    I have reviewed the labs done in the emergency room.    Results from last 7 days   Lab Units 11/07/23  2238   SODIUM mmol/L 134*   POTASSIUM mmol/L 3.7   CHLORIDE mmol/L 98   CO2 mmol/L 23.0   BUN mg/dL 31*   CREATININE mg/dL 1.40*   CALCIUM mg/dL 8.2   BILIRUBIN mg/dL 0.3   ALK PHOS U/L 71   ALT (SGPT) U/L 41   AST (SGOT) U/L 40   GLUCOSE mg/dL 115*     Results from last 7 days   Lab Units 11/07/23  2238   WBC 10*3/mm3 9.65   HEMOGLOBIN g/dL 13.9   HEMATOCRIT % 40.5   PLATELETS 10*3/mm3 271         Results from last 7 days   Lab Units 11/08/23  0312 11/08/23  0104 11/07/23  2238   HSTROP T ng/L 47* 54* 36*     Results from last 7 days   Lab Units 11/07/23  2238   PROBNP pg/mL 100.9             Results from last 7 days   Lab Units 11/08/23  0409   PH, ARTERIAL pH units 7.253*   PO2 ART mm Hg 45.9*   PCO2, ARTERIAL mm Hg 52.1*   HCO3 ART mmol/L 23.0     Results from last 7 days   Lab Units 11/07/23  2314   COLOR UA  Yellow   GLUCOSE UA  Negative   KETONES UA  Negative   BLOOD UA  Negative   LEUKOCYTES UA  Negative   PH, URINE  5.5   BILIRUBIN UA  Negative   UROBILINOGEN UA  0.2 E.U./dL       Pain Management Panel          Latest Ref Rng & Units 11/7/2023   Pain Management Panel   Amphetamine, Urine Qual  Negative Negative    Barbiturates Screen, Urine Negative Negative    Benzodiazepine Screen, Urine Negative Negative    Buprenorphine, Screen, Urine Negative Positive    Cocaine Screen, Urine Negative Negative    Fentanyl, Urine Negative Negative    Methadone Screen , Urine Negative Negative    Methamphetamine, Ur Negative Negative        EKG:      Appear to be a sinus rhythm with a normal rate.  No acute ST elevation or T wave changes.    Radiology:    XR Chest 1 View    Result Date: 11/8/2023  FINAL REPORT TECHNIQUE: null CLINICAL HISTORY: soa COMPARISON: null FINDINGS: Single view of the chest Comparison: None Findings: Low lung volumes. Otherwise normal heart, lungs and mediastinum. No evidence for pneumonia.     Impression: Low lung volumes. Authenticated and Electronically Signed by Chace Perdomo MD on 11/08/2023 01:12:39 AM    CT Cervical Spine Without Contrast    Result Date: 11/8/2023  FINAL REPORT CLINICAL HISTORY: fall FINDINGS: Axial CT images of the cervical spine were obtained without contrast. Sagittal and coronal reformatted images were also obtained. This study was performed with techniques to keep radiation doses as low as reasonably achievable (ALARA). Individualized dose reduction techniques using automated exposure control or adjustment of mA and/or kV according to the patient''s size were employed.  Motion artifact and patient body habitus limits this study.  There is no definite evidence of fracture or dislocation. The bony alignment is normal.  There are moderate and severe degenerative changes, worst on the cervical spine There is no evidence of canal stenosis. No paraspinous soft tissue abnormality is seen. Limited images of the upper thorax are unremarkable.     No definite fracture or other acute bony abnormality identified. Authenticated and Electronically Signed by Adria Garibay III, MD on 11/08/2023 12:05:18 AM    CT Head Without Contrast    Result Date: 11/8/2023  FINAL REPORT  CLINICAL HISTORY: fall, head injury FINDINGS: Axial images of the head were obtained without contrast. Coronal reformatted images were also obtained.This study was performed with techniques to keep radiation doses as low as reasonably achievable (ALARA). Individualized dose reduction techniques using automated exposure control or adjustment of mA and/or kV according to the patient''s size were employed.  There is motion on many images.  There is no evidence of intracranial hemorrhage or mass. The ventricular size is within normal limits. There is no evidence of shift of the midline structures. No abnormal extra axial fluid collection is identified. No skull abnormality is seen on the bone window images.  There is complete opacification of the right maxillary sinus.     No acute intracranial abnormality. Authenticated and Electronically Signed by Adria Garibay III, MD on 11/08/2023 12:05:11 AM     Assessment:    Acute respiratory failure with hypoxia and hypercapnia, POA  Suspected COPD with exacerbation  Reported fall  Elevated troponin  Uncontrolled MADELAINE  Alcohol intoxication  Altered mental status, resolved  Chronic kidney disease stage III  Lupus  RA  Chronic tobacco abuse    Plan:    Admit as observation    Respiratory failure:  Likely combination of uncontrolled sleep apnea in setting of ongoing tobacco abuse and alcohol intoxication.  He is also on multiple sedating medications at home including gabapentin, tramadol, and was noted to be positive for buprenorphine in UDS.  Have placed on steroids and bronchodilators for now.  Does not appear to be overloaded or have aspirated.  No indication for antibiotics.  Wean oxygen as tolerated.  Continue with BiPAP.    Elevated troponin:  Suspect type II demand related in setting of hypercapnia and hypoxia.  Monitor on telemetry.  We will add a 2D echo.    Reported fall:  Patient unaware of fall tonight per his report, but notes he has had multiple falls which he  attributes to knee issues due to his arthritis.  He does have some mild abrasions on the hand, appears otherwise have normal range of motion of the extremities.  Doubt he had true syncopal episode.    AMS:  Suspect related to alcohol intoxication and hypercapnia.  This is already improved and he is back to baseline.  Lower suspicion for CVA is presenting issue.    CKD 3:  Numbers overall appear stable.  Follows with nephrology.    Patient otherwise meets observation level of care and anticipate less than 2 midnight stay.  Further recommendation depend upon clinical course.    Risk Assessment: High  DVT Prophylaxis: Heparin  Code Status: Full  Diet: Renal    Advance Care Planning  ACP discussion was held with the patient during this visit. Patient does not have an advance directive, declines further assistance.           Lisa Dias DO  11/08/23  05:41 EST    Dictated utilizing Dragon dictation.    Electronically signed by Lisa Dias DO at 11/08/23 0551          Emergency Department Notes        Nargis Umana RN at 11/08/23 0617          Gave report to CHELSEA Lara at this time.     Electronically signed by Nargis Umana RN at 11/08/23 0617       Nargis Umana RN at 11/07/23 2310          Unable to complete triage due to pt being unresponsive.    Electronically signed by Nargis Umana RN at 11/08/23 0149       Gladys Peres MD at 11/07/23 2302          TRIAGE CHIEF COMPLAINT:     Nursing and triage notes reviewed    Chief Complaint   Patient presents with    Head Injury      HPI: Lourdes Hernandez is a 89 y.o. male who presents to the emergency department complaining of altered mental status, intoxication, head injury.  Patient is brought in by EMS after having fell and hit the back of his head on a wood railing.  Unknown loss of consciousness.  Patient had reported large amount of alcohol throughout the day.  EMS report that patient has had a GCS of around 11-12 in route to the hospital and was  found to be hypoxic.  History is somewhat limited.    REVIEW OF SYSTEMS: All other systems reviewed and are negative     PAST MEDICAL HISTORY:   No past medical history on file.     FAMILY HISTORY:   No family history on file.     SOCIAL HISTORY:         SURGICAL HISTORY:   No past surgical history on file.     CURRENT MEDICATIONS:      Medication List      You have not been prescribed any medications.          ALLERGIES: Patient has no allergy information on record.     PHYSICAL EXAM:   VITAL SIGNS:   Vitals:    11/07/23 2232   BP: 128/71   Pulse: 68   Resp: 14   SpO2: 92%      CONSTITUTIONAL: Asleep but will awaken to sternal rub  HENT: Posterior scalp abrasions with no deep laceration, oral mucosa pink and moist, airway patent. Nares patent without drainage. External ears normal.   EYES: Conjunctivae clear, EOMI, PERRL   NECK: Trachea midline, no obvious tenderness  CARDIOVASCULAR: Normal heart rate, Normal rhythm, No murmurs, rubs, gallops   PULMONARY/CHEST: Scattered coarse lung sounds throughout.  Snoring respirations.  ABDOMINAL: Nondistended, soft, nontender - no rebound or guarding.   NEUROLOGIC: Nonfocal, moving all four extremities, no gross sensory or motor deficits.   EXTREMITIES: No clubbing, cyanosis, or edema   SKIN: Warm, Dry, No erythema, No rash     ED COURSE / MEDICAL DECISION MAKING:   Lourdes Hernandez is a 89 y.o. male who presents to the emergency department for evaluation of altered mental status and head injury.  Patient is arousable to sternal rub upon arrival.  He arrives on a nonrebreather mask.    Differential diagnosis includes head injury, alcohol intoxication, respiratory illness or compromise among other etiologies.    CT scan of the head, chest x-ray, EKG, CBC, CMP, alcohol level, cardiac enzymes, ABG was ordered for further evaluation of the patient's presentation.    Diagnostic information from other sources: EMS, chart review    Interventions: Oxygen therapy, BiPAP    EKG  interpreted by me reveals sinus rhythm with rate of 73 bpm.  There is sinus arrhythmia.  There is low QRS voltage.  This is an atypical appearing EKG.    Narrative: Presents with altered mental status.  Patient does appear to be extremely intoxicated on arrival and I believe this to be the main cause of his symptoms.  Laboratory testing initially reveals significant acidosis with a CO2 in the 80s with a pH of 7.0.  Ethanol also elevated at 236.  Other labs largely around baseline aside from cardiac enzymes that are elevated at 36 and then 54 and 47.  Patient was placed on BiPAP therapy.  Chest x-ray per my interpretation does not reveal obvious acute process.  Patient given a breathing treatment due to some mild wheezes.  Repeat blood gases did show improvement.  Patient had slow improvement in symptoms and became more awake and alert.  CT scan of the head and cervical spine per radiology interpretation did not reveal obvious acute abnormalities.  Patient did continue to remain somewhat hypoxic.  Will admit to the hospital for further treatment and evaluation.  Hospitalist agreed to accept the patient.    Re-evaluation: Patient more awake and alert    Plan for disposition is admission    DECISION TO DISCHARGE/ADMIT: see ED care timeline     FINAL IMPRESSION:   1 --respiratory failure with hypoxia and hypercapnia  2 --alcohol intoxication  3 --head injury    Electronically signed by: Gladys Peres MD, 11/7/2023 23:02 Gladys Bacon MD  11/08/23 0556      Electronically signed by Gladys Peres MD at 11/08/23 0556       Vital Signs (last day)       Date/Time Temp Temp src Pulse Resp BP Patient Position SpO2    11/08/23 1008 -- -- -- -- 106/84 -- --    11/08/23 0900 -- -- -- -- 106/84 -- --    11/08/23 0708 -- -- -- 20 -- Lying --    11/08/23 0600 -- -- 50 -- 126/75 -- 99    11/08/23 0542 -- -- -- 19 -- -- --    11/08/23 0145 -- -- 51 -- 107/71 -- 97    11/08/23 0059 -- -- 49 -- 96/74 --  95    11/08/23 0029 96.5 (35.8) Axillary -- -- -- -- --    11/08/23 0001 -- -- 57 -- 103/58 -- 89    11/07/23 2330 -- -- 65 -- 120/76 -- 92    11/07/23 2232 -- -- 68 14 128/71 Sitting 92          Current Facility-Administered Medications   Medication Dose Route Frequency Provider Last Rate Last Admin    acetaminophen (TYLENOL) tablet 650 mg  650 mg Oral Q6H PRN Lisa Dias DO   650 mg at 11/08/23 0310    aluminum-magnesium hydroxide-simethicone (MAALOX MAX) 400-400-40 MG/5ML suspension 15 mL  15 mL Oral Q6H PRN Lisa Dias DO        amLODIPine (NORVASC) tablet 10 mg  10 mg Oral Nightly Maylin Pham APRN        sennosides-docusate (PERICOLACE) 8.6-50 MG per tablet 2 tablet  2 tablet Oral BID Lisa Dias DO   2 tablet at 11/08/23 0838    And    polyethylene glycol (MIRALAX) packet 17 g  17 g Oral Daily PRN Lisa Dias DO        And    bisacodyl (DULCOLAX) EC tablet 5 mg  5 mg Oral Daily PRN Lisa Dias DO        And    bisacodyl (DULCOLAX) suppository 10 mg  10 mg Rectal Daily PRN Lisa Dias DO        budesonide-formoterol (SYMBICORT) 160-4.5 MCG/ACT inhaler 2 puff  2 puff Inhalation BID - RT Lisa Dias DO   2 puff at 11/08/23 0837    carvedilol (COREG) tablet 25 mg  25 mg Oral BID With Meals Maylin Pham APRN        [START ON 11/9/2023] escitalopram (LEXAPRO) tablet 10 mg  10 mg Oral QAM Maylin Pham APRN        gabapentin (NEURONTIN) capsule 600 mg  600 mg Oral Q8H Dre Salas MD        guaiFENesin-dextromethorphan (ROBITUSSIN DM) 100-10 MG/5ML syrup 10 mL  10 mL Oral Q6H PRN Lisa Dias DO        heparin (porcine) 5000 UNIT/ML injection 5,000 Units  5,000 Units Subcutaneous Q12H Lisa Dias DO   5,000 Units at 11/08/23 0838    ipratropium-albuterol (DUO-NEB) nebulizer solution 3 mL  3 mL Nebulization Q6H - RT Lisa Dias DO        lisinopril (PRINIVIL,ZESTRIL) tablet 40 mg   40 mg Oral Daily Maylin Pham APRN   40 mg at 11/08/23 1347    melatonin tablet 5 mg  5 mg Oral Nightly PRN Lisa Dias DO        nicotine (NICODERM CQ) 21 MG/24HR patch 1 patch  1 patch Transdermal Q24H Lisa Dias DO   1 patch at 11/08/23 0838    nicotine polacrilex (NICORETTE) gum 2 mg  2 mg Mouth/Throat Q1H PRN Lisa Dias,         nitroglycerin (NITROSTAT) SL tablet 0.4 mg  0.4 mg Sublingual Q5 Min PRN Lisa Dias,         ondansetron (ZOFRAN) tablet 4 mg  4 mg Oral Q6H PRN Lisa Dias DO        Or    ondansetron (ZOFRAN) injection 4 mg  4 mg Intravenous Q6H PRN Lisa Dias,         predniSONE (DELTASONE) tablet 40 mg  40 mg Oral Daily With Breakfast Lisa Dias DO   40 mg at 11/08/23 0838    sodium chloride 0.9 % flush 10 mL  10 mL Intravenous Q12H Lisa Dias,         sodium chloride 0.9 % flush 10 mL  10 mL Intravenous PRN Lisa Dias,         sodium chloride 0.9 % infusion 40 mL  40 mL Intravenous PRN Lisa Dias,         sodium chloride 0.9 % infusion  50 mL/hr Intravenous Continuous Lisa Dias DO        traMADol (ULTRAM) tablet 50 mg  50 mg Oral Q12H PRN Dre Salas MD         Lab Results (last 24 hours)       Procedure Component Value Units Date/Time    POC Glucose Once [[id number]]  (Normal) Collected: 11/08/23 1019    Specimen: Blood Updated: 11/08/23 1022     Glucose 126 mg/dL      Comment: Serial Number: [id number]:  [id number]       Blood Gas, Arterial With Co-Ox [[id number]]  (Abnormal) Collected: 11/08/23 0409    Specimen: Arterial Blood Updated: 11/08/23 0409     Site Left Brachial     Arjun's Test N/A     pH, Arterial 7.253 pH units      Comment: 84 Value below reference range        pCO2, Arterial 52.1 mm Hg      Comment: 83 Value above reference range        pO2, Arterial 45.9 mm Hg      Comment: 85 Value below critical limit        HCO3,  Arterial 23.0 mmol/L      Base Excess, Arterial -4.6 mmol/L      Comment: 84 Value below reference range        O2 Saturation, Arterial 80.6 %      Comment: 84 Value below reference range        Hematocrit, Blood Gas 37.2 %      Comment: 84 Value below reference range        Oxyhemoglobin 75.8 %      Comment: 84 Value below reference range        Methemoglobin 0.40 %      Carboxyhemoglobin 5.6 %      Comment: 83 Value above reference range        A-a DO2 115.8 mmHg      Barometric Pressure for Blood Gas 733 mmHg      Modality Nasal Cannula     FIO2 32 %      Ventilator Mode NA     Notified Who RN     Notified By [id number]     Notified Time 11/08/2023 05:16     Collected by RN     Comment: Meter: [id number]     :  [id number]        pH, Temp Corrected --     pCO2, Temperature Corrected --     pO2, Temperature Corrected --    Single High Sensitivity Troponin T [[id number]]  (Abnormal) Collected: 11/08/23 0312    Specimen: Blood Updated: 11/08/23 0339     HS Troponin T 47 ng/L     Narrative:      High Sensitive Troponin T Reference Range:  <14.0 ng/L- Negative Female for AMI  <22.0 ng/L- Negative Male for AMI  >=14 - Abnormal Female indicating possible myocardial injury.  >=22 - Abnormal Male indicating possible myocardial injury.   Clinicians would have to utilize clinical acumen, EKG, Troponin, and serial changes to determine if it is an Acute Myocardial Infarction or myocardial injury due to an underlying chronic condition.         High Sensitivity Troponin T 2Hr [[id number]]  (Abnormal) Collected: 11/08/23 0104    Specimen: Blood Updated: 11/08/23 0143     HS Troponin T 54 ng/L      Troponin T Delta 18 ng/L     Narrative:      High Sensitive Troponin T Reference Range:  <14.0 ng/L- Negative Female for AMI  <22.0 ng/L- Negative Male for AMI  >=14 - Abnormal Female indicating possible myocardial injury.  >=22 - Abnormal Male indicating possible myocardial injury.   Clinicians would have to utilize clinical  acumen, EKG, Troponin, and serial changes to determine if it is an Acute Myocardial Infarction or myocardial injury due to an underlying chronic condition.         Blood Gas, Arterial With Co-Ox [[id number]]  (Abnormal) Collected: 11/08/23 0116    Specimen: Arterial Blood Updated: 11/08/23 0116     Site Right Radial     Arjun's Test Positive     pH, Arterial 7.210 pH units      pCO2, Arterial 62.8 mm Hg      pO2, Arterial 79.1 mm Hg      HCO3, Arterial 25.1 mmol/L      Base Excess, Arterial -4.1 mmol/L      O2 Saturation, Arterial 94.2 %      Hematocrit, Blood Gas 44.9 %      Oxyhemoglobin 87.6 %      Methemoglobin 0.20 %      Carboxyhemoglobin 6.7 %      A-a DO2 126.5 mmHg      Barometric Pressure for Blood Gas 734 mmHg      Modality BiPap     FIO2 40 %      Ventilator Mode NA     IPAP 16     Comment: Meter: [id number]     :  [id number]        EPAP 8     Collected by      pH, Temp Corrected --     pCO2, Temperature Corrected --     pO2, Temperature Corrected --    Urine Drug Screen - Urine, Clean Catch [[id number]]  (Abnormal) Collected: 11/07/23 2315    Specimen: Urine, Clean Catch Updated: 11/07/23 2335     THC, Screen, Urine Negative     Phencyclidine (PCP), Urine Negative     Cocaine Screen, Urine Negative     Methamphetamine, Ur Negative     Opiate Screen Negative     Amphetamine Screen, Urine Negative     Benzodiazepine Screen, Urine Negative     Tricyclic Antidepressants Screen Negative     Methadone Screen, Urine Negative     Barbiturates Screen, Urine Negative     Oxycodone Screen, Urine Negative     Buprenorphine, Screen, Urine Positive    Narrative:      Cutoff For Drugs Screened:    Amphetamines               500 ng/ml  Barbiturates               200 ng/ml  Benzodiazepines            150 ng/ml  Cocaine                    150 ng/ml  Methadone                  200 ng/ml  Opiates                    100 ng/ml  Phencyclidine               25 ng/ml  THC                         50  ng/ml  Methamphetamine            500 ng/ml  Tricyclic Antidepressants  300 ng/ml  Oxycodone                  100 ng/ml  Buprenorphine               10 ng/ml    The normal value for all drugs tested is negative. This report includes unconfirmed screening results, with the cutoff values listed, to be used for medical treatment purposes only.  Unconfirmed results must not be used for non-medical purposes such as employment or legal testing.  Clinical consideration should be applied to any drug of abuse test, particularly when unconfirmed results are used.      Fentanyl, Urine - Urine, Clean Catch [[id number]]  (Normal) Collected: 11/07/23 2315    Specimen: Urine, Clean Catch Updated: 11/07/23 2333     Fentanyl, Urine Negative    Narrative:      Negative Threshold:      Fentanyl 5 ng/mL     The normal value for the drug tested is negative. This report includes final unconfirmed screening results to be used for medical treatment purposes only. Unconfirmed results must not be used for non-medical purposes such as employment or legal testing. Clinical consideration should be applied to any drug of abuse test, particularly when unconfirmed results are used.           Urinalysis With Culture If Indicated - Urine, Catheter [[id number]]  (Normal) Collected: 11/07/23 2314    Specimen: Urine, Catheter Updated: 11/07/23 2322     Color, UA Yellow     Appearance, UA Clear     pH, UA 5.5     Specific Gravity, UA 1.010     Glucose, UA Negative     Ketones, UA Negative     Bilirubin, UA Negative     Blood, UA Negative     Protein, UA Negative     Leuk Esterase, UA Negative     Nitrite, UA Negative     Urobilinogen, UA 0.2 E.U./dL    Narrative:      In absence of clinical symptoms, the presence of pyuria, bacteria, and/or nitrites on the urinalysis result does not correlate with infection.  Urine microscopic not indicated.    Procalcitonin [[id number]]  (Normal) Collected: 11/07/23 2238    Specimen: Blood Updated: 11/07/23 2308      "Procalcitonin 0.09 ng/mL     Narrative:      As a Marker for Sepsis (Non-Neonates):    1. <0.5 ng/mL represents a low risk of severe sepsis and/or septic shock.  2. >2 ng/mL represents a high risk of severe sepsis and/or septic shock.    As a Marker for Lower Respiratory Tract Infections that require antibiotic therapy:    PCT on Admission    Antibiotic Therapy       6-12 Hrs later    >0.5                Strongly Recommended  >0.25 - <0.5        Recommended   0.1 - 0.25          Discouraged              Remeasure/reassess PCT  <0.1                Strongly Discouraged     Remeasure/reassess PCT    As 28 day mortality risk marker: \"Change in Procalcitonin Result\" (>80% or <=80%) if Day 0 (or Day 1) and Day 4 values are available. Refer to http://www.[facility]-pct-calculator.com    Change in PCT <=80%  A decrease of PCT levels below or equal to 80% defines a positive change in PCT test result representing a higher risk for 28-day all-cause mortality of patients diagnosed with severe sepsis for septic shock.    Change in PCT >80%  A decrease of PCT levels of more than 80% defines a negative change in PCT result representing a lower risk for 28-day all-cause mortality of patients diagnosed with severe sepsis or septic shock.       High Sensitivity Troponin T [[id number]]  (Abnormal) Collected: 11/07/23 2238    Specimen: Blood Updated: 11/07/23 2303     HS Troponin T 36 ng/L     Narrative:      High Sensitive Troponin T Reference Range:  <14.0 ng/L- Negative Female for AMI  <22.0 ng/L- Negative Male for AMI  >=14 - Abnormal Female indicating possible myocardial injury.  >=22 - Abnormal Male indicating possible myocardial injury.   Clinicians would have to utilize clinical acumen, EKG, Troponin, and serial changes to determine if it is an Acute Myocardial Infarction or myocardial injury due to an underlying chronic condition.         BNP [[id number]]  (Normal) Collected: 11/07/23 2238    Specimen: Blood Updated: 11/07/23 2303 "     proBNP 100.9 pg/mL     Narrative:      This assay is used as an aid in the diagnosis of individuals suspected of having heart failure. It can be used as an aid in the diagnosis of acute decompensated heart failure (ADHF) in patients presenting with signs and symptoms of ADHF to the emergency department (ED). In addition, NT-proBNP of <300 pg/mL indicates ADHF is not likely.    Age Range Result Interpretation  NT-proBNP Concentration (pg/mL:      <50             Positive            >450                   Gray                 300-450                    Negative             <300    50-75           Positive            >900                  Gray                300-900                  Negative            <300      >75             Positive            >1800                  Gray                300-1800                  Negative            <300    Comprehensive Metabolic Panel [[id number]]  (Abnormal) Collected: 11/07/23 2238    Specimen: Blood Updated: 11/07/23 2259     Glucose 115 mg/dL      BUN 31 mg/dL      Creatinine 1.40 mg/dL      Sodium 134 mmol/L      Potassium 3.7 mmol/L      Chloride 98 mmol/L      CO2 23.0 mmol/L      Calcium 8.2 mg/dL      Total Protein 7.7 g/dL      Albumin 4.1 g/dL      ALT (SGPT) 41 U/L      AST (SGOT) 40 U/L      Alkaline Phosphatase 71 U/L      Total Bilirubin 0.3 mg/dL      Globulin 3.6 gm/dL      A/G Ratio 1.1 g/dL      BUN/Creatinine Ratio 22.1     Anion Gap 13.0 mmol/L      eGFR 26.9 mL/min/1.73     Narrative:      GFR Normal >60  Chronic Kidney Disease <60  Kidney Failure <15    The GFR formula is only valid for adults with stable renal function between ages 18 and 70.    Ethanol [[id number]]  (Abnormal) Collected: 11/07/23 2238    Specimen: Blood Updated: 11/07/23 2259     Ethanol 236 mg/dL      Ethanol % 0.236 %     Narrative:      This result is for medical use only and should not be used for forensic purposes.    Magnesium [[id number]]  (Normal) Collected: 11/07/23 2238     Specimen: Blood Updated: 11/07/23 2259     Magnesium 1.8 mg/dL     CBC & Differential [[id number]]  (Abnormal) Collected: 11/07/23 2238    Specimen: Blood Updated: 11/07/23 2243    Narrative:      The following orders were created for panel order CBC & Differential.  Procedure                               Abnormality         Status                     ---------                               -----------         ------                     CBC Auto Differential[[id number]]        Abnormal            Final result                 Please view results for these tests on the individual orders.    CBC Auto Differential [[id number]]  (Abnormal) Collected: 11/07/23 2238    Specimen: Blood Updated: 11/07/23 2243     WBC 9.65 10*3/mm3      RBC 3.86 10*6/mm3      Hemoglobin 13.9 g/dL      Hematocrit 40.5 %      .9 fL      MCH 36.0 pg      MCHC 34.3 g/dL      RDW 13.4 %      RDW-SD 51.2 fl      MPV 9.0 fL      Platelets 271 10*3/mm3      Neutrophil % 45.9 %      Lymphocyte % 38.0 %      Monocyte % 10.8 %      Eosinophil % 3.7 %      Basophil % 1.0 %      Immature Grans % 0.6 %      Neutrophils, Absolute 4.42 10*3/mm3      Lymphocytes, Absolute 3.67 10*3/mm3      Monocytes, Absolute 1.04 10*3/mm3      Eosinophils, Absolute 0.36 10*3/mm3      Basophils, Absolute 0.10 10*3/mm3      Immature Grans, Absolute 0.06 10*3/mm3      nRBC 0.2 /100 WBC           Imaging Results (Last 24 Hours)       Procedure Component Value Units Date/Time    XR Chest 1 View [[id number]] Collected: 11/08/23 0112     Updated: 11/08/23 0114    Narrative:      FINAL REPORT    TECHNIQUE:  null    CLINICAL HISTORY:  soa    COMPARISON:  null    FINDINGS:  Single view of the chest    Comparison:    None    Findings: Low lung volumes. Otherwise normal heart, lungs and mediastinum. No evidence for pneumonia.      Impression:      Impression:    Low lung volumes.    Authenticated and Electronically Signed by Chace Perdomo MD on  11/08/2023 01:12:39 AM    CT Head  Without Contrast [[id number]] Collected: 11/08/23 0005     Updated: 11/08/23 0006    Narrative:      FINAL REPORT    CLINICAL HISTORY:  fall, head injury    FINDINGS:  Axial images of the head were obtained without contrast. Coronal  reformatted images were also obtained.This study was performed  with techniques to keep radiation doses as low as reasonably  achievable (ALARA). Individualized dose reduction techniques  using automated exposure control or adjustment of mA and/or kV  according to the patient''s size were employed.  There is motion  on many images.  There is no evidence of intracranial hemorrhage  or mass. The ventricular size is within normal limits. There is  no evidence of shift of the midline structures. No abnormal  extra axial fluid collection is identified. No skull abnormality  is seen on the bone window images.  There is complete  opacification of the right maxillary sinus.      Impression:      No acute intracranial abnormality.    Authenticated and Electronically Signed by Adria Garibay III, MD on 11/08/2023 12:05:11 AM    CT Cervical Spine Without Contrast [[id number]] Collected: 11/08/23 0005     Updated: 11/08/23 0006    Narrative:      FINAL REPORT    CLINICAL HISTORY:  fall    FINDINGS:  Axial CT images of the cervical spine were obtained without  contrast. Sagittal and coronal reformatted images were also  obtained. This study was performed with techniques to keep  radiation doses as low as reasonably achievable (ALARA).  Individualized dose reduction techniques using automated  exposure control or adjustment of mA and/or kV according to the  patient''s size were employed.  Motion artifact and patient body  habitus limits this study.  There is no definite evidence of  fracture or dislocation. The bony alignment is normal.  There  are moderate and severe degenerative changes, worst on the  cervical spine There is no evidence of canal stenosis. No  paraspinous soft tissue abnormality  is seen. Limited images of  the upper thorax are unremarkable.      Impression:      No definite fracture or other acute bony abnormality identified.    Authenticated and Electronically Signed by Adria Garibay III, MD on 2023 12:05:18 AM             Physician Progress Notes (last 24 hours)        Maylin Pham APRN at 23 1000              [facility]    PROGRESS NOTE    Name:  Dharmesh Samuel   Age:  52 y.o.  Sex:  male  :  1970  MRN:  [id number]   Visit Number:  [id number]  Admission Date:  2023  Date Of Service:  23  Primary Care Physician:  Michelle Sky APRN     LOS: 0 days :    Chief Complaint:      Confusion/ Hypoxia    Subjective:    Patient seen and examined at bedside this morning.  He is getting echo at time of exam.  States he was at his neighbor's house last night drinking more than his usual and fell.  Does not remember getting to the hospital.  States he always hurts all over.  States his wife left yesterday.  Advises he usually drinks about 4 cups of vodka/ OJ nightly and has for many years, sometimes more.  States he is disabled.  States he smokes about 1PPD and has for 30+years.  States he does have Cpap for MADELAINE, however, only uses 1-2x/ week as he doesn't like it.  Denies shortness of breath, but states that his chronic pain takes his breath often.     Hospital Course:    The patient is an apparent 52-year-old with a medical history of MADELAINE, tobacco use, chronic pain, chronic kidney disease stage III, hypertension, obesity, alcohol use, who presented from home via EMS with apparently multiple complaints.  History obtained from ER record, provider, patient.  Per report, EMS had been called out after the patient apparently had had a fall and hit his head on a railing at home.  Unsure if he lost consciousness.  He had apparently appeared intoxicated upon arrival and was brought to the emergency room.  He was also noted to be hypoxic  per report.  At time of my visit, the patient was awake, alert, able to follow commands.  He was unaware of how he got to the hospital.  When asked about alcohol use, he notes he does drink quite frequently at nighttime.  He also admitted to having sleep apnea and supposed to use a CPAP/BiPAP but does not always.  He is unsure of where he was at tonight prior to coming in.  He has had some shortness of breath but denies any chest pain, fever, cough.  He was asking if he can go outside and smoke a cigarette.     In the ER, patient's blood pressure was stable.  He is temperature was 97 his heart rate was in the 60 range.  He was initially on nonrebreather mask and placed on BiPAP.  Labs with white count 9 hemoglobin 13 platelets 271.  proBNP of 100.  Ethanol of 0.236.  Procalcitonin 0.09.  Creatinine was 1.4.  Urinalysis unremarkable.  UDS positive buprenorphine.  Initial troponin did elevate on repeat test, however decreased on a third troponin.  ABG notable for pH 7.210 PCO2 of 62.  CT of the cervical spine was unremarkable.  CT scan of the head without contrast was unremarkable.  Chest x-ray appeared to show some atelectasis otherwise no acute abnormality.  The patient was monitored in the emergency room for multiple hours, so oxygen had been weaned off a couple times, however he continued to show hypoxia and will drop into the mid upper 80s.  We were subsequent asked admit thereafter.    Review of Systems:     All systems were reviewed and negative except as mentioned in subjective, assessment and plan.    Vital Signs:    Temp:  [96.5 °F (35.8 °C)] 96.5 °F (35.8 °C)  Heart Rate:  [49-68] 50  Resp:  [14-20] 20  BP: ()/(58-84) 106/84    Intake and output:    No intake/output data recorded.  No intake/output data recorded.    Physical Examination:    General Appearance:  Alert and cooperative, middle aged male, no acute distress on exam, obese with BMI-40, appears chronically ill   Head:  Atraumatic and  normocephalic.   Eyes: Conjunctivae and sclerae normal, no icterus. No pallor.   Throat: No oral lesions, no thrush, oral mucosa moist.   Neck: Supple, trachea midline   Lungs:   Breath sounds heard bilaterally equally but diminished throughout.  No wheezing or crackles. Unlabored with stable sats on 2L   Heart:  Normal S1 and S2, no murmur, no gallop, no rub. No JVD.   Abdomen:   Normal bowel sounds, no masses, no organomegaly. Soft, nontender, nondistended, no rebound tenderness.   Extremities: Supple, no edema, no cyanosis, no clubbing.   Skin: No bleeding or rash.   Neurologic: Alert and oriented x 3. No facial asymmetry. Moves all four limbs. No tremors. Generalized weakness is present.     Laboratory results:    Results from last 7 days   Lab Units 11/07/23  2238   SODIUM mmol/L 134*   POTASSIUM mmol/L 3.7   CHLORIDE mmol/L 98   CO2 mmol/L 23.0   BUN mg/dL 31*   CREATININE mg/dL 1.40*   CALCIUM mg/dL 8.2   BILIRUBIN mg/dL 0.3   ALK PHOS U/L 71   ALT (SGPT) U/L 41   AST (SGOT) U/L 40   GLUCOSE mg/dL 115*     Results from last 7 days   Lab Units 11/07/23  2238   WBC 10*3/mm3 9.65   HEMOGLOBIN g/dL 13.9   HEMATOCRIT % 40.5   PLATELETS 10*3/mm3 271         Results from last 7 days   Lab Units 11/08/23  0312 11/08/23  0104 11/07/23  2238   HSTROP T ng/L 47* 54* 36*         Recent Labs     11/08/23  0116 11/08/23  0409   PHART 7.210* 7.253*   WDE8OCW 62.8* 52.1*   PO2ART 79.1 45.9*   YRE9POS 25.1 23.0   BASEEXCESS -4.1* -4.6*      I have reviewed the patient's laboratory results.    Radiology results:    Adult Transthoracic Echo Complete With Contrast if Necessary Per Protocol    Result Date: 11/8/2023    Left ventricular systolic function is hyperdynamic (EF > 70%). Estimated left ventricular EF = 77% Left ventricular ejection fraction appears to be greater than 70%.   Left ventricular diastolic function was normal.     XR Chest 1 View    Result Date: 11/8/2023  FINAL REPORT TECHNIQUE: null CLINICAL HISTORY: trisha  COMPARISON: null FINDINGS: Single view of the chest Comparison: None Findings: Low lung volumes. Otherwise normal heart, lungs and mediastinum. No evidence for pneumonia.     Impression: Impression: Low lung volumes. Authenticated and Electronically Signed by Chace Perdomo MD on 11/08/2023 01:12:39 AM    CT Cervical Spine Without Contrast    Result Date: 11/8/2023  FINAL REPORT CLINICAL HISTORY: fall FINDINGS: Axial CT images of the cervical spine were obtained without contrast. Sagittal and coronal reformatted images were also obtained. This study was performed with techniques to keep radiation doses as low as reasonably achievable (ALARA). Individualized dose reduction techniques using automated exposure control or adjustment of mA and/or kV according to the patient''s size were employed.  Motion artifact and patient body habitus limits this study.  There is no definite evidence of fracture or dislocation. The bony alignment is normal.  There are moderate and severe degenerative changes, worst on the cervical spine There is no evidence of canal stenosis. No paraspinous soft tissue abnormality is seen. Limited images of the upper thorax are unremarkable.     Impression: No definite fracture or other acute bony abnormality identified. Authenticated and Electronically Signed by Adria Garibay III, MD on 11/08/2023 12:05:18 AM    CT Head Without Contrast    Result Date: 11/8/2023  FINAL REPORT CLINICAL HISTORY: fall, head injury FINDINGS: Axial images of the head were obtained without contrast. Coronal reformatted images were also obtained.This study was performed with techniques to keep radiation doses as low as reasonably achievable (ALARA). Individualized dose reduction techniques using automated exposure control or adjustment of mA and/or kV according to the patient''s size were employed.  There is motion on many images.  There is no evidence of intracranial hemorrhage or mass. The ventricular size is within  normal limits. There is no evidence of shift of the midline structures. No abnormal extra axial fluid collection is identified. No skull abnormality is seen on the bone window images.  There is complete opacification of the right maxillary sinus.     Impression: No acute intracranial abnormality. Authenticated and Electronically Signed by Adria Garibay III, MD on 11/08/2023 12:05:11 AM   I have reviewed the patient's radiology reports.    Medication Review:     I have reviewed the patient's active and prn medications.     Problem List:      Acute respiratory failure with hypoxia and hypercapnia    COPD exacerbation    Acute respiratory failure with hypoxia      Assessment:    Acute respiratory failure with hypoxia and hypercapnia, POA  Suspected COPD with exacerbation  Reported fall  Elevated troponin  Uncontrolled MADELAINE noncompliant with cpap  Alcohol intoxication and chronic use  Altered mental status, resolved  Chronic kidney disease stage III  Lupus  RA on Embrel  Chronic tobacco abuse    Plan:    Respiratory failure:  -Likely combination of uncontrolled sleep apnea in setting of ongoing tobacco abuse and alcohol intoxication.    -He is also on multiple sedating medications at home including gabapentin, tramadol, and was noted to be positive for buprenorphine in UDS.    -Continue steroids and bronchodilators for now.    -Does not appear to be overloaded or have aspirated.    -No indication for antibiotics.    -Wean oxygen as tolerated.    -Continue with BiPAP.     Elevated troponin:  -Suspect type II demand related in setting of hypercapnia and hypoxia.  -Monitor on telemetry.    -2D echo noted left systolic function hyperdynamic with EF-77% and diastolic function normal     Reported fall:  -Patient unaware of fall tonight per his report, but notes he has had multiple falls which he attributes to knee issues due to his arthritis.  He does have some mild abrasions on the hand, appears otherwise have normal  range of motion of the extremities.  Doubt he had true syncopal episode, more likely chronic debility with alcohol abuse     AMS:  -Suspect related to alcohol intoxication and hypercapnia.  This is already improved and he is back to baseline.      CKD 3:  -Numbers overall appear stable close to baseline.    -Follows with nephrology.    Chronic  -Will restart chronic Gabapentin/ Tramadol at lower frequency  -Nicotine patch  -CIWA monitoring per nursing  -Smoking cessation  -Alcohol cessation    DVT Prophylaxis: Heparin  Code Status: Full Code  Diet: Renal  Discharge Plan: 1-2 days    AMY Che  11/08/23  10:47 EST    Dictated utilizing Dragon dictation.      Electronically signed by Maylin Pham APRN at 11/08/23 1058       Consult Notes (last 24 hours)  Notes from 11/07/23 1353 through 11/08/23 1353   No notes of this type exist for this encounter.

## 2023-11-09 ENCOUNTER — READMISSION MANAGEMENT (OUTPATIENT)
Dept: CALL CENTER | Facility: HOSPITAL | Age: 53
End: 2023-11-09
Payer: COMMERCIAL

## 2023-11-09 VITALS
HEART RATE: 58 BPM | SYSTOLIC BLOOD PRESSURE: 144 MMHG | DIASTOLIC BLOOD PRESSURE: 87 MMHG | OXYGEN SATURATION: 92 % | BODY MASS INDEX: 40.82 KG/M2 | HEIGHT: 72 IN | RESPIRATION RATE: 18 BRPM | WEIGHT: 301.37 LBS | TEMPERATURE: 97.9 F

## 2023-11-09 LAB
A-A DO2: 25.2 MMHG
ANION GAP SERPL CALCULATED.3IONS-SCNC: 11.4 MMOL/L (ref 5–15)
ARTERIAL PATENCY WRIST A: POSITIVE
ATMOSPHERIC PRESS: 731 MMHG
BASE EXCESS BLDA CALC-SCNC: -0.3 MMOL/L (ref 0–2)
BDY SITE: ABNORMAL
BUN SERPL-MCNC: 23 MG/DL (ref 6–20)
BUN/CREAT SERPL: 19.2 (ref 7–25)
CALCIUM SPEC-SCNC: 9.8 MG/DL (ref 8.6–10.5)
CHLORIDE SERPL-SCNC: 97 MMOL/L (ref 98–107)
CO2 SERPL-SCNC: 25.6 MMOL/L (ref 22–29)
COHGB MFR BLD: 1.6 % (ref 0–2)
CREAT SERPL-MCNC: 1.2 MG/DL (ref 0.76–1.27)
EGFRCR SERPLBLD CKD-EPI 2021: 73.2 ML/MIN/1.73
GLUCOSE SERPL-MCNC: 119 MG/DL (ref 65–99)
HCO3 BLDA-SCNC: 25.7 MMOL/L (ref 22–28)
HCT VFR BLD CALC: 47.5 %
INHALED O2 CONCENTRATION: 21 %
Lab: ABNORMAL
METHGB BLD QL: 0.2 % (ref 0–1.5)
MODALITY: ABNORMAL
OXYHGB MFR BLDV: 92.3 % (ref 94–99)
PCO2 BLDA: 45.7 MM HG (ref 35–45)
PCO2 TEMP ADJ BLD: ABNORMAL MM[HG]
PH BLDA: 7.36 PH UNITS (ref 7.35–7.45)
PH, TEMP CORRECTED: ABNORMAL
PO2 BLDA: 66.8 MM HG (ref 75–100)
PO2 TEMP ADJ BLD: ABNORMAL MM[HG]
POTASSIUM SERPL-SCNC: 4.7 MMOL/L (ref 3.5–5.2)
SAO2 % BLDCOA: 94 % (ref 94–100)
SODIUM SERPL-SCNC: 134 MMOL/L (ref 136–145)
VENTILATOR MODE: ABNORMAL

## 2023-11-09 PROCEDURE — 94664 DEMO&/EVAL PT USE INHALER: CPT

## 2023-11-09 PROCEDURE — 94799 UNLISTED PULMONARY SVC/PX: CPT

## 2023-11-09 PROCEDURE — 36600 WITHDRAWAL OF ARTERIAL BLOOD: CPT

## 2023-11-09 PROCEDURE — 83050 HGB METHEMOGLOBIN QUAN: CPT

## 2023-11-09 PROCEDURE — 94660 CPAP INITIATION&MGMT: CPT

## 2023-11-09 PROCEDURE — 63710000001 PREDNISONE PER 1 MG: Performed by: FAMILY MEDICINE

## 2023-11-09 PROCEDURE — 82805 BLOOD GASES W/O2 SATURATION: CPT

## 2023-11-09 PROCEDURE — 99239 HOSP IP/OBS DSCHRG MGMT >30: CPT | Performed by: NURSE PRACTITIONER

## 2023-11-09 PROCEDURE — 80048 BASIC METABOLIC PNL TOTAL CA: CPT | Performed by: NURSE PRACTITIONER

## 2023-11-09 PROCEDURE — 82375 ASSAY CARBOXYHB QUANT: CPT

## 2023-11-09 RX ORDER — ALBUTEROL SULFATE 90 UG/1
2 AEROSOL, METERED RESPIRATORY (INHALATION) EVERY 4 HOURS PRN
Qty: 8.5 G | Refills: 0 | Status: SHIPPED | OUTPATIENT
Start: 2023-11-09

## 2023-11-09 RX ORDER — IPRATROPIUM BROMIDE AND ALBUTEROL SULFATE 2.5; .5 MG/3ML; MG/3ML
3 SOLUTION RESPIRATORY (INHALATION) EVERY 6 HOURS PRN
Status: DISCONTINUED | OUTPATIENT
Start: 2023-11-09 | End: 2023-11-09 | Stop reason: HOSPADM

## 2023-11-09 RX ORDER — NICOTINE 21 MG/24HR
1 PATCH, TRANSDERMAL 24 HOURS TRANSDERMAL EVERY 24 HOURS
Qty: 28 PATCH | Refills: 0 | Status: SHIPPED | OUTPATIENT
Start: 2023-11-10 | End: 2023-12-10

## 2023-11-09 RX ORDER — PREDNISONE 20 MG/1
20 TABLET ORAL DAILY
Qty: 1 TABLET | Refills: 0 | Status: SHIPPED | OUTPATIENT
Start: 2023-11-10 | End: 2023-11-11

## 2023-11-09 RX ADMIN — GABAPENTIN 600 MG: 300 CAPSULE ORAL at 06:34

## 2023-11-09 RX ADMIN — PREDNISONE 40 MG: 20 TABLET ORAL at 08:26

## 2023-11-09 RX ADMIN — LISINOPRIL 40 MG: 20 TABLET ORAL at 08:26

## 2023-11-09 RX ADMIN — BUDESONIDE AND FORMOTEROL FUMARATE DIHYDRATE 2 PUFF: 160; 4.5 AEROSOL RESPIRATORY (INHALATION) at 06:57

## 2023-11-09 RX ADMIN — IPRATROPIUM BROMIDE AND ALBUTEROL SULFATE 3 ML: .5; 3 SOLUTION RESPIRATORY (INHALATION) at 00:00

## 2023-11-09 RX ADMIN — Medication 1 PATCH: at 06:34

## 2023-11-09 RX ADMIN — IPRATROPIUM BROMIDE AND ALBUTEROL SULFATE 3 ML: .5; 3 SOLUTION RESPIRATORY (INHALATION) at 06:57

## 2023-11-09 RX ADMIN — FOLIC ACID 1 MG: 1 TABLET ORAL at 08:26

## 2023-11-09 RX ADMIN — CARVEDILOL 25 MG: 25 TABLET, FILM COATED ORAL at 08:26

## 2023-11-09 RX ADMIN — ESCITALOPRAM OXALATE 10 MG: 10 TABLET ORAL at 06:35

## 2023-11-09 NOTE — CASE MANAGEMENT/SOCIAL WORK
Case Management Discharge Note           Provided Post Acute Provider List?: N/A  Provided Post Acute Provider Quality & Resource List?: N/A    Selected Continued Care - Admitted Since 11/7/2023       Destination    No services have been selected for the patient.                Durable Medical Equipment Coordination complete.      Service Provider Selected Services Address Phone Fax Patient Preferred    [facility] [address] [place] [address] DR FRYE [address] [place] [id number] [phone] --       Internal Comment last updated by Mark Tubbs RN 11/9/2023 1307    Walker delivered                         Dialysis/Infusion    No services have been selected for the patient.                Home Medical Care    No services have been selected for the patient.                Therapy    No services have been selected for the patient.                Community Resources    No services have been selected for the patient.                Community & DME    No services have been selected for the patient.                    Transportation Services  Private: Car    Final Discharge Disposition Code: 01 - home or self-care

## 2023-11-09 NOTE — PAYOR COMM NOTE
"  D/c summary  Ur manager; nancy brewster [phone] and fax [phone]      Dharmesh Cary (52 y.o. Male)       Date of Birth   1970    Social Security Number       Address   [address] [place] [address]    Home Phone   [phone]    MRN   [id number]       Judaism   None    Marital Status                               Admission Date   11/7/23    Admission Type   Emergency    Admitting Provider   Lisa Dias DO    Attending Provider   Dre Salas MD    Department, Room/Bed   [facility] TELEMETRY SDS OVERFLOW, T03/01       Discharge Date       Discharge Disposition   Home or Self Care    Discharge Destination                                 Attending Provider: Dre Salas MD    Allergies: Not on File    Isolation: None   Infection: None   Code Status: CPR    Ht: 183 cm (72.05\")   Wt: 137 kg (301 lb 5.9 oz)    Admission Cmt: None   Principal Problem: Acute respiratory failure with hypoxia and hypercapnia [J96.01,J96.02]                   Active Insurance as of 11/7/2023       Primary Coverage       Payor Plan Insurance Group Employer/Plan Group    [employer] BLUE [employer]OSS [employer] BLUE [employer] BLUE [employer] PPO [id number]       Payor Plan Address Payor Plan Phone Number Payor Plan Fax Number Effective Dates    [address] [phone]  10/11/2023 - None Entered    [place] [address]         Subscriber Name Subscriber Birth Date Member ID       LAVONNE CARY 9/17/1980 [id number]                     Emergency Contacts        (Rel.) Home Phone Work Phone Mobile Phone    Lavonne Cary (Spouse) [phone] -- --              Physician Progress Notes (last 24 hours)  Notes from 11/08/23 1246 through 11/09/23 1246   No notes of this type exist for this encounter.          Discharge Summary        Maylin Pham, APRN at 11/09/23 1216              [facility] HOSPITALIST   DISCHARGE SUMMARY      Name:  Dharmesh Cary   Age:  52 " y.o.  Sex:  male  :  1970  MRN:  [id number]   Visit Number:  [id number]    Admission Date:  2023  Date of Discharge:  2023  Primary Care Physician:  Michelle Sky APRN    Important issues to note:    Admitted to the hospital for respiratory failure with hypercapnia/ hypoxia, suspected COPD exacerbation and alcohol intoxication/ abuse.  Placed on bipap, bronchodilators, and steroids with improvement and oxygen weaned quickly back to room air.  Patient advised that he probably uses his Cpap machine 1-2x/ week on average and feels like it pumps too much air and he ends up taking it off at some point.  Patient admits to 1PPD smoker for 30 years and heavy alcohol use nightly for quite some time.  Admits to problems with wife after being admitted that caused him to consume more than his usual prior to admission.    Echo obtained and noted left systolic function hyperdynamic with EF-77% and diastolic function normal.  Mental status remained stable. Repeat ABG stable.  Patient stable for discharge home today.  Will give one more day of Prednisone for COPD exacerbation and refill Albuterol rescue inhaler.  Ambulatory referral to COPD clinic, patient would benefit from PFTs and maintenance inhalers if needed.  Case management to assist in Cpap query per patient request to check settings.    Discussed in depth that he needs to wear his Cpap every night.  Discussed in depth alcohol and tobacco cessation--nicotine patches prescribed.  Patient declined speaking to behavioral health at discharge but was agreeable to contact information for after discharge.  Follow up with COPD clinic and PCP.  Return to the hospital with any increased shortness of breath, chest pain, or palpitations.    Walker at discharge--Patient has a mobility limitation that significantly impairs their ability to participate in one or more mobility- related activities of daily living. The patient is able to safely use the walker. The  functional mobility deficit can be sufficiently resolved by use of a walker.      Discharge Diagnoses:     Acute respiratory failure with hypoxia and hypercapnia, POA, resolved  Suspected COPD with exacerbation  Reported fall  Elevated troponin  Uncontrolled MADELAINE noncompliant with cpap  Alcohol intoxication and chronic use  Altered mental status, resolved  Chronic kidney disease stage III  Lupus  RA on [facility]  Chronic tobacco abuse    Problem List:     Active Hospital Problems    Diagnosis  POA    **Acute respiratory failure with hypoxia and hypercapnia [J96.01, J96.02]  Yes    COPD exacerbation [J44.1]  Yes    Acute respiratory failure with hypoxia [J96.01]  Yes      Resolved Hospital Problems   No resolved problems to display.     Presenting Problem:    Chief Complaint   Patient presents with    Head Injury      Consults:     Consulting Physician(s)                     None                History of presenting illness/Hospital Course:    The patient is a 52 year old female with a past medical history of MADELAINE with Cpap at home, tobacco use, chronic pain, chronic kidney disease stage III, hypertension, obesity, alcohol use, rheumatoid arthritis and lupus, who presented from home via EMS 11/7/2023 with multiple complaints. EMS had been called out after the patient apparently had had a fall and hit his head on a railing at home.  Appeared intoxicated upon arrival and was brought to the emergency room.  He was also noted to be hypoxic as well.  When asked about alcohol use, he notes he does drink quite frequently at nighttime.  He also admitted to having sleep apnea and supposed to use a CPAP/BiPAP but does not always.       Work up in the emergency department noted vital signs stable.  He was initially on nonrebreather mask and placed on BiPAP.  Labs with white count 9 hemoglobin 13 platelets 271.  proBNP of 100.  Ethanol of 0.236.  Procalcitonin 0.09.  Creatinine was 1.4.  Urinalysis unremarkable.  UDS positive  buprenorphine.  Initial troponin did elevate on repeat test, however decreased on a third troponin.  ABG notable for pH 7.210 PCO2 of 62.  CT of the cervical spine was unremarkable.  CT scan of the head without contrast was unremarkable.  Chest x-ray appeared to show some atelectasis otherwise no acute abnormality.  The patient was monitored in the emergency room for multiple hours, so oxygen had been weaned off a couple times, however he continued to show hypoxia and will drop into the mid upper 80s.     Admitted to the hospital and started on bronchodilators and steroids, continued on bipap therapy.  Patient advised that he probably uses his Cpap machine 1-2x/ week on average and feels like it pumps too much air and he ends up taking it off at some point.  Patient admits to 1PPD smoker for 30 years and heavy alcohol use nightly for quite some time.  Admits to problems with wife after being admitted that caused him to consume more than his usual prior to admission.  He had mildly elevated troponins likely type II in setting of hypercapnia and hypoxia.  Echo obtained and noted left systolic function hyperdynamic with EF-77% and diastolic function normal.  Mental status remained stable.  He was started on alcohol withdrawal protocol and given PRN Ativan.  His respiratory failure was likely multifactorial with uncontrolled sleep apnea, underlying COPD, alcohol abuse and intoxication, and chronic tobacco abuse.  Oxygen was weaned back to room air without difficulty.  Repeat ABG stable.    Patient stable for discharge home today.  Will give one more day of Prednisone for COPD exacerbation and refill Albuterol rescue inhaler.  Ambulatory referral to COPD clinic, patient would benefit from PFTs and maintenance inhalers if needed.  Case management to assist in Cpap query per patient request to check settings.  Discussed in depth that he needs to wear his Cpap every night.  Discussed in depth alcohol and tobacco  cessation--nicotine patches prescribed.  Patient declined speaking to behavioral health at discharge but was agreeable to contact information for after discharge.  Follow up with COPD clinic and PCP.  Return to the hospital with any increased shortness of breath, chest pain, or palpitations.    Vital Signs:    Temp:  [97.6 °F (36.4 °C)-98.5 °F (36.9 °C)] 97.9 °F (36.6 °C)  Heart Rate:  [44-94] 58  Resp:  [18-21] 18  BP: (121-160)/() 144/87    Physical Exam:    General Appearance:  Alert and cooperative, middle aged male, no acute distress on exam   Head:  Atraumatic and normocephalic.   Eyes: Conjunctivae and sclerae normal, no icterus. No pallor.   Ears:  Ears with no abnormalities noted.   Throat: No oral lesions, no thrush, oral mucosa moist.   Neck: Supple, trachea midline   Back:   No kyphoscoliosis present. No tenderness to palpation.   Lungs:   Breath sounds heard bilaterally equally.  No crackles or wheezing. Unlabored on room air   Heart:  Normal S1 and S2, no murmur, no gallop, no rub. No JVD.   Abdomen:   Normal bowel sounds, no masses, no organomegaly. Soft, nontender, nondistended, no rebound tenderness.   Extremities: Supple, no edema, no cyanosis, no clubbing.   Pulses: Pulses palpable bilaterally.   Skin: No bleeding or rash.   Neurologic: Alert and oriented x 3. No facial asymmetry. Moves all four limbs. No tremors.     Pertinent Lab Results:     Results from last 7 days   Lab Units 11/09/23  0535 11/07/23 2238   SODIUM mmol/L 134* 134*   POTASSIUM mmol/L 4.7 3.7   CHLORIDE mmol/L 97* 98   CO2 mmol/L 25.6 23.0   BUN mg/dL 23* 31*   CREATININE mg/dL 1.20 1.40*   CALCIUM mg/dL 9.8 8.2   BILIRUBIN mg/dL  --  0.3   ALK PHOS U/L  --  71   ALT (SGPT) U/L  --  41   AST (SGOT) U/L  --  40   GLUCOSE mg/dL 119* 115*     Results from last 7 days   Lab Units 11/07/23 2238   WBC 10*3/mm3 9.65   HEMOGLOBIN g/dL 13.9   HEMATOCRIT % 40.5   PLATELETS 10*3/mm3 271         Results from last 7 days   Lab Units  11/08/23  0312 11/08/23  0104 11/07/23 2238   HSTROP T ng/L 47* 54* 36*     Results from last 7 days   Lab Units 11/07/23 2238   PROBNP pg/mL 100.9             Results from last 7 days   Lab Units 11/09/23  0538   PH, ARTERIAL pH units 7.358   PO2 ART mm Hg 66.8*   PCO2, ARTERIAL mm Hg 45.7*   HCO3 ART mmol/L 25.7           Pertinent Radiology Results:    Imaging Results (All)       Procedure Component Value Units Date/Time    XR Chest 1 View [[id number]] Collected: 11/08/23 0112     Updated: 11/08/23 0114    Narrative:      FINAL REPORT    TECHNIQUE:  null    CLINICAL HISTORY:  soa    COMPARISON:  null    FINDINGS:  Single view of the chest    Comparison:    None    Findings: Low lung volumes. Otherwise normal heart, lungs and mediastinum. No evidence for pneumonia.      Impression:      Impression:    Low lung volumes.    Authenticated and Electronically Signed by Chace Perdomo MD on  11/08/2023 01:12:39 AM    CT Head Without Contrast [[id number]] Collected: 11/08/23 0005     Updated: 11/08/23 0006    Narrative:      FINAL REPORT    CLINICAL HISTORY:  fall, head injury    FINDINGS:  Axial images of the head were obtained without contrast. Coronal  reformatted images were also obtained.This study was performed  with techniques to keep radiation doses as low as reasonably  achievable (ALARA). Individualized dose reduction techniques  using automated exposure control or adjustment of mA and/or kV  according to the patient''s size were employed.  There is motion  on many images.  There is no evidence of intracranial hemorrhage  or mass. The ventricular size is within normal limits. There is  no evidence of shift of the midline structures. No abnormal  extra axial fluid collection is identified. No skull abnormality  is seen on the bone window images.  There is complete  opacification of the right maxillary sinus.      Impression:      No acute intracranial abnormality.    Authenticated and Electronically Signed by  Adria Garibay III, MD on 11/08/2023 12:05:11 AM    CT Cervical Spine Without Contrast [[id number]] Collected: 11/08/23 0005     Updated: 11/08/23 0006    Narrative:      FINAL REPORT    CLINICAL HISTORY:  fall    FINDINGS:  Axial CT images of the cervical spine were obtained without  contrast. Sagittal and coronal reformatted images were also  obtained. This study was performed with techniques to keep  radiation doses as low as reasonably achievable (ALARA).  Individualized dose reduction techniques using automated  exposure control or adjustment of mA and/or kV according to the  patient''s size were employed.  Motion artifact and patient body  habitus limits this study.  There is no definite evidence of  fracture or dislocation. The bony alignment is normal.  There  are moderate and severe degenerative changes, worst on the  cervical spine There is no evidence of canal stenosis. No  paraspinous soft tissue abnormality is seen. Limited images of  the upper thorax are unremarkable.      Impression:      No definite fracture or other acute bony abnormality identified.    Authenticated and Electronically Signed by Adria Garibay III, MD on 11/08/2023 12:05:18 AM            Echo:    Results for orders placed during the hospital encounter of 11/07/23    Adult Transthoracic Echo Complete With Contrast if Necessary Per Protocol    Interpretation Summary    Left ventricular systolic function is hyperdynamic (EF > 70%). Estimated left ventricular EF = 77% Left ventricular ejection fraction appears to be greater than 70%.    Left ventricular diastolic function was normal.    Condition on Discharge:      Stable.    Code status during the hospital stay:    Code Status and Medical Interventions:   Ordered at: 11/08/23 0555     Code Status (Patient has no pulse and is not breathing):    CPR (Attempt to Resuscitate)     Medical Interventions (Patient has pulse or is breathing):    Full Support     Discharge  Disposition:    Home or Self Care    Discharge Medications:       Discharge Medications        New Medications        Instructions Start Date   albuterol sulfate  (90 Base) MCG/ACT inhaler  Commonly known as: PROVENTIL HFA;VENTOLIN HFA;PROAIR HFA   Inhale 2 puffs by mouth Every 4 (Four) Hours As Needed for Wheezing or Shortness of Air.      nicotine 21 MG/24HR patch  Commonly known as: NICODERM CQ   Place 1 patch on the skin as directed by provider Daily   Start Date: November 10, 2023            Changes to Medications        Instructions Start Date   escitalopram 20 MG tablet  Commonly known as: LEXAPRO  What changed: Another medication with the same name was removed. Continue taking this medication, and follow the directions you see here.   20 mg, Oral, Every Morning      predniSONE 10 MG tablet  Commonly known as: DELTASONE  What changed: Another medication with the same name was added. Make sure you understand how and when to take each.   10 mg, Oral, Daily, One tablet daily for 2-5 days as needed for flare up      predniSONE 20 MG tablet  Commonly known as: DELTASONE  What changed: You were already taking a medication with the same name, and this prescription was added. Make sure you understand how and when to take each.   20 mg, Oral, Daily   Start Date: November 10, 2023            Continue These Medications        Instructions Start Date   amLODIPine 10 MG tablet  Commonly known as: NORVASC   10 mg, Oral, Daily      carvedilol 25 MG tablet  Commonly known as: COREG   25 mg, Oral, 2 Times Daily With Meals      chlorthalidone 25 MG tablet  Commonly known as: HYGROTON   1 tablet, Oral, Daily      folic acid 1 MG tablet  Commonly known as: FOLVITE   1 tablet, Oral, Daily      gabapentin 600 MG tablet  Commonly known as: NEURONTIN   600 mg, Oral, 3 Times Daily      hydroxychloroquine 200 MG tablet  Commonly known as: PLAQUENIL   200 mg, Oral, Daily      lisinopril 40 MG tablet  Commonly known as:  PRINIVIL,ZESTRIL   40 mg, Oral, Daily      methotrexate 2.5 MG tablet   15 mg, Oral, Weekly      torsemide 10 MG tablet  Commonly known as: DEMADEX   10 mg, Oral, Daily      traMADol 50 MG tablet  Commonly known as: ULTRAM   50 mg, Oral, Every 12 Hours PRN             Discharge Diet:     Diet Instructions       Diet: Regular/House Diet; Thin (IDDSI 0)      Discharge Diet: Regular/House Diet    Fluid Consistency: Thin (IDDSI 0)          Activity at Discharge:     Activity Instructions       Activity as Tolerated            Follow-up Appointments:    Additional Instructions for the Follow-ups that You Need to Schedule       Ambulatory Referral to Disease State Management   As directed      To dept: [facility] [[id number]]   What program(s) are you referring for?: COPD   Follow-up needed: Yes        Ambulatory Referral to Home Health (Hospital)   As directed      Face to Face Visit Date: 11/9/2023   Follow-up provider for Plan of Care?: I treated the patient in an acute care facility and will not continue treatment after discharge.   Follow-up provider: ALEX SKY [[id number]]   Reason/Clinical Findings: debility   Describe mobility limitations that make leaving home difficult: debility   Nursing/Therapeutic Services Requested: Physical Therapy Dietician Skilled Nursing   Skilled nursing orders: Pain management COPD management Cardiopulmonary assessments   PT orders: Therapeutic exercise Transfer training Strengthening Home safety assessment Gait Training   Weight Bearing Status: As Tolerated   Dietician orders: Diet instruction   Frequency: 1 Week 1               Follow-up Information       Alex Sky APRN Follow up.    Specialties: Nurse Practitioner, Family Medicine, Hospitalist  Why: Keep f/u appt next week  11/15/2023 at 3:00pm  Contact information:  [place] [address] [phone]               [facility] Follow up.    Specialties: Behavioral Health,  Psychiatry  Why: Call to schedule appt for therapy/ request resources for alcohol cessation  Contact information:  [address]  [facility] [address] [phone]                         Future Appointments   Date Time Provider Department Center   11/15/2023  3:00 PM Michelle Sky APRN [facility]  11/09/23  12:40 EST    Time: I spent 35 minutes on this discharge activity which included: face-to-face encounter with the patient, reviewing the data in the system, coordination of the care with the nursing staff as well as consultants, documentation, and entering orders.     Dictated utilizing Dragon dictation.      Electronically signed by Maylin Pham APRN at 11/09/23 8006

## 2023-11-09 NOTE — CASE MANAGEMENT/SOCIAL WORK
Case Management/Social Work    Patient Name:  Dharmesh Samuel  YOB: 1970  MRN: [id number]  Admit Date:  11/7/2023        Met with patient at bedside. He asked for assistance with getting a walker at [place]. No other needs identified at this time.      Electronically signed by:  Mark Tubbs RN  11/09/23 08:34 EST

## 2023-11-09 NOTE — DISCHARGE SUMMARY
[facility]   DISCHARGE SUMMARY      Name:  Dharmesh Samuel   Age:  52 y.o.  Sex:  male  :  1970  MRN:  [id number]   Visit Number:  [id number]    Admission Date:  2023  Date of Discharge:  2023  Primary Care Physician:  Michelle Sky APRN    Important issues to note:    Admitted to the hospital for respiratory failure with hypercapnia/ hypoxia, suspected COPD exacerbation and alcohol intoxication/ abuse.  Placed on bipap, bronchodilators, and steroids with improvement and oxygen weaned quickly back to room air.  Patient advised that he probably uses his Cpap machine 1-2x/ week on average and feels like it pumps too much air and he ends up taking it off at some point.  Patient admits to 1PPD smoker for 30 years and heavy alcohol use nightly for quite some time.  Admits to problems with wife after being admitted that caused him to consume more than his usual prior to admission.    Echo obtained and noted left systolic function hyperdynamic with EF-77% and diastolic function normal.  Mental status remained stable. Repeat ABG stable.  Patient stable for discharge home today.  Will give one more day of Prednisone for COPD exacerbation and refill Albuterol rescue inhaler.  Ambulatory referral to COPD clinic, patient would benefit from PFTs and maintenance inhalers if needed.  Case management to assist in Cpap query per patient request to check settings.    Discussed in depth that he needs to wear his Cpap every night.  Discussed in depth alcohol and tobacco cessation--nicotine patches prescribed.  Patient declined speaking to behavioral health at discharge but was agreeable to contact information for after discharge.  Follow up with COPD clinic and PCP.  Return to the hospital with any increased shortness of breath, chest pain, or palpitations.    Walker at discharge--Patient has a mobility limitation that significantly impairs their ability to participate in one or more  mobility- related activities of daily living. The patient is able to safely use the walker. The functional mobility deficit can be sufficiently resolved by use of a walker.      Discharge Diagnoses:     Acute respiratory failure with hypoxia and hypercapnia, POA, resolved  Suspected COPD with exacerbation  Reported fall  Elevated troponin  Uncontrolled MADELAINE noncompliant with cpap  Alcohol intoxication and chronic use  Altered mental status, resolved  Chronic kidney disease stage III  Lupus  [facility]  Chronic tobacco abuse    Problem List:     Active Hospital Problems    Diagnosis  POA    **Acute respiratory failure with hypoxia and hypercapnia [J96.01, J96.02]  Yes    COPD exacerbation [J44.1]  Yes    Acute respiratory failure with hypoxia [J96.01]  Yes      Resolved Hospital Problems   No resolved problems to display.     Presenting Problem:    Chief Complaint   Patient presents with    Head Injury      Consults:     Consulting Physician(s)                     None                History of presenting illness/Hospital Course:    The patient is a 52 year old female with a past medical history of MADELAINE with Cpap at home, tobacco use, chronic pain, chronic kidney disease stage III, hypertension, obesity, alcohol use, rheumatoid arthritis and lupus, who presented from home via EMS 11/7/2023 with multiple complaints. EMS had been called out after the patient apparently had had a fall and hit his head on a railing at home.  Appeared intoxicated upon arrival and was brought to the emergency room.  He was also noted to be hypoxic as well.  When asked about alcohol use, he notes he does drink quite frequently at nighttime.  He also admitted to having sleep apnea and supposed to use a CPAP/BiPAP but does not always.       Work up in the emergency department noted vital signs stable.  He was initially on nonrebreather mask and placed on BiPAP.  Labs with white count 9 hemoglobin 13 platelets 271.  proBNP of 100.  Ethanol of  0.236.  Procalcitonin 0.09.  Creatinine was 1.4.  Urinalysis unremarkable.  UDS positive buprenorphine.  Initial troponin did elevate on repeat test, however decreased on a third troponin.  ABG notable for pH 7.210 PCO2 of 62.  CT of the cervical spine was unremarkable.  CT scan of the head without contrast was unremarkable.  Chest x-ray appeared to show some atelectasis otherwise no acute abnormality.  The patient was monitored in the emergency room for multiple hours, so oxygen had been weaned off a couple times, however he continued to show hypoxia and will drop into the mid upper 80s.     Admitted to the hospital and started on bronchodilators and steroids, continued on bipap therapy.  Patient advised that he probably uses his Cpap machine 1-2x/ week on average and feels like it pumps too much air and he ends up taking it off at some point.  Patient admits to 1PPD smoker for 30 years and heavy alcohol use nightly for quite some time.  Admits to problems with wife after being admitted that caused him to consume more than his usual prior to admission.  He had mildly elevated troponins likely type II in setting of hypercapnia and hypoxia.  Echo obtained and noted left systolic function hyperdynamic with EF-77% and diastolic function normal.  Mental status remained stable.  He was started on alcohol withdrawal protocol and given PRN Ativan.  His respiratory failure was likely multifactorial with uncontrolled sleep apnea, underlying COPD, alcohol abuse and intoxication, and chronic tobacco abuse.  Oxygen was weaned back to room air without difficulty.  Repeat ABG stable.    Patient stable for discharge home today.  Will give one more day of Prednisone for COPD exacerbation and refill Albuterol rescue inhaler.  Ambulatory referral to COPD clinic, patient would benefit from PFTs and maintenance inhalers if needed.  Case management to assist in Cpap query per patient request to check settings.  Discussed in depth that  he needs to wear his Cpap every night.  Discussed in depth alcohol and tobacco cessation--nicotine patches prescribed.  Patient declined speaking to behavioral health at discharge but was agreeable to contact information for after discharge.  Follow up with COPD clinic and PCP.  Return to the hospital with any increased shortness of breath, chest pain, or palpitations.    Vital Signs:    Temp:  [97.6 °F (36.4 °C)-98.5 °F (36.9 °C)] 97.9 °F (36.6 °C)  Heart Rate:  [44-94] 58  Resp:  [18-21] 18  BP: (121-160)/() 144/87    Physical Exam:    General Appearance:  Alert and cooperative, middle aged male, no acute distress on exam   Head:  Atraumatic and normocephalic.   Eyes: Conjunctivae and sclerae normal, no icterus. No pallor.   Ears:  Ears with no abnormalities noted.   Throat: No oral lesions, no thrush, oral mucosa moist.   Neck: Supple, trachea midline   Back:   No kyphoscoliosis present. No tenderness to palpation.   Lungs:   Breath sounds heard bilaterally equally.  No crackles or wheezing. Unlabored on room air   Heart:  Normal S1 and S2, no murmur, no gallop, no rub. No JVD.   Abdomen:   Normal bowel sounds, no masses, no organomegaly. Soft, nontender, nondistended, no rebound tenderness.   Extremities: Supple, no edema, no cyanosis, no clubbing.   Pulses: Pulses palpable bilaterally.   Skin: No bleeding or rash.   Neurologic: Alert and oriented x 3. No facial asymmetry. Moves all four limbs. No tremors.     Pertinent Lab Results:     Results from last 7 days   Lab Units 11/09/23  0535 11/07/23  2238   SODIUM mmol/L 134* 134*   POTASSIUM mmol/L 4.7 3.7   CHLORIDE mmol/L 97* 98   CO2 mmol/L 25.6 23.0   BUN mg/dL 23* 31*   CREATININE mg/dL 1.20 1.40*   CALCIUM mg/dL 9.8 8.2   BILIRUBIN mg/dL  --  0.3   ALK PHOS U/L  --  71   ALT (SGPT) U/L  --  41   AST (SGOT) U/L  --  40   GLUCOSE mg/dL 119* 115*     Results from last 7 days   Lab Units 11/07/23  2238   WBC 10*3/mm3 9.65   HEMOGLOBIN g/dL 13.9    HEMATOCRIT % 40.5   PLATELETS 10*3/mm3 271         Results from last 7 days   Lab Units 11/08/23  0312 11/08/23  0104 11/07/23 2238   HSTROP T ng/L 47* 54* 36*     Results from last 7 days   Lab Units 11/07/23 2238   PROBNP pg/mL 100.9             Results from last 7 days   Lab Units 11/09/23  0538   PH, ARTERIAL pH units 7.358   PO2 ART mm Hg 66.8*   PCO2, ARTERIAL mm Hg 45.7*   HCO3 ART mmol/L 25.7           Pertinent Radiology Results:    Imaging Results (All)       Procedure Component Value Units Date/Time    XR Chest 1 View [[id number]] Collected: 11/08/23 0112     Updated: 11/08/23 0114    Narrative:      FINAL REPORT    TECHNIQUE:  null    CLINICAL HISTORY:  soa    COMPARISON:  null    FINDINGS:  Single view of the chest    Comparison:    None    Findings: Low lung volumes. Otherwise normal heart, lungs and mediastinum. No evidence for pneumonia.      Impression:      Impression:    Low lung volumes.    Authenticated and Electronically Signed by Chace Perdomo MD on  11/08/2023 01:12:39 AM    CT Head Without Contrast [[id number]] Collected: 11/08/23 0005     Updated: 11/08/23 0006    Narrative:      FINAL REPORT    CLINICAL HISTORY:  fall, head injury    FINDINGS:  Axial images of the head were obtained without contrast. Coronal  reformatted images were also obtained.This study was performed  with techniques to keep radiation doses as low as reasonably  achievable (ALARA). Individualized dose reduction techniques  using automated exposure control or adjustment of mA and/or kV  according to the patient''s size were employed.  There is motion  on many images.  There is no evidence of intracranial hemorrhage  or mass. The ventricular size is within normal limits. There is  no evidence of shift of the midline structures. No abnormal  extra axial fluid collection is identified. No skull abnormality  is seen on the bone window images.  There is complete  opacification of the right maxillary sinus.       Impression:      No acute intracranial abnormality.    Authenticated and Electronically Signed by Adria Garibay III, MD on 11/08/2023 12:05:11 AM    CT Cervical Spine Without Contrast [[id number]] Collected: 11/08/23 0005     Updated: 11/08/23 0006    Narrative:      FINAL REPORT    CLINICAL HISTORY:  fall    FINDINGS:  Axial CT images of the cervical spine were obtained without  contrast. Sagittal and coronal reformatted images were also  obtained. This study was performed with techniques to keep  radiation doses as low as reasonably achievable (ALARA).  Individualized dose reduction techniques using automated  exposure control or adjustment of mA and/or kV according to the  patient''s size were employed.  Motion artifact and patient body  habitus limits this study.  There is no definite evidence of  fracture or dislocation. The bony alignment is normal.  There  are moderate and severe degenerative changes, worst on the  cervical spine There is no evidence of canal stenosis. No  paraspinous soft tissue abnormality is seen. Limited images of  the upper thorax are unremarkable.      Impression:      No definite fracture or other acute bony abnormality identified.    Authenticated and Electronically Signed by Adria Garibay III, MD on 11/08/2023 12:05:18 AM            Echo:    Results for orders placed during the hospital encounter of 11/07/23    Adult Transthoracic Echo Complete With Contrast if Necessary Per Protocol    Interpretation Summary    Left ventricular systolic function is hyperdynamic (EF > 70%). Estimated left ventricular EF = 77% Left ventricular ejection fraction appears to be greater than 70%.    Left ventricular diastolic function was normal.    Condition on Discharge:      Stable.    Code status during the hospital stay:    Code Status and Medical Interventions:   Ordered at: 11/08/23 0555     Code Status (Patient has no pulse and is not breathing):    CPR (Attempt to Resuscitate)      Medical Interventions (Patient has pulse or is breathing):    Full Support     Discharge Disposition:    Home or Self Care    Discharge Medications:       Discharge Medications        New Medications        Instructions Start Date   albuterol sulfate  (90 Base) MCG/ACT inhaler  Commonly known as: PROVENTIL HFA;VENTOLIN HFA;PROAIR HFA   Inhale 2 puffs by mouth Every 4 (Four) Hours As Needed for Wheezing or Shortness of Air.      nicotine 21 MG/24HR patch  Commonly known as: NICODERM CQ   Place 1 patch on the skin as directed by provider Daily   Start Date: November 10, 2023            Changes to Medications        Instructions Start Date   escitalopram 20 MG tablet  Commonly known as: LEXAPRO  What changed: Another medication with the same name was removed. Continue taking this medication, and follow the directions you see here.   20 mg, Oral, Every Morning      predniSONE 10 MG tablet  Commonly known as: DELTASONE  What changed: Another medication with the same name was added. Make sure you understand how and when to take each.   10 mg, Oral, Daily, One tablet daily for 2-5 days as needed for flare up      predniSONE 20 MG tablet  Commonly known as: DELTASONE  What changed: You were already taking a medication with the same name, and this prescription was added. Make sure you understand how and when to take each.   20 mg, Oral, Daily   Start Date: November 10, 2023            Continue These Medications        Instructions Start Date   amLODIPine 10 MG tablet  Commonly known as: NORVASC   10 mg, Oral, Daily      carvedilol 25 MG tablet  Commonly known as: COREG   25 mg, Oral, 2 Times Daily With Meals      chlorthalidone 25 MG tablet  Commonly known as: HYGROTON   1 tablet, Oral, Daily      folic acid 1 MG tablet  Commonly known as: FOLVITE   1 tablet, Oral, Daily      gabapentin 600 MG tablet  Commonly known as: NEURONTIN   600 mg, Oral, 3 Times Daily      hydroxychloroquine 200 MG tablet  Commonly known as:  PLAQUENIL   200 mg, Oral, Daily      lisinopril 40 MG tablet  Commonly known as: PRINIVIL,ZESTRIL   40 mg, Oral, Daily      methotrexate 2.5 MG tablet   15 mg, Oral, Weekly      torsemide 10 MG tablet  Commonly known as: DEMADEX   10 mg, Oral, Daily      traMADol 50 MG tablet  Commonly known as: ULTRAM   50 mg, Oral, Every 12 Hours PRN             Discharge Diet:     Diet Instructions       Diet: Regular/House Diet; Thin (IDDSI 0)      Discharge Diet: Regular/House Diet    Fluid Consistency: Thin (IDDSI 0)          Activity at Discharge:     Activity Instructions       Activity as Tolerated            Follow-up Appointments:    Additional Instructions for the Follow-ups that You Need to Schedule       Ambulatory Referral to Disease State Management   As directed      To dept:  [facility] [[id number]]   What program(s) are you referring for?: COPD   Follow-up needed: Yes        Ambulatory Referral to Home Health (Hospital)   As directed      Face to Face Visit Date: 11/9/2023   Follow-up provider for Plan of Care?: I treated the patient in an acute care facility and will not continue treatment after discharge.   Follow-up provider: ALEX SKY [[id number]]   Reason/Clinical Findings: debility   Describe mobility limitations that make leaving home difficult: debility   Nursing/Therapeutic Services Requested: Physical Therapy Dietician Skilled Nursing   Skilled nursing orders: Pain management COPD management Cardiopulmonary assessments   PT orders: Therapeutic exercise Transfer training Strengthening Home safety assessment Gait Training   Weight Bearing Status: As Tolerated   Dietician orders: Diet instruction   Frequency: 1 Week 1               Follow-up Information       Alex Sky APRN Follow up.    Specialties: Nurse Practitioner, Family Medicine, Hospitalist  Why: Keep f/u appt next week  11/15/2023 at 3:00pm  Contact information:  [place] [address] [phone]                ARI BEHAV TH JASON Follow up.    Specialties: Behavioral Health, Psychiatry  Why: Call to schedule appt for therapy/ request resources for alcohol cessation  Contact information:  [address] [address] [phone]                         Future Appointments   Date Time Provider Department Center   11/15/2023  3:00 PM Michelle Sky APRN [facility] Stephanie B Hamm, APRN  11/09/23  12:40 EST    Time: I spent 35 minutes on this discharge activity which included: face-to-face encounter with the patient, reviewing the data in the system, coordination of the care with the nursing staff as well as consultants, documentation, and entering orders.     Dictated utilizing Dragon dictation.

## 2023-11-09 NOTE — DISCHARGE INSTRUCTIONS
You are being discharged home today    You may resume home medications.  Albuterol rescue inhaler has been ordered.  Take Prednisone for one more day as prescribed.  Consider reducing frequency or dose of chronic Tramadol and Gabapentin.    Follow up with your PCP as scheduled next week    Ambulatory referral to COPD clinic for further investigation of COPD    WEAR YOUR CPAP NIGHTLY!  EVERY NIGHT.    Use Nicotine patches to assist with smoking cessation.    Behavioral Health information provided.  Counseling regarding your life stressors and alcohol abuse references may be beneficial.  Call to make appointment.    Your health would be markedly improved if you would consider stopping alcohol and tobacco use.    Return to the hospital with any increased shortness of breath, chest pain, or palpitations.

## 2023-11-09 NOTE — OUTREACH NOTE
Prep Survey      Flowsheet Row Responses   [facility] facility patient discharged from? [facility]   Is LACE score < 7 ? No   Eligibility [facility]   Date of Admission 11/08/23   Date of Discharge 11/09/23   Discharge Disposition Home or Self Care   Discharge diagnosis Acute resp failure   Does the patient have one of the following disease processes/diagnoses(primary or secondary)? Other   Does the patient have Home health ordered? Yes   What is the Home health agency?  [facility]    Is there a DME ordered? Yes   What DME was ordered? [employer] for DME   Prep survey completed? Yes            WASHINGTON MALIK - Registered Nurse

## 2023-11-10 ENCOUNTER — TRANSITIONAL CARE MANAGEMENT TELEPHONE ENCOUNTER (OUTPATIENT)
Dept: CALL CENTER | Facility: HOSPITAL | Age: 53
End: 2023-11-10
Payer: COMMERCIAL

## 2023-11-10 NOTE — OUTREACH NOTE
Call Center TCM Note      Flowsheet Row Responses   [facility] patient discharged from? [facility]   Does the patient have one of the following disease processes/diagnoses(primary or secondary)? Other   TCM attempt successful? Yes  [verbal release ]   Call start time 121   Call end time 121   Discharge diagnosis Acute resp failure   Meds reviewed with patient/caregiver? Yes   Is the patient having any side effects they believe may be caused by any medication additions or changes? No   Does the patient have all medications ordered at discharge? Yes   Is the patient taking all medications as directed (includes completed medication regime)? Yes   Comments Hospital f/u on 11/15/23@300pm   Does the patient have an appointment with their PCP within 7-14 days of discharge? Yes   What is the Home health agency?  [facility]   Has home health visited the patient within 72 hours of discharge? Unsure   What DME was ordered? [facility]   Has all DME been delivered? Yes   Did the patient receive a copy of their discharge instructions? Yes   Nursing interventions Reviewed instructions with patient   What is the patient's perception of their health status since discharge? Same  [Pt still resting this am--reports he feels the same as he always does and denies questions at present time. Encouraged to monitor for worsening resp issues and seek care prn, take meds as ordered and keep f/u appt.]   Is the patient/caregiver able to teach back signs and symptoms related to disease process for when to call PCP? Yes   Is the patient/caregiver able to teach back signs and symptoms related to disease process for when to call 911? Yes   If the patient is a current smoker, are they able to teach back resources for cessation? Smoking cessation medications   TCM call completed? Yes   Call end time 1219            Manda Gorman RN    11/10/2023, 12:22 EST

## 2023-11-14 NOTE — DISCHARGE PLACEMENT REQUEST
" referral  Dharmesh Samuel (53 y.o. Male)       Date of Birth   1970    Social Security Number       Address   [address] [place] [address]    Home Phone   [phone]    MRN   [id number]       Restoration   None    Marital Status                               Admission Date   23    Admission Type   Emergency    Admitting Provider   Lisa Dias DO    Attending Provider       Department, Room/Bed   [facility] TELEMETRY SDS OVERFLOW, T03       Discharge Date   2023    Discharge Disposition   Home or Self Care    Discharge Destination                                 Attending Provider: (none)   Allergies: No Known Allergies    Isolation: None   Infection: None   Code Status: Prior    Ht: 183 cm (72.05\")   Wt: 137 kg (301 lb 5.9 oz)    Admission Cmt: None   Principal Problem: Acute respiratory failure with hypoxia and hypercapnia [J96.01,J96.02]                   Active Insurance as of 2023       Primary Coverage       Payor Plan Insurance Group Employer/Plan Group    [employer] BL[employer]OSS [facility] [employer] BLUE [facility] PPO [id number]       Payor Plan Address Payor Plan Phone Number Payor Plan Fax Number Effective Dates    [address] [phone]  10/11/2023 - None Entered    [place] [address]         Subscriber Name Subscriber Birth Date Member ID       KENYATTA SAMUEL 1979 [id number]                     Emergency Contacts            No emergency contacts on file.                 History & Physical        Lisa Dias DO at 23 0541            [facility] HOSPITALIST   HISTORY AND PHYSICAL      Name:  Dharmesh Samuel   Age:  52 y.o.  Sex:  male  :  1970  MRN:  [id number]   Visit Number:  [id number]  Admission Date:  2023  Date Of Service:  23  Primary Care Physician:  Michelle Sky APRN    Chief Complaint:     Confusion, low oxygen    History Of Presenting Illness:      The patient is an " apparent 52-year-old with a medical history of MADELAINE, tobacco use, chronic pain, chronic kidney disease stage III, hypertension, obesity, alcohol use, who presented from home via EMS with apparently multiple complaints.  History obtained from ER record, provider, patient.  Per report, EMS had been called out after the patient apparently had had a fall and hit his head on a railing at home.  Unsure if he lost consciousness.  He had apparently appeared intoxicated upon arrival and was brought to the emergency room.  He was also noted to be hypoxic per report.  At time of my visit, the patient was awake, alert, able to follow commands.  He was unaware of how he got to the hospital.  When asked about alcohol use, he notes he does drink quite frequently at nighttime.  He also admitted to having sleep apnea and supposed to use a CPAP/BiPAP but does not always.  He is unsure of where he was at tonight prior to coming in.  He has had some shortness of breath but denies any chest pain, fever, cough.  He was asking if he can go outside and smoke a cigarette.    In the ER, patient's blood pressure was stable.  He is temperature was 97 his heart rate was in the 60 range.  He was initially on nonrebreather mask and placed on BiPAP.  Labs with white count 9 hemoglobin 13 platelets 271.  proBNP of 100.  Ethanol of 0.236.  Procalcitonin 0.09.  Creatinine was 1.4.  Urinalysis unremarkable.  UDS positive buprenorphine.  Initial troponin did elevate on repeat test, however decreased on a third troponin.  ABG notable for pH 7.210 PCO2 of 62.  CT of the cervical spine was unremarkable.  CT scan of the head without contrast was unremarkable.  Chest x-ray appeared to show some atelectasis otherwise no acute abnormality.  The patient was monitored in the emergency room for multiple hours, so oxygen had been weaned off a couple times, however he continued to show hypoxia and will drop into the mid upper 80s.  We were subsequent asked admit  "thereafter.    Review Of Systems:    All systems were reviewed and negative except as mentioned in history of presenting illness, assessment and plan.    Past Medical History: Patient  has no past medical history on file.    Past Surgical History: Patient  has no past surgical history on file.    Social History: Patient      Family History:  Patient's family history has been reviewed and found to be noncontributory.     Allergies:      Patient has no allergy information on record.    Home Medications:    Prior to Admission Medications       None          ED Medications:    Medications   acetaminophen (TYLENOL) tablet 650 mg (650 mg Oral Given 11/8/23 0310)   ipratropium-albuterol (DUO-NEB) nebulizer solution 3 mL (has no administration in time range)   methylPREDNISolone sodium succinate (SOLU-Medrol) injection 125 mg (has no administration in time range)     Vital Signs:  Temp:  [96.5 °F (35.8 °C)] 96.5 °F (35.8 °C)  Heart Rate:  [49-68] 51  Resp:  [14] 14  BP: ()/(58-76) 107/71        11/07/23 2232   Weight: 136 kg (300 lb)     Body mass index is 40.69 kg/m².    Physical Exam:     Most recent vital Signs: /71   Pulse 51   Temp 96.5 °F (35.8 °C) (Axillary)   Resp 14   Ht 182.9 cm (72\")   Wt 136 kg (300 lb)   SpO2 97%   BMI 40.69 kg/m²     Physical Exam  Constitutional:       Appearance: He is obese. He is not toxic-appearing or diaphoretic.   Eyes:      Extraocular Movements: Extraocular movements intact.      Conjunctiva/sclera: Conjunctivae normal.      Pupils: Pupils are equal, round, and reactive to light.   Cardiovascular:      Rate and Rhythm: Normal rate and regular rhythm.      Pulses: Normal pulses.      Heart sounds: No murmur heard.     No gallop.   Pulmonary:      Breath sounds: No stridor. Wheezing present. No rhonchi or rales.      Comments: Mildly tachypneic at rest  Abdominal:      General: Abdomen is flat. Bowel sounds are normal. There is no distension.      Tenderness: There " is no abdominal tenderness. There is no guarding.   Musculoskeletal:      Right lower leg: No edema.      Left lower leg: No edema.   Skin:     General: Skin is warm.      Capillary Refill: Capillary refill takes less than 2 seconds.      Findings: No lesion or rash.   Neurological:      General: No focal deficit present.      Mental Status: He is alert and oriented to person, place, and time. Mental status is at baseline.      Motor: Weakness present.   Psychiatric:         Mood and Affect: Mood normal.         Thought Content: Thought content normal.         Laboratory data:    I have reviewed the labs done in the emergency room.    Results from last 7 days   Lab Units 11/07/23  2238   SODIUM mmol/L 134*   POTASSIUM mmol/L 3.7   CHLORIDE mmol/L 98   CO2 mmol/L 23.0   BUN mg/dL 31*   CREATININE mg/dL 1.40*   CALCIUM mg/dL 8.2   BILIRUBIN mg/dL 0.3   ALK PHOS U/L 71   ALT (SGPT) U/L 41   AST (SGOT) U/L 40   GLUCOSE mg/dL 115*     Results from last 7 days   Lab Units 11/07/23  2238   WBC 10*3/mm3 9.65   HEMOGLOBIN g/dL 13.9   HEMATOCRIT % 40.5   PLATELETS 10*3/mm3 271         Results from last 7 days   Lab Units 11/08/23  0312 11/08/23  0104 11/07/23  2238   HSTROP T ng/L 47* 54* 36*     Results from last 7 days   Lab Units 11/07/23  2238   PROBNP pg/mL 100.9             Results from last 7 days   Lab Units 11/08/23  0409   PH, ARTERIAL pH units 7.253*   PO2 ART mm Hg 45.9*   PCO2, ARTERIAL mm Hg 52.1*   HCO3 ART mmol/L 23.0     Results from last 7 days   Lab Units 11/07/23  2314   COLOR UA  Yellow   GLUCOSE UA  Negative   KETONES UA  Negative   BLOOD UA  Negative   LEUKOCYTES UA  Negative   PH, URINE  5.5   BILIRUBIN UA  Negative   UROBILINOGEN UA  0.2 E.U./dL       Pain Management Panel          Latest Ref Rng & Units 11/7/2023   Pain Management Panel   Amphetamine, Urine Qual Negative Negative    Barbiturates Screen, Urine Negative Negative    Benzodiazepine Screen, Urine Negative Negative    Buprenorphine,  Screen, Urine Negative Positive    Cocaine Screen, Urine Negative Negative    Fentanyl, Urine Negative Negative    Methadone Screen , Urine Negative Negative    Methamphetamine, Ur Negative Negative        EKG:      Appear to be a sinus rhythm with a normal rate.  No acute ST elevation or T wave changes.    Radiology:    XR Chest 1 View    Result Date: 11/8/2023  FINAL REPORT TECHNIQUE: null CLINICAL HISTORY: soa COMPARISON: null FINDINGS: Single view of the chest Comparison: None Findings: Low lung volumes. Otherwise normal heart, lungs and mediastinum. No evidence for pneumonia.     Impression: Low lung volumes. Authenticated and Electronically Signed by Chace Perdomo MD on 11/08/2023 01:12:39 AM    CT Cervical Spine Without Contrast    Result Date: 11/8/2023  FINAL REPORT CLINICAL HISTORY: fall FINDINGS: Axial CT images of the cervical spine were obtained without contrast. Sagittal and coronal reformatted images were also obtained. This study was performed with techniques to keep radiation doses as low as reasonably achievable (ALARA). Individualized dose reduction techniques using automated exposure control or adjustment of mA and/or kV according to the patient''s size were employed.  Motion artifact and patient body habitus limits this study.  There is no definite evidence of fracture or dislocation. The bony alignment is normal.  There are moderate and severe degenerative changes, worst on the cervical spine There is no evidence of canal stenosis. No paraspinous soft tissue abnormality is seen. Limited images of the upper thorax are unremarkable.     No definite fracture or other acute bony abnormality identified. Authenticated and Electronically Signed by Adria Garibay III, MD on 11/08/2023 12:05:18 AM    CT Head Without Contrast    Result Date: 11/8/2023  FINAL REPORT CLINICAL HISTORY: fall, head injury FINDINGS: Axial images of the head were obtained without contrast. Coronal reformatted images were  also obtained.This study was performed with techniques to keep radiation doses as low as reasonably achievable (ALARA). Individualized dose reduction techniques using automated exposure control or adjustment of mA and/or kV according to the patient''s size were employed.  There is motion on many images.  There is no evidence of intracranial hemorrhage or mass. The ventricular size is within normal limits. There is no evidence of shift of the midline structures. No abnormal extra axial fluid collection is identified. No skull abnormality is seen on the bone window images.  There is complete opacification of the right maxillary sinus.     No acute intracranial abnormality. Authenticated and Electronically Signed by Adria Garibay III, MD on 11/08/2023 12:05:11 AM     Assessment:    Acute respiratory failure with hypoxia and hypercapnia, POA  Suspected COPD with exacerbation  Reported fall  Elevated troponin  Uncontrolled MADELAINE  Alcohol intoxication  Altered mental status, resolved  Chronic kidney disease stage III  Lupus  RA  Chronic tobacco abuse    Plan:    Admit as observation    Respiratory failure:  Likely combination of uncontrolled sleep apnea in setting of ongoing tobacco abuse and alcohol intoxication.  He is also on multiple sedating medications at home including gabapentin, tramadol, and was noted to be positive for buprenorphine in UDS.  Have placed on steroids and bronchodilators for now.  Does not appear to be overloaded or have aspirated.  No indication for antibiotics.  Wean oxygen as tolerated.  Continue with BiPAP.    Elevated troponin:  Suspect type II demand related in setting of hypercapnia and hypoxia.  Monitor on telemetry.  We will add a 2D echo.    Reported fall:  Patient unaware of fall tonight per his report, but notes he has had multiple falls which he attributes to knee issues due to his arthritis.  He does have some mild abrasions on the hand, appears otherwise have normal range of  motion of the extremities.  Doubt he had true syncopal episode.    AMS:  Suspect related to alcohol intoxication and hypercapnia.  This is already improved and he is back to baseline.  Lower suspicion for CVA is presenting issue.    CKD 3:  Numbers overall appear stable.  Follows with nephrology.    Patient otherwise meets observation level of care and anticipate less than 2 midnight stay.  Further recommendation depend upon clinical course.    Risk Assessment: High  DVT Prophylaxis: Heparin  Code Status: Full  Diet: Renal    Advance Care Planning  ACP discussion was held with the patient during this visit. Patient does not have an advance directive, declines further assistance.           Lisa Dias DO  11/08/23  05:41 EST    Dictated utilizing Dragon dictation.    Electronically signed by Lisa Dias DO at 11/08/23 0556       Prior to Admission Medications       Prescriptions Last Dose Informant Patient Reported? Taking?    amLODIPine (NORVASC) 10 MG tablet 11/7/2023  Yes Yes    Take 1 tablet by mouth Daily.    carvedilol (COREG) 25 MG tablet 11/7/2023  Yes Yes    Take 1 tablet by mouth 2 (Two) Times a Day With Meals.    chlorthalidone (HYGROTON) 25 MG tablet 11/7/2023  Yes Yes    Take 1 tablet by mouth Daily.    escitalopram (LEXAPRO) 20 MG tablet 11/7/2023  Yes Yes    Take 1 tablet by mouth Every Morning.    folic acid (FOLVITE) 1 MG tablet 11/7/2023  Yes Yes    Take 1 tablet by mouth Daily.    gabapentin (NEURONTIN) 600 MG tablet 11/7/2023  Yes Yes    Take 1 tablet by mouth 3 (Three) Times a Day.    hydroxychloroquine (PLAQUENIL) 200 MG tablet 11/7/2023  Yes Yes    Take 1 tablet by mouth Daily.    lisinopril (PRINIVIL,ZESTRIL) 40 MG tablet 11/7/2023  Yes Yes    Take 1 tablet by mouth Daily.    methotrexate 2.5 MG tablet Past Week  Yes Yes    Take 6 tablets by mouth 1 (One) Time Per Week.    torsemide (DEMADEX) 10 MG tablet 11/7/2023  Yes Yes    Take 1 tablet by mouth Daily.    traMADol  (ULTRAM) 50 MG tablet Past Week  Yes Yes    Take 1 tablet by mouth Every 12 (Twelve) Hours As Needed for Moderate Pain.    predniSONE (DELTASONE) 10 MG tablet Unknown  Yes No    Take 1 tablet by mouth Daily. One tablet daily for 2-5 days as needed for flare up    escitalopram (LEXAPRO) 10 MG tablet Not Taking  Yes No    Take 1 tablet by mouth Every Morning.    Patient not taking:  Reported on 2023             Physical Therapy Notes (most recent note)        Giancarlo Payne, PT at 23 1452  Version 1 of 1         Patient Name: Dharmesh Samuel  : 1970    MRN: [id number]                              Today's Date: 2023       Admit Date: 2023    Visit Dx:     ICD-10-CM ICD-9-CM   1. Acute respiratory failure with hypoxia and hypercapnia  J96.01 518.81    J96.02    2. Alcoholic intoxication without complication  F10.920 305.00   3. Injury of head, initial encounter  S09.90XA 959.01     Patient Active Problem List   Diagnosis    Acute respiratory failure with hypoxia and hypercapnia    COPD exacerbation    Acute respiratory failure with hypoxia     Past Medical History:   Diagnosis Date    Impaired mobility      History reviewed. No pertinent surgical history.   General Information       Row Name 23 1440          Physical Therapy Time and Intention    Document Type evaluation  -MS     Mode of Treatment physical therapy  -MS       Row Name 23 1442          General Information    Patient Profile Reviewed yes  -MS     Prior Level of Function independent:;all household mobility;community mobility  -MS     Existing Precautions/Restrictions fall  -MS     Barriers to Rehab medically complex;previous functional deficit;ineffective coping;environmental barriers  -MS       Row Name 23 1440          Living Environment    People in Home child(biju), dependent;spouse  -MS       Row Name 23 1440          Home Main Entrance    Number of Stairs, Main Entrance one  -MS       Row Name  11/08/23 1440          Stairs Within Home, Primary    Number of Stairs, Within Home, Primary none  -MS       Row Name 11/08/23 1440          Cognition    Orientation Status (Cognition) oriented x 4  -MS       Row Name 11/08/23 1440          Safety Issues, Functional Mobility    Safety Issues Affecting Function (Mobility) safety precautions follow-through/compliance;insight into deficits/self-awareness;judgment;safety precaution awareness;impulsivity;sequencing abilities;awareness of need for assistance  -MS     Impairments Affecting Function (Mobility) balance;endurance/activity tolerance;pain;postural/trunk control;strength  -MS               User Key  (r) = Recorded By, (t) = Taken By, (c) = Cosigned By      Initials Name Provider Type    Giancarlo Skaggs PT Physical Therapist                   Mobility       Row Name 11/08/23 1440          Bed Mobility    Bed Mobility supine-sit  -MS     Supine-Sit [place] (Bed Mobility) standby assist  -MS     Assistive Device (Bed Mobility) bed rails;head of bed elevated  -MS       Row Name 11/08/23 1440          Sit-Stand Transfer    Sit-Stand [place] (Transfers) standby assist  -MS     Assistive Device (Sit-Stand Transfers) walker, front-wheeled;bariatric  -MS       Row Name 11/08/23 1440          Gait/Stairs (Locomotion)    [place] Level (Gait) contact guard  -MS     Assistive Device (Gait) walker, front-wheeled;bariatric equipment  -MS     Distance in Feet (Gait) 7  -MS     Deviations/Abnormal Patterns (Gait) festinating/shuffling;gait speed decreased;base of support, wide  -MS               User Key  (r) = Recorded By, (t) = Taken By, (c) = Cosigned By      Initials Name Provider Type    Giancarlo Skaggs PT Physical Therapist                   Obj/Interventions       Row Name 11/08/23 1441          Range of Motion Comprehensive    General Range of Motion bilateral lower extremity ROM WFL  -MS       Row Name 11/08/23 1441          Strength  Comprehensive (MMT)    General Manual Muscle Testing (MMT) Assessment lower extremity strength deficits identified  -MS     Comment, General Manual Muscle Testing (MMT) Assessment L knee arthritis, reports knee buckling, MMT not performed d/t pain  -MS       Row Name 11/08/23 1441          Sensory Assessment (Somatosensory)    Sensory Assessment (Somatosensory) sensation intact  -MS               User Key  (r) = Recorded By, (t) = Taken By, (c) = Cosigned By      Initials Name Provider Type    MS Giancarlo Payne, PT Physical Therapist                   Goals/Plan       Row Name 11/08/23 1448          Bed Mobility Goal 1 (PT)    Activity/Assistive Device (Bed Mobility Goal 1, PT) bed mobility activities, all  -MS     [place] Level/Cues Needed (Bed Mobility Goal 1, PT) modified independence  -MS     Time Frame (Bed Mobility Goal 1, PT) long term goal (LTG);2 weeks  -MS       Row Name 11/08/23 1448          Gait Training Goal 1 (PT)    Activity/Assistive Device (Gait Training Goal 1, PT) gait (walking locomotion);assistive device use  -MS     [place] Level (Gait Training Goal 1, PT) modified independence  -MS     Distance (Gait Training Goal 1, PT) 150ft  -MS     Time Frame (Gait Training Goal 1, PT) long term goal (LTG);2 weeks  -MS       Row Name 11/08/23 1448          Patient Education Goal (PT)    Activity (Patient Education Goal, PT) ther exer x15 reps ea  -MS     [place]/Cues/Accuracy (Memory Goal 2, PT) demonstrates adequately  -MS     Time Frame (Patient Education Goal, PT) long term goal (LTG);2 weeks  -MS       Row Name 11/08/23 1448          Therapy Assessment/Plan (PT)    Planned Therapy Interventions (PT) balance training;bed mobility training;home exercise program;gait training;transfer training;postural re-education;ROM (range of motion);stair training;strengthening;patient/family education  -MS               User Key  (r) = Recorded By, (t) = Taken By, (c) = Cosigned By      Initials  Name Provider Type    MS Giancarlo Payne, PT Physical Therapist                   Clinical Impression       Row Name 11/08/23 1442          Pain    Pretreatment Pain Rating 10/10  -MS     Posttreatment Pain Rating 10/10  -MS     Pain Location generalized  -MS     Pain Intervention(s) Repositioned;Ambulation/increased activity  -MS       Row Name 11/08/23 1442          Plan of Care Review    Plan of Care Reviewed With patient  -MS     Progress no change  -MS     Outcome Evaluation Pt participated in PT eval thisd ate. Pt receieved in supine, initially hesistance to participated in PT, however stated he needed to use the restroom. Pt t/f'd to EOB with SBA. able to maintain balance at OEB. Pt reported chronic pain that limits mobility. Pt required assist with donning socks. Pt thenstood with CGA from EOB. cues for hand placemetn and use of Rwx. In standing, Pt reported dizziness that did not subside with minutes of standing. Pt requesting to snow restroom, attempted 7 ft of gait but had to return to sitting EOB due to dizziness. BP in sitting taken 174/106. RN notified. Pt left sitting at EOB. Pt would benefit from cont skilled PTx during this inpatient stay and HHPT at disch.  -MS       Row Name 11/08/23 1442          Therapy Assessment/Plan (PT)    Patient/Family Therapy Goals Statement (PT) to go home  -MS     Rehab Potential (PT) good, to achieve stated therapy goals  -MS     Criteria for Skilled Interventions Met (PT) yes;meets criteria  -MS     Therapy Frequency (PT) 5 times/wk  -MS       Row Name 11/08/23 1442          Vital Signs    O2 Delivery Pre Treatment room air  -MS     O2 Delivery Intra Treatment room air  -MS     O2 Delivery Post Treatment room air  -MS     Pre Patient Position Supine  -MS     Intra Patient Position Standing  -MS     Post Patient Position Sitting  -MS       Row Name 11/08/23 1442          Positioning and Restraints    Pre-Treatment Position in bed  -MS     Post Treatment Position bed   -MS     In Bed sitting;call light within reach;exit alarm on;encouraged to call for assist;notified nsg  -MS               User Key  (r) = Recorded By, (t) = Taken By, (c) = Cosigned By      Initials Name Provider Type    Giancarlo Skaggs, PT Physical Therapist                   Outcome Measures       Row Name 11/08/23 1449 11/08/23 0800       How much help from another person do you currently need...    Turning from your back to your side while in flat bed without using bedrails? 4  -MS 2  -RP    Moving from lying on back to sitting on the side of a flat bed without bedrails? 4  -MS 2  -RP    Moving to and from a bed to a chair (including a wheelchair)? 3  -MS 2  -RP    Standing up from a chair using your arms (e.g., wheelchair, bedside chair)? 3  -MS 2  -RP    Climbing 3-5 steps with a railing? 3  -MS 2  -RP    To walk in hospital room? 3  -MS 2  -RP    AM-PAC 6 Clicks Score (PT) 20  -MS 12  -RP    Highest level of mobility 6 --> Walked 10 steps or more  -MS 4 --> Transferred to chair/commode  -RP      Row Name 11/08/23 0650          How much help from another person do you currently need...    Turning from your back to your side while in flat bed without using bedrails? 2  -BD     Moving from lying on back to sitting on the side of a flat bed without bedrails? 2  -BD     Moving to and from a bed to a chair (including a wheelchair)? 2  -BD     Standing up from a chair using your arms (e.g., wheelchair, bedside chair)? 2  -BD     Climbing 3-5 steps with a railing? 2  -BD     To walk in hospital room? 2  -BD     AM-PAC 6 Clicks Score (PT) 12  -BD     Highest level of mobility 4 --> Transferred to chair/commode  -BD       Row Name 11/08/23 1351          Functional Assessment    Outcome Measure Options AM-PAC 6 Clicks Daily Activity (OT)  -SD               User Key  (r) = Recorded By, (t) = Taken By, (c) = Cosigned By      Initials Name Provider Type    Marilyn Dela Cruz OT Occupational Therapist    MS Payne  Giancarlo, PT Physical Therapist    Radha Duran, RN Registered Nurse    Fannie Ríos RN Registered Nurse                                 Physical Therapy Education       Title: PT OT SLP Therapies (In Progress)       Topic: Physical Therapy (In Progress)       Point: Mobility training (Done)       Learning Progress Summary             Patient Acceptance, E, VU by MS at 11/8/2023 1450    Comment: importance of mobility                         Point: Home exercise program (Done)       Learning Progress Summary             Patient Acceptance, E, VU by MS at 11/8/2023 1450    Comment: importance of mobility                         Point: Body mechanics (Not Started)       Learner Progress:  Not documented in this visit.              Point: Precautions (Not Started)       Learner Progress:  Not documented in this visit.                              User Key       Initials Effective Dates Name Provider Type Discipline    MS 08/22/23 -  Giancarlo Payne PT Physical Therapist PT                  PT Recommendation and Plan  Planned Therapy Interventions (PT): balance training, bed mobility training, home exercise program, gait training, transfer training, postural re-education, ROM (range of motion), stair training, strengthening, patient/family education  Plan of Care Reviewed With: patient  Progress: no change  Outcome Evaluation: Pt participated in PT eval thisd ate. Pt receieved in supine, initially hesistance to participated in PT, however stated he needed to use the restroom. Pt t/f'd to EOB with SBA. able to maintain balance at OEB. Pt reported chronic pain that limits mobility. Pt required assist with donning socks. Pt thenstood with CGA from EOB. cues for hand placemetn and use of Rwx. In standing, Pt reported dizziness that did not subside with minutes of standing. Pt requesting to snow restroom, attempted 7 ft of gait but had to return to sitting EOB due to dizziness. BP in sitting taken 174/106. RN  notified. Pt left sitting at EOB. Pt would benefit from cont skilled PTx during this inpatient stay and HHPT at disch.     Time Calculation:   PT Evaluation Complexity  History, PT Evaluation Complexity: 1-2 personal factors and/or comorbidities  Examination of Body Systems (PT Eval Complexity): total of 3 or more elements  Clinical Presentation (PT Evaluation Complexity): evolving  Clinical Decision Making (PT Evaluation Complexity): moderate complexity  Overall Complexity (PT Evaluation Complexity): moderate complexity     PT Charges       Row Name 23 1451             Time Calculation    Start Time 1111  -MS      PT Received On 23  -MS      PT Goal Re-Cert Due Date 23  -MS         Untimed Charges    PT Eval/Re-eval Minutes 55  -MS         Total Minutes    Untimed Charges Total Minutes 55  -MS       Total Minutes 55  -MS                User Key  (r) = Recorded By, (t) = Taken By, (c) = Cosigned By      Initials Name Provider Type    MS Giancarlo Payne, PT Physical Therapist                  Therapy Charges for Today       Code Description Service Date Service Provider Modifiers Qty    94722868274 HC PT EVAL MOD COMPLEXITY 4 2023 Giancarlo Payne, PT GP 1            PT G-Codes  Outcome Measure Options: AM-PAC 6 Clicks Daily Activity (OT)  AM-PAC 6 Clicks Score (PT): 20  AM-PAC 6 Clicks Score (OT): 18  PT Discharge Summary  Anticipated Discharge Disposition (PT): home with assist, home with home health    Giancarlo Payne PT  2023      Electronically signed by Giancarlo Payne, PT at 23 1452          Occupational Therapy Notes (most recent note)        Marilyn Keating, OT at 23 1353          Patient Name: Dharmesh Samuel  : 1970    MRN: [id number]                              Today's Date: 2023       Admit Date: 2023    Visit Dx:     ICD-10-CM ICD-9-CM   1. Acute respiratory failure with hypoxia and hypercapnia  J96.01 518.81    J96.02    2. Alcoholic  intoxication without complication  F10.920 305.00   3. Injury of head, initial encounter  S09.90XA 959.01     Patient Active Problem List   Diagnosis    Acute respiratory failure with hypoxia and hypercapnia    COPD exacerbation    Acute respiratory failure with hypoxia     History reviewed. No pertinent past medical history.  History reviewed. No pertinent surgical history.   General Information       Row Name 11/08/23 1334          OT Time and Intention    Document Type evaluation  -SD     Mode of Treatment occupational therapy  -SD       Row Name 11/08/23 1335          General Information    Patient Profile Reviewed yes  -SD     Prior Level of Function independent:;all household mobility;community mobility;ADL's  Lives in an  camper, has a cane and CPAP. States his wife left him yesterday and took his 8 y.o. daughter with her. Patient has L knee arthritis and states his knee gives out on him causing him to fall. 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  -SD     Existing Precautions/Restrictions fall  -SD     Barriers to Rehab medically complex;previous functional deficit;ineffective coping;environmental barriers  -SD       Row Name 11/08/23 1335          Living Environment    People in Home child(biju), dependent;spouse  -SD       Row Name 11/08/23 1335          Home Main Entrance    Number of Stairs, Main Entrance one  -SD       Row Name 11/08/23 1335          Stairs Within Home, Primary    Number of Stairs, Within Home, Primary none  -SD       Row Name 11/08/23 1335          Cognition    Orientation Status (Cognition) oriented x 4  -SD       Row Name 11/08/23 1335          Safety Issues, Functional Mobility    Safety Issues Affecting Function (Mobility) safety precautions follow-through/compliance;safety precaution awareness;insight into deficits/self-awareness;impulsivity;awareness of need for assistance  -SD     Impairments Affecting Function (Mobility) balance;endurance/activity  tolerance;pain;postural/trunk control;strength  -SD               User Key  (r) = Recorded By, (t) = Taken By, (c) = Cosigned By      Initials Name Provider Type    SD Marilyn Keating OT Occupational Therapist                     Mobility/ADL's       Row Name 11/08/23 1339          Bed Mobility    Bed Mobility supine-sit  -SD     Supine-Sit [place] (Bed Mobility) standby assist  -SD     Assistive Device (Bed Mobility) bed rails;head of bed elevated  -SD       Row Name 11/08/23 1339          Transfers    Transfers sit-stand transfer  -SD       Row Name 11/08/23 1339          Sit-Stand Transfer    Sit-Stand [place] (Transfers) standby assist  -SD     Assistive Device (Sit-Stand Transfers) walker, front-wheeled  -SD       Row Name 11/08/23 1339          Functional Mobility    Functional Mobility- Ind. Level contact guard assist  -SD     Functional Mobility- Device walker, front-wheeled  -SD     Functional Mobility-Distance (Feet) 7  -SD     Functional Mobility- Safety Issues balance decreased during turns;sequencing ability decreased;step length decreased  -SD     Functional Mobility- Comment dizzy upon standing, returned to EOB and /106  -SD       Row Name 11/08/23 1339          Activities of Daily Living    BADL Assessment/Intervention bathing;upper body dressing;lower body dressing;grooming;feeding;toileting  -SD       Row Name 11/08/23 1339          Bathing Assessment/Intervention    [place] Level (Bathing) minimum assist (75% patient effort)  -SD       Row Name 11/08/23 1339          Upper Body Dressing Assessment/Training    [place] Level (Upper Body Dressing) standby assist  -SD       Row Name 11/08/23 1339          Lower Body Dressing Assessment/Training    [place] Level (Lower Body Dressing) minimum assist (75% patient effort)  -SD       Row Name 11/08/23 1339          Grooming Assessment/Training    [place] Level (Grooming) standby assist  -SD       Row Name 11/08/23 1339           Self-Feeding Assessment/Training    [place] Level (Feeding) supervision  -SD       Row Name 11/08/23 1339          Toileting Assessment/Training    [place] Level (Toileting) contact guard assist  -SD     Assistive Devices (Toileting) urinal  -SD               User Key  (r) = Recorded By, (t) = Taken By, (c) = Cosigned By      Initials Name Provider Type    SD Marilyn Keating OT Occupational Therapist                   Obj/Interventions       Row Name 11/08/23 1341          Range of Motion Comprehensive    General Range of Motion bilateral upper extremity ROM WFL  -SD       Row Name 11/08/23 1341          Strength Comprehensive (MMT)    General Manual Muscle Testing (MMT) Assessment no strength deficits identified  -SD     Comment, General Manual Muscle Testing (MMT) Assessment L knee arthritis, has chronic low back and knee pain  -SD               User Key  (r) = Recorded By, (t) = Taken By, (c) = Cosigned By      Initials Name Provider Type    SD Marilyn Keating OT Occupational Therapist                   Goals/Plan       Row Name 11/08/23 1349          Transfer Goal 1 (OT)    Activity/Assistive Device (Transfer Goal 1, OT) sit-to-stand/stand-to-sit;walker, rolling  -SD     [place] Level/Cues Needed (Transfer Goal 1, OT) modified independence  -SD     Time Frame (Transfer Goal 1, OT) long term goal (LTG)  -SD     Progress/Outcome (Transfer Goal 1, OT) goal ongoing  -SD       Row Name 11/08/23 1349          Dressing Goal 1 (OT)    Activity/Device (Dressing Goal 1, OT) lower body dressing;reacher;sock-aid  -SD     [place]/Cues Needed (Dressing Goal 1, OT) standby assist  -SD     Time Frame (Dressing Goal 1, OT) 2 weeks  -SD     Progress/Outcome (Dressing Goal 1, OT) goal ongoing  -SD       Row Name 11/08/23 1349          Toileting Goal 1 (OT)    Activity/Device (Toileting Goal 1, OT) toileting skills, all;commode  -SD     [place] Level/Cues Needed (Toileting Goal 1, OT) standby  assist  -SD     Time Frame (Toileting Goal 1, OT) 2 weeks  -SD     Progress/Outcome (Toileting Goal 1, OT) goal ongoing  -SD       Row Name 11/08/23 1349          Strength Goal 1 (OT)    Strength Goal 1 (OT) Patient to perform UB ther ex as tolerated  -SD     Time Frame (Strength Goal 1, OT) long term goal (LTG)  -SD     Progress/Outcome (Strength Goal 1, OT) goal ongoing  -SD       Row Name 11/08/23 1349          Therapy Assessment/Plan (OT)    Planned Therapy Interventions (OT) activity tolerance training;adaptive equipment training;BADL retraining;patient/caregiver education/training;ROM/therapeutic exercise;strengthening exercise;transfer/mobility retraining  -SD               User Key  (r) = Recorded By, (t) = Taken By, (c) = Cosigned By      Initials Name Provider Type    Marilyn Dela Cruz OT Occupational Therapist                   Clinical Impression       Row Name 11/08/23 1342          Pain Assessment    Pretreatment Pain Rating 10/10  -SD     Posttreatment Pain Rating 10/10  -SD     Pain Location generalized  -SD     Pain Intervention(s) Repositioned;Ambulation/increased activity  -SD       Row Name 11/08/23 1342          Plan of Care Review    Plan of Care Reviewed With patient  -SD     Progress no change  -SD     Outcome Evaluation OT eval completed. Patient supine in bed, Ox4, c/o 10/10 pain. Patient reports he lives in a camper with his wife and 8 y.o. daughter, he has a cane. 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 Patient has chronic pain and reports decreased mobility at home d/t back and knee pain. Patient moved to EOB with SBA, required min A to [place] socks d/t body habitus and low back pain. Patient performed sit to stand with SBA and c/o dizziness in standing. Patient insistent on walking to bathroom to urinate standing at commode. Dizziness worsened and patient returned to EOB, BP noted to be 174/106. Notified RN. Patient left sitting EOB, RN aware.  Patient to continue with OT services prior to DC home. Patient would benefit from a RW and HH services.  -SD       Row Name 11/08/23 1342          Therapy Assessment/Plan (OT)    Patient/Family Therapy Goal Statement (OT) home  -SD     Rehab Potential (OT) fair, will monitor progress closely  -SD     Criteria for Skilled Therapeutic Interventions Met (OT) skilled treatment is necessary  -SD     Therapy Frequency (OT) 3 times/wk  5 times if indicated  -SD       Row Name 11/08/23 1342          Therapy Plan Review/Discharge Plan (OT)    Equipment Needs Upon Discharge (OT) walker, rolling  -SD     Anticipated Discharge Disposition (OT) home with home health;home with assist  -SD       Row Name 11/08/23 1342          Vital Signs    Pre Systolic BP Rehab 174  -SD     Pre Treatment Diastolic   -SD     O2 Delivery Pre Treatment room air  -SD     O2 Delivery Intra Treatment room air  -SD     O2 Delivery Post Treatment room air  -SD       Row Name 11/08/23 1342          Positioning and Restraints    Pre-Treatment Position in bed  -SD     Post Treatment Position bed  -SD     In Bed sitting EOB;encouraged to call for assist;call light within reach;notified nsg  -SD               User Key  (r) = Recorded By, (t) = Taken By, (c) = Cosigned By      Initials Name Provider Type    Marilyn Dela Cruz OT Occupational Therapist                   Outcome Measures       Row Name 11/08/23 1351          How much help from another is currently needed...    Putting on and taking off regular lower body clothing? 3  -SD     Bathing (including washing, rinsing, and drying) 3  -SD     Toileting (which includes using toilet bed pan or urinal) 3  -SD     Putting on and taking off regular upper body clothing 3  -SD     Taking care of personal grooming (such as brushing teeth) 3  -SD     Eating meals 3  -SD     AM-PAC 6 Clicks Score (OT) 18  -SD       Row Name 11/08/23 0800 11/08/23 0650       How much help from another person do you  currently need...    Turning from your back to your side while in flat bed without using bedrails? 2  -RP 2  -BD    Moving from lying on back to sitting on the side of a flat bed without bedrails? 2  -RP 2  -BD    Moving to and from a bed to a chair (including a wheelchair)? 2  -RP 2  -BD    Standing up from a chair using your arms (e.g., wheelchair, bedside chair)? 2  -RP 2  -BD    Climbing 3-5 steps with a railing? 2  -RP 2  -BD    To walk in hospital room? 2  -RP 2  -BD    AM-PAC 6 Clicks Score (PT) 12  -RP 12  -BD    Highest level of mobility 4 --> Transferred to chair/commode  -RP 4 --> Transferred to chair/commode  -BD      Row Name 11/08/23 1351          Functional Assessment    Outcome Measure Options AM-PAC 6 Clicks Daily Activity (OT)  -SD               User Key  (r) = Recorded By, (t) = Taken By, (c) = Cosigned By      Initials Name Provider Type    Marilyn Dela Cruz OT Occupational Therapist    Radha Duran, RN Registered Nurse    Fannie Ríos, RN Registered Nurse                    Occupational Therapy Education       Title: PT OT SLP Therapies (In Progress)       Topic: Occupational Therapy (In Progress)       Point: ADL training (Done)       Description:   Instruct learner(s) on proper safety adaptation and remediation techniques during self care or transfers.   Instruct in proper use of assistive devices.                  Learning Progress Summary             Patient Acceptance, E,TB, VU by SD at 11/8/2023 1351    Comment: OT POC                         Point: Home exercise program (Not Started)       Description:   Instruct learner(s) on appropriate technique for monitoring, assisting and/or progressing therapeutic exercises/activities.                  Learner Progress:  Not documented in this visit.              Point: Precautions (Not Started)       Description:   Instruct learner(s) on prescribed precautions during self-care and functional transfers.                  Learner  Progress:  Not documented in this visit.              Point: Body mechanics (Not Started)       Description:   Instruct learner(s) on proper positioning and spine alignment during self-care, functional mobility activities and/or exercises.                  Learner Progress:  Not documented in this visit.                              User Key       Initials Effective Dates Name Provider Type Discipline    SD 06/16/21 -  Marilyn Keating OT Occupational Therapist OT                  OT Recommendation and Plan  Planned Therapy Interventions (OT): activity tolerance training, adaptive equipment training, BADL retraining, patient/caregiver education/training, ROM/therapeutic exercise, strengthening exercise, transfer/mobility retraining  Therapy Frequency (OT): 3 times/wk (5 times if indicated)  Plan of Care Review  Plan of Care Reviewed With: patient  Progress: no change  Outcome Evaluation: OT eval completed. Patient supine in bed, Ox4, c/o 10/10 pain. Patient reports he lives in a camper with his wife and 8 y.o. daughter, he has a cane. 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 Patient has chronic pain and reports decreased mobility at home d/t back and knee pain. Patient moved to EOB with SBA, required min A to [place] socks d/t body habitus and low back pain. Patient performed sit to stand with SBA and c/o dizziness in standing. Patient insistent on walking to bathroom to urinate standing at commode. Dizziness worsened and patient returned to EOB, BP noted to be 174/106. Notified RN. Patient left sitting EOB, RN aware. Patient to continue with OT services prior to DC home. Patient would benefit from a RW and  services.     Time Calculation:   Evaluation Complexity (OT)  Review Occupational Profile/Medical/Therapy History Complexity: expanded/moderate complexity  Assessment, Occupational Performance/Identification of Deficit Complexity: 3-5 performance deficits  Clinical  Decision Making Complexity (OT): detailed assessment/moderate complexity  Overall Complexity of Evaluation (OT): moderate complexity     Time Calculation- OT       Row Name 11/08/23 1352             Time Calculation- OT    OT Start Time 1112  -SD      OT Received On 11/08/23  -SD      OT Goal Re-Cert Due Date 11/20/23  -SD         Untimed Charges    OT Eval/Re-eval Minutes 45  -SD         Total Minutes    Untimed Charges Total Minutes 45  -SD       Total Minutes 45  -SD                User Key  (r) = Recorded By, (t) = Taken By, (c) = Cosigned By      Initials Name Provider Type    Marilyn Dela Cruz OT Occupational Therapist                  Therapy Charges for Today       Code Description Service Date Service Provider Modifiers Qty    36342144008 HC OT EVAL MOD COMPLEXITY 3 11/8/2023 Marilyn Keating OT GO 1                 Marilyn Keating OT  11/8/2023    Electronically signed by Marilyn Keating OT at 11/08/23 8956

## 2023-11-14 NOTE — CASE MANAGEMENT/SOCIAL WORK
Follow up HH call made; pt was out of service area. [facility] in [place] accepted pt; did call and apologize and confirm pt still wanted HH services.

## 2023-11-14 NOTE — CASE MANAGEMENT/SOCIAL WORK
Case Management Discharge Note           Provided Post Acute Provider List?: N/A  Provided Post Acute Provider Quality & Resource List?: N/A    Selected Continued Care - Discharged on 11/9/2023 Admission date: 11/7/2023 - Discharge disposition: Home or Self Care      Destination    No services have been selected for the patient.                Durable Medical Equipment Coordination complete.      Service Provider Selected Services Address Phone Fax Patient Preferred    [facility] [address]  [facility] [address] [address] [phone] --       Internal Comment last updated by Mark Tubbs RN 11/9/2023 1307    Walker delivered                         Dialysis/Infusion    No services have been selected for the patient.                Home Medical Care Coordination complete.      Service Provider Selected Services Address Phone Fax Patient Preferred    [facility] [address] [address] [phone] --              Therapy    No services have been selected for the patient.                Community Resources    No services have been selected for the patient.                Community & DME    No services have been selected for the patient.                    Transportation Services  Private: Car    Final Discharge Disposition Code: 01 - home or self-care

## 2023-11-15 ENCOUNTER — OFFICE VISIT (OUTPATIENT)
Dept: FAMILY MEDICINE CLINIC | Facility: CLINIC | Age: 53
End: 2023-11-15
Payer: COMMERCIAL

## 2023-11-15 VITALS
WEIGHT: 293 LBS | BODY MASS INDEX: 39.68 KG/M2 | HEART RATE: 65 BPM | DIASTOLIC BLOOD PRESSURE: 120 MMHG | HEIGHT: 72 IN | SYSTOLIC BLOOD PRESSURE: 170 MMHG | OXYGEN SATURATION: 98 %

## 2023-11-15 DIAGNOSIS — J41.8 MIXED SIMPLE AND MUCOPURULENT CHRONIC BRONCHITIS: ICD-10-CM

## 2023-11-15 DIAGNOSIS — N18.2 CKD (CHRONIC KIDNEY DISEASE) STAGE 2, GFR 60-89 ML/MIN: Primary | ICD-10-CM

## 2023-11-15 DIAGNOSIS — F33.0 MILD EPISODE OF RECURRENT MAJOR DEPRESSIVE DISORDER: ICD-10-CM

## 2023-11-15 DIAGNOSIS — I10 ESSENTIAL HYPERTENSION: ICD-10-CM

## 2023-11-15 DIAGNOSIS — M54.42 CHRONIC BILATERAL LOW BACK PAIN WITH LEFT-SIDED SCIATICA: ICD-10-CM

## 2023-11-15 DIAGNOSIS — E66.01 CLASS 3 SEVERE OBESITY DUE TO EXCESS CALORIES WITH SERIOUS COMORBIDITY AND BODY MASS INDEX (BMI) OF 40.0 TO 44.9 IN ADULT: ICD-10-CM

## 2023-11-15 DIAGNOSIS — K21.9 GASTROESOPHAGEAL REFLUX DISEASE WITHOUT ESOPHAGITIS: ICD-10-CM

## 2023-11-15 DIAGNOSIS — M32.9 LUPUS: ICD-10-CM

## 2023-11-15 DIAGNOSIS — G89.29 CHRONIC BILATERAL LOW BACK PAIN WITH LEFT-SIDED SCIATICA: ICD-10-CM

## 2023-11-15 RX ORDER — TRAMADOL HYDROCHLORIDE 50 MG/1
50 TABLET ORAL EVERY 12 HOURS PRN
Qty: 60 TABLET | Refills: 2 | Status: SHIPPED | OUTPATIENT
Start: 2023-11-15

## 2023-11-15 RX ORDER — PANTOPRAZOLE SODIUM 40 MG/1
40 TABLET, DELAYED RELEASE ORAL DAILY
Qty: 30 TABLET | Refills: 2 | Status: SHIPPED | OUTPATIENT
Start: 2023-11-15

## 2023-11-15 RX ORDER — LISINOPRIL 40 MG/1
40 TABLET ORAL DAILY
Qty: 90 TABLET | Refills: 1 | Status: SHIPPED | OUTPATIENT
Start: 2023-11-15

## 2023-11-15 RX ORDER — UMECLIDINIUM BROMIDE AND VILANTEROL TRIFENATATE 62.5; 25 UG/1; UG/1
1 POWDER RESPIRATORY (INHALATION)
Qty: 60 EACH | Refills: 5 | Status: SHIPPED | OUTPATIENT
Start: 2023-11-15

## 2023-11-15 RX ORDER — AMLODIPINE BESYLATE 10 MG/1
10 TABLET ORAL DAILY
Qty: 90 TABLET | Refills: 1 | Status: SHIPPED | OUTPATIENT
Start: 2023-11-15

## 2023-11-15 NOTE — PROGRESS NOTES
Transitional Care Follow Up Visit  Subjective     Dharmesh Samuel is a 53 y.o. male who presents for a transitional care management visit.    Within 48 business hours after discharge our office contacted him via telephone to coordinate his care and needs.      I reviewed and discussed the details of that call along with the discharge summary, hospital problems, inpatient lab results, inpatient diagnostic studies, and consultation reports with Dharmesh.     Current outpatient and discharge medications have been reconciled for the patient.  Reviewed by: AMY Escamilla          11/9/2023     3:52 PM   Date of TCM Phone Call   [facility]   Date of Admission 11/8/2023   Date of Discharge 11/9/2023   Discharge Disposition Home or Self Care     Risk for Readmission (LACE) Score: 7 (11/9/2023  6:00 AM)      History of Present Illness   Notes worsening stress since being home from hospital and over the past few months, notes issues with his wife, getting a divorce. Has cut back on etoh use, has been out of his BP meds for a few days. Notes continue sputum production and cough, is trying to cut back on cigarettes      Course During Hospital Stay  The patient is a 52 year old female with a past medical history of MADELAINE with Cpap at home, tobacco use, chronic pain, chronic kidney disease stage III, hypertension, obesity, alcohol use, rheumatoid arthritis and lupus, who presented from home via EMS 11/7/2023 with multiple complaints. EMS had been called out after the patient apparently had had a fall and hit his head on a railing at home.  Appeared intoxicated upon arrival and was brought to the emergency room.  He was also noted to be hypoxic as well.  When asked about alcohol use, he notes he does drink quite frequently at nighttime.  He also admitted to having sleep apnea and supposed to use a CPAP/BiPAP but does not always.       Work up in the emergency department noted vital signs stable.  He was initially on  nonrebreather mask and placed on BiPAP.  Labs with white count 9 hemoglobin 13 platelets 271.  proBNP of 100.  Ethanol of 0.236.  Procalcitonin 0.09.  Creatinine was 1.4.  Urinalysis unremarkable.  UDS positive buprenorphine.  Initial troponin did elevate on repeat test, however decreased on a third troponin.  ABG notable for pH 7.210 PCO2 of 62.  CT of the cervical spine was unremarkable.  CT scan of the head without contrast was unremarkable.  Chest x-ray appeared to show some atelectasis otherwise no acute abnormality.  The patient was monitored in the emergency room for multiple hours, so oxygen had been weaned off a couple times, however he continued to show hypoxia and will drop into the mid upper 80s.      Admitted to the hospital and started on bronchodilators and steroids, continued on bipap therapy.  Patient advised that he probably uses his Cpap machine 1-2x/ week on average and feels like it pumps too much air and he ends up taking it off at some point.  Patient admits to 1PPD smoker for 30 years and heavy alcohol use nightly for quite some time.  Admits to problems with wife after being admitted that caused him to consume more than his usual prior to admission.  He had mildly elevated troponins likely type II in setting of hypercapnia and hypoxia.  Echo obtained and noted left systolic function hyperdynamic with EF-77% and diastolic function normal.  Mental status remained stable.  He was started on alcohol withdrawal protocol and given PRN Ativan.  His respiratory failure was likely multifactorial with uncontrolled sleep apnea, underlying COPD, alcohol abuse and intoxication, and chronic tobacco abuse.  Oxygen was weaned back to room air without difficulty.  Repeat ABG stable.     Patient stable for discharge home today.  Will give one more day of Prednisone for COPD exacerbation and refill Albuterol rescue inhaler.  Ambulatory referral to COPD clinic, patient would benefit from PFTs and maintenance  inhalers if needed.  Case management to assist in Cpap query per patient request to check settings.  Discussed in depth that he needs to wear his Cpap every night.  Discussed in depth alcohol and tobacco cessation--nicotine patches prescribed.  Patient declined speaking to behavioral health at discharge but was agreeable to contact information for after discharge.  Follow up with COPD clinic and PCP.  Return to the hospital with any increased shortness of breath, chest pain, or palpitations.     The following portions of the patient's history were reviewed and updated as appropriate: allergies, current medications, past family history, past medical history, past social history, past surgical history, and problem list.    Review of Systems  Gen- No fevers, chills  CV- No chest pain, palpitations  Resp- No cough, dyspnea  GI- No N/V/D, abd pain  Neuro-No dizziness, headaches    Objective   Physical Exam  Vitals and nursing note reviewed.   Constitutional:       Appearance: He is well-developed. He is obese.   HENT:      Head: Normocephalic and atraumatic.   Eyes:      Pupils: Pupils are equal, round, and reactive to light.   Cardiovascular:      Rate and Rhythm: Normal rate and regular rhythm.      Heart sounds: Normal heart sounds.   Pulmonary:      Effort: Pulmonary effort is normal.      Breath sounds: Normal breath sounds.   Abdominal:      General: Bowel sounds are normal. There is no distension.      Palpations: Abdomen is soft.      Tenderness: There is no abdominal tenderness.   Musculoskeletal:      Cervical back: Neck supple.      Right lower leg: Edema present.      Left lower leg: Edema present.   Skin:     General: Skin is warm and dry.   Neurological:      General: No focal deficit present.      Mental Status: He is alert and oriented to person, place, and time.   Psychiatric:         Mood and Affect: Mood normal.         Behavior: Behavior normal.         Assessment & Plan   Diagnoses and all orders for  this visit:    1. CKD (chronic kidney disease) stage 2, GFR 60-89 ml/min (Primary)    2. Essential hypertension  -     lisinopril (PRINIVIL,ZESTRIL) 40 MG tablet; Take 1 tablet by mouth Daily.  Dispense: 90 tablet; Refill: 1  -     amLODIPine (NORVASC) 10 MG tablet; Take 1 tablet by mouth Daily.  Dispense: 90 tablet; Refill: 1    3. Lupus    4. Mild episode of recurrent major depressive disorder    5. Mixed simple and mucopurulent chronic bronchitis  -     Umeclidinium-Vilanterol (Anoro Ellipta) 62.5-25 MCG/ACT aerosol powder  inhaler; Inhale 1 puff Daily.  Dispense: 60 each; Refill: 5    6. Chronic bilateral low back pain with left-sided sciatica  -     traMADol (ULTRAM) 50 MG tablet; Take 1 tablet by mouth Every 12 (Twelve) Hours As Needed for Moderate Pain.  Dispense: 60 tablet; Refill: 2    7. Class 3 severe obesity due to excess calories with serious comorbidity and body mass index (BMI) of 40.0 to 44.9 in adult    8. Gastroesophageal reflux disease without esophagitis  -     pantoprazole (Protonix) 40 MG EC tablet; Take 1 tablet by mouth Daily.  Dispense: 30 tablet; Refill: 2    -- continue current meds  -- hospital stay reviewed  -- declines counseling  -- start anoro, use albuterol prn

## 2023-11-20 ENCOUNTER — READMISSION MANAGEMENT (OUTPATIENT)
Dept: CALL CENTER | Facility: HOSPITAL | Age: 53
End: 2023-11-20
Payer: COMMERCIAL

## 2023-11-20 NOTE — OUTREACH NOTE
Medical Week 2 Survey      Flowsheet Row Responses   [facility] patient discharged from? [facility]   Does the patient have one of the following disease processes/diagnoses(primary or secondary)? Other   Week 2 attempt successful? No   Unsuccessful attempts Attempt 1            WASHINGTON Lopez Registered Nurse

## 2023-11-21 ENCOUNTER — READMISSION MANAGEMENT (OUTPATIENT)
Dept: CALL CENTER | Facility: HOSPITAL | Age: 53
End: 2023-11-21
Payer: COMMERCIAL

## 2023-11-21 NOTE — OUTREACH NOTE
Medical Week 2 Survey      Flowsheet Row Responses   [facility] patient discharged from? [facility]   Does the patient have one of the following disease processes/diagnoses(primary or secondary)? Other   Week 2 attempt successful? Yes   Call start time 1750   Discharge diagnosis Acute resp failure   Call end time 1759   Meds reviewed with patient/caregiver? Yes   Is the patient having any side effects they believe may be caused by any medication additions or changes? No   Does the patient have all medications ordered at discharge? Yes   Is the patient taking all medications as directed (includes completed medication regime)? Yes   Does the patient have a primary care provider?  Yes   Does the patient have an appointment with their PCP within 7 days of discharge? Yes   Has the patient kept scheduled appointments due by today? Yes   What is the Home health agency?  [facility]   Has home health visited the patient within 72 hours of discharge? Yes   Psychosocial issues? Yes   Psychosocial comments states having feelings of anxiety in response to wife filing for divorce and does not see children   Did the patient receive a copy of their discharge instructions? Yes   Nursing interventions Reviewed instructions with patient   What is the patient's perception of their health status since discharge? Same   Is the patient/caregiver able to teach back signs and symptoms related to disease process for when to call PCP? Yes   Is the patient/caregiver able to teach back signs and symptoms related to disease process for when to call 911? Yes   Is the patient/caregiver able to teach back the hierarchy of who to call/visit for symptoms/problems? PCP, Specialist, Home health nurse, Urgent Care, ED, 911 Yes   Additional teach back comments states feels same respiratory condition as baseline   Week 2 Call Completed? Yes   Call end time 1759            Annie SAUCEDO - Registered Nurse

## 2023-11-28 ENCOUNTER — DISEASE STATE MANAGEMENT VISIT (OUTPATIENT)
Dept: PHARMACY | Facility: HOSPITAL | Age: 53
End: 2023-11-28
Payer: COMMERCIAL

## 2023-11-28 VITALS
WEIGHT: 291 LBS | BODY MASS INDEX: 39.42 KG/M2 | DIASTOLIC BLOOD PRESSURE: 74 MMHG | HEART RATE: 88 BPM | OXYGEN SATURATION: 95 % | SYSTOLIC BLOOD PRESSURE: 121 MMHG | HEIGHT: 72 IN

## 2023-11-28 DIAGNOSIS — G47.33 OSA (OBSTRUCTIVE SLEEP APNEA): ICD-10-CM

## 2023-11-28 DIAGNOSIS — F17.210 CIGARETTE SMOKER: ICD-10-CM

## 2023-11-28 DIAGNOSIS — J96.12 CHRONIC RESPIRATORY FAILURE WITH HYPERCAPNIA: ICD-10-CM

## 2023-11-28 DIAGNOSIS — J44.9 CHRONIC OBSTRUCTIVE PULMONARY DISEASE, UNSPECIFIED COPD TYPE: ICD-10-CM

## 2023-11-28 DIAGNOSIS — R06.02 SHORTNESS OF BREATH: Primary | ICD-10-CM

## 2023-11-28 PROCEDURE — 94618 PULMONARY STRESS TESTING: CPT

## 2023-11-28 RX ORDER — BUDESONIDE AND FORMOTEROL FUMARATE DIHYDRATE 160; 4.5 UG/1; UG/1
2 AEROSOL RESPIRATORY (INHALATION)
COMMUNITY

## 2023-11-28 RX ORDER — ESCITALOPRAM OXALATE 20 MG/1
20 TABLET ORAL
COMMUNITY

## 2023-11-28 RX ORDER — ALBUTEROL SULFATE 90 UG/1
2 AEROSOL, METERED RESPIRATORY (INHALATION) EVERY 4 HOURS PRN
Qty: 8 G | Refills: 0 | Status: SHIPPED | OUTPATIENT
Start: 2023-11-28

## 2023-11-28 NOTE — PROGRESS NOTES
"     New Pulmonary Patient Office Visit      Patient Name: Dharmesh Samuel    Referring Physician: Maylin Pham APRN    Chief Complaint:  Hospital follow up      History of Present Illness: Dharmesh Samuel is a 53 y.o. male who is here today to establish care with Pulmonary following recent hospitalization at [facility] for management of acute respiratory failure, suspected COPD exacerbation and alcohol intoxication.  During admission, the patient was placed on BiPAP, bronchodilators and steroids with improvement.  Patient was able to be discharged home on inhalers.  He is currently using Symbicort and albuterol.  He did have a recent follow-up with his PCP and an oral was prescribed, though he has not yet picked it up from his pharmacy.     Patient reports a known history of \"extreme\" obstructive sleep apnea and has PAP via [employer]. He reports that he does not routinely use his machine, however, and thinks that something about the humidification on his machine may be malfunctioning. He denies needing any new supplies.    Patient does have known lupus and rheumatoid arthritis, for which she follows with rheumatology, Dr. Edwards.    Duration: Dyspnea 3-4 years  Timing: Daily  Context: Walking ~65 feet  Associated Symptoms: AM cough which is productive, wheezing, no fevers, no hemoptysis, no unintended weight loss  Supplemental Oxygen: No  Ever had Blood Clots: No  Cardiac History: Sees Dr. Mcgregor  Smoking history: Down to 1/2 ppd from 1 ppd    Subjective      Review of Systems:   Review of Systems   Constitutional:  Negative for fever and unexpected weight change.   Respiratory:  Positive for cough and shortness of breath. Negative for wheezing.    Cardiovascular:  Negative for chest pain and leg swelling.   Musculoskeletal:  Positive for arthralgias.        Past Medical History:   Past Medical History:   Diagnosis Date    Arthritis     Bulging lumbar disc     Depression 2017    Hypertension     Impaired " mobility     Low back pain     Obesity 2020    Pinched nerve     Sleep apnea     Visual impairment 2020       Past Surgical History: History reviewed. No pertinent surgical history.    Family History:   Family History   Problem Relation Age of Onset    Hypertension Father     Sleep apnea Paternal Uncle     Alcohol abuse Maternal Grandfather     Arthritis Maternal Grandmother     Arthritis Paternal Grandmother        Social History:   Social History     Socioeconomic History    Marital status:    Tobacco Use    Smoking status: Every Day     Packs/day: 1.00     Years: 15.00     Additional pack years: 0.00     Total pack years: 15.00     Types: Cigarettes     Passive exposure: Current    Smokeless tobacco: Former     Types: Snuff   Vaping Use    Vaping Use: Never used   Substance and Sexual Activity    Alcohol use: Yes     Comment: 4 drinks a day    Drug use: Not Currently     Types: Marijuana    Sexual activity: Yes     Partners: Female     Birth control/protection: None       Medications:     Current Outpatient Medications:     albuterol sulfate  (90 Base) MCG/ACT inhaler, Inhale 2 puffs Every 4 (Four) Hours As Needed for Wheezing or Shortness of Air (cough)., Disp: 8 g, Rfl: 0    amLODIPine (NORVASC) 10 MG tablet, Take 1 tablet by mouth Daily., Disp: 90 tablet, Rfl: 1    budesonide-formoterol (SYMBICORT) 160-4.5 MCG/ACT inhaler, Inhale 2 puffs., Disp: , Rfl:     carvedilol (COREG) 25 MG tablet, Take 1 tablet by mouth 2 (Two) Times a Day With Meals., Disp: , Rfl:     chlorthalidone (HYGROTON) 25 MG tablet, Take 1 tablet by mouth Daily., Disp: , Rfl:     Enbrel Mini 50 MG/ML solution cartridge, Not had medication in 3 monts, Disp: , Rfl:     escitalopram (LEXAPRO) 20 MG tablet, Take 1 tablet by mouth Every Morning., Disp: 90 tablet, Rfl: 1    folic acid (FOLVITE) 1 MG tablet, Take 1 tablet by mouth Daily., Disp: 90 tablet, Rfl: 3    gabapentin (NEURONTIN) 600 MG tablet, TAKE 1 TABLET BY MOUTH THREE TIMES  "DAILY, Disp: 90 tablet, Rfl: 1    lisinopril (PRINIVIL,ZESTRIL) 40 MG tablet, Take 1 tablet by mouth Daily., Disp: 90 tablet, Rfl: 1    methotrexate 2.5 MG tablet, Take 6 tablets by mouth 1 (One) Time Per Week., Disp: , Rfl:     nicotine (NICODERM CQ) 21 MG/24HR patch, Place 1 patch on the skin as directed by provider Daily, Disp: 28 patch, Rfl: 0    pantoprazole (Protonix) 40 MG EC tablet, Take 1 tablet by mouth Daily., Disp: 30 tablet, Rfl: 2    predniSONE (DELTASONE) 10 MG tablet, TAKE 1 TABLET BY MOUTH ONCE DAILY AS NEEDED FOR 2-5 DAYS FOR A FLARE UP. MAY REPEAT ONCE WEEKLY., Disp: , Rfl:     torsemide (DEMADEX) 10 MG tablet, Take 1 tablet by mouth once daily, Disp: 30 tablet, Rfl: 0    traMADol (ULTRAM) 50 MG tablet, Take 1 tablet by mouth Every 12 (Twelve) Hours As Needed for Moderate Pain., Disp: 60 tablet, Rfl: 2    Umeclidinium-Vilanterol (Anoro Ellipta) 62.5-25 MCG/ACT aerosol powder  inhaler, Inhale 1 puff Daily., Disp: 60 each, Rfl: 5    cycloSPORINE (RESTASIS) 0.05 % ophthalmic emulsion, Administer 1 drop to both eyes 2 (Two) Times a Day. (Patient not taking: Reported on 11/28/2023), Disp: 10 each, Rfl: 5    escitalopram (LEXAPRO) 20 MG tablet, Take 1 tablet by mouth. (Patient not taking: Reported on 11/28/2023), Disp: , Rfl:     Allergies:   No Known Allergies    Objective     Physical Exam:  Vital Signs:   Vitals:    11/28/23 1312   BP: 121/74   Pulse: 88   SpO2: 95%   Weight: 132 kg (291 lb)  Comment: per patient   Height: 182.9 cm (72\")   ============================  ============================    6 MINUTE WALK TEST    Dharmesh Jimmie   1970             BASELINE   SpO2%: 97 % RA    Heart Rate 98   Blood Pressure 121/74     EXERCISE SpO2% HEART RATE RA or O2 @ LPM   1 MINUTE 95 101 RA   2 MINUTES 95 106 RA   3 MINUTES 95 106 RA   4 MINUTES 95 107 RA   5 MINUTES 95 112 RA   6 MINUTES 94 107 RA   (Number of laps: 6 X 36 meters + Final partial lap: 0 meters = 216 meters)            Distance Walked:  "  Meters 216   SpO2% Post Exercise:  98 %   HR Post Exercise:  80     Reason to stop (if applicable):   ____ Chest Pain   ____ Light Headedness   ____ Dyspnea Unrelieved by Rest   ____ Abnormal Gait Pattern   ____ Severe Fatigue   ____ Other (Specify: __________________________)    Tech Comments (if any): n/a     Test performed by: BB    ============================  ============================   Body mass index is 39.47 kg/m².    Physical Exam  Vitals reviewed.   Constitutional:       General: He is not in acute distress.     Appearance: He is obese. He is not toxic-appearing.   HENT:      Head: Normocephalic and atraumatic.      Mouth/Throat:      Mouth: Mucous membranes are moist.   Eyes:      Conjunctiva/sclera: Conjunctivae normal.   Cardiovascular:      Rate and Rhythm: Normal rate.      Heart sounds: Normal heart sounds.   Pulmonary:      Effort: Pulmonary effort is normal.      Breath sounds: Normal breath sounds.   Abdominal:      General: There is no distension.      Palpations: Abdomen is soft.   Musculoskeletal:         General: No swelling.      Cervical back: Neck supple.   Skin:     General: Skin is warm and dry.      Findings: No rash.   Neurological:      General: No focal deficit present.      Mental Status: He is alert and oriented to person, place, and time.   Psychiatric:         Mood and Affect: Mood normal.         Behavior: Behavior normal.       Results Review:   Site   Date Value Ref Range Status   11/09/2023 Left Radial  Final     Arjun's Test   Date Value Ref Range Status   11/09/2023 Positive  Final     pH, Arterial   Date Value Ref Range Status   11/09/2023 7.358 7.350 - 7.450 pH units Final     pCO2, Arterial   Date Value Ref Range Status   11/09/2023 45.7 (H) 35.0 - 45.0 mm Hg Final     Comment:     83 Value above reference range     pO2, Arterial   Date Value Ref Range Status   11/09/2023 66.8 (L) 75.0 - 100.0 mm Hg Final     Comment:     84 Value below reference range     HCO3,  Arterial   Date Value Ref Range Status   11/09/2023 25.7 22.0 - 28.0 mmol/L Final     Base Excess, Arterial   Date Value Ref Range Status   11/09/2023 -0.3 (L) 0.0 - 2.0 mmol/L Final     Comment:     84 Value below reference range     O2 Saturation, Arterial   Date Value Ref Range Status   11/09/2023 94.0 94.0 - 100.0 % Final     Hematocrit, Blood Gas   Date Value Ref Range Status   11/09/2023 47.5 % Final     Oxyhemoglobin   Date Value Ref Range Status   11/09/2023 92.3 (L) 94 - 99 % Final     Comment:     84 Value below reference range     Methemoglobin   Date Value Ref Range Status   11/09/2023 0.20 0.00 - 1.50 % Final     Carboxyhemoglobin   Date Value Ref Range Status   11/09/2023 1.6 0 - 2 % Final     Barometric Pressure for Blood Gas   Date Value Ref Range Status   11/09/2023 731 mmHg Final     Modality   Date Value Ref Range Status   11/09/2023 Room Air  Final     FIO2   Date Value Ref Range Status   11/09/2023 21 % Final     11/8/23 ABG 7.21/62.8/79.1    March 2019 split-night sleep study showed very severe obstructive sleep apnea with an AHI of 113/h. CPAP titration suboptimal.  Optimal BiPAP titration noted at pressure of 20/16 cmH2O.    Assessment / Plan      Assessment/Plan:    Diagnoses and all orders for this visit:    1. Shortness of breath (Primary)  -     6 Minute Walk Test; Future  Likely multifactorial. No drop in O2 saturation during walk test performed today.    2. Chronic obstructive pulmonary disease, unspecified COPD type  -     albuterol sulfate  (90 Base) MCG/ACT inhaler; Inhale 2 puffs Every 4 (Four) Hours As Needed for Wheezing or Shortness of Air (cough).  Dispense: 8 g; Refill: 0  -     Complete PFT - Pre & Post Bronchodilator; Future  -     Alpha - 1 - Antitrypsin Deficiency; Future  Continue empiric treatment and further assess with full PFTs. He will complete his current Symbicort inhaler from hospitalization. We discussed the risk and benefits of inhaled corticosteroids.  Patient instructed to take them on a regular basis as prescribed. Patient instructed to rinse their mouth out after each use. Once Symbicort it finished, he will begin Anoro as recently prescribed by his PCP. Patient instructed to contact their insurance company and make sure that the medications prescribed are on their formulary and the lowest cost/tier for them. They will call the clinic back if different medications need to prescribed.     3. MADELAINE (obstructive sleep apnea)  -     BIPAP / CPAP Adjustment; Future  Patient instructed to resume nightly PAP use. The patient was counseled on the risks of untreated sleep apnea and voiced understanding. Will request compliance report and ask that [employer] ensures that his machine is in working order. Follow up in the sleep clinic after having Pfts to ensure that his MADELAINE is being optimally managed. Advised to avoid alcohol use.    4. Chronic respiratory failure with hypercapnia  -     BIPAP / CPAP Adjustment; Future  Obesity-hypoventilation syndrome is likely most contributing, +/- COPD as noted above.  A significant amount of weight loss is necessary to aid in symptom control related to comorbid conditions. The patient was counseled on the need to use sedating medications cautiously, as this can worsen their respiratory status.     5. Cigarette smoker  Complete cessation advised.     Discussed considering referral to behavioral health to help manage any mental health issues/substance abuse. He declines referral and will further discuss with his PCP.     Follow Up:   Return in about 3 months (around 2/28/2024) for Recheck.  The patient was counseled on diagnostic results, risks and benefits of treatment options, risk factor modifications and the importance of treatment compliance. The patient was advised to contact the clinic with concerns or worsening symptoms.     AMY Kim  Pulmonary Medicine [facility]

## 2023-11-30 ENCOUNTER — READMISSION MANAGEMENT (OUTPATIENT)
Dept: CALL CENTER | Facility: HOSPITAL | Age: 53
End: 2023-11-30
Payer: COMMERCIAL

## 2023-11-30 DIAGNOSIS — J44.9 CHRONIC OBSTRUCTIVE PULMONARY DISEASE, UNSPECIFIED COPD TYPE: Primary | ICD-10-CM

## 2023-11-30 DIAGNOSIS — J44.1 COPD EXACERBATION: ICD-10-CM

## 2023-11-30 RX ORDER — LEVALBUTEROL TARTRATE 45 UG/1
1-2 AEROSOL, METERED ORAL EVERY 4 HOURS PRN
Qty: 15 G | Refills: 5 | Status: SHIPPED | OUTPATIENT
Start: 2023-11-30

## 2023-11-30 NOTE — OUTREACH NOTE
Medical Week 3 Survey      Flowsheet Row Responses   [facility] patient discharged from? [facility]   Does the patient have one of the following disease processes/diagnoses(primary or secondary)? Other   Week 3 attempt successful? Yes   Call start time 1011   Call end time 1016   Discharge diagnosis Acute resp failure   Person spoke with today (if not patient) and relationship Mother   Is the patient taking all medications as directed (includes completed medication regime)? Yes   Does the patient have a primary care provider?  Yes   Does the patient have an appointment with their PCP within 7 days of discharge? Yes   Has the patient kept scheduled appointments due by today? Yes   What is the Home health agency?  [facility]   Has home health visited the patient within 72 hours of discharge? Yes   Psychosocial comments Stress over divorce   Did the patient receive a copy of their discharge instructions? Yes   What is the patient's perception of their health status since discharge? Improving   Is the patient/caregiver able to teach back the hierarchy of who to call/visit for symptoms/problems? PCP, Specialist, Home health nurse, Urgent Care, ED, 911 Yes   Week 3 Call Completed? Yes   Graduated Yes   Graduated/Revoked comments Mother reports Pt is still under alot of stress from going through a Divorce. She states Pt is trying to take care of himself and is going to appts and taking meds.   Call end time 1016            BETSEY LUNDY - Registered Nurse

## 2023-12-01 VITALS
HEART RATE: 96 BPM | OXYGEN SATURATION: 99 % | SYSTOLIC BLOOD PRESSURE: 154 MMHG | WEIGHT: 290 LBS | BODY MASS INDEX: 39.28 KG/M2 | DIASTOLIC BLOOD PRESSURE: 99 MMHG | TEMPERATURE: 97.9 F | RESPIRATION RATE: 20 BRPM | HEIGHT: 72 IN

## 2023-12-01 PROCEDURE — 96372 THER/PROPH/DIAG INJ SC/IM: CPT

## 2023-12-01 PROCEDURE — 99283 EMERGENCY DEPT VISIT LOW MDM: CPT

## 2023-12-02 ENCOUNTER — HOSPITAL ENCOUNTER (EMERGENCY)
Facility: HOSPITAL | Age: 53
Discharge: HOME | End: 2023-12-02
Attending: EMERGENCY MEDICINE
Payer: COMMERCIAL

## 2023-12-02 DIAGNOSIS — K04.7 DENTAL ABSCESS: Primary | ICD-10-CM

## 2023-12-02 PROCEDURE — 25010000002 KETOROLAC TROMETHAMINE PER 15 MG: Performed by: NURSE PRACTITIONER

## 2023-12-02 RX ORDER — AMOXICILLIN AND CLAVULANATE POTASSIUM 875; 125 MG/1; MG/1
1 TABLET, FILM COATED ORAL ONCE
Status: COMPLETED | OUTPATIENT
Start: 2023-12-02 | End: 2023-12-02

## 2023-12-02 RX ORDER — AMOXICILLIN AND CLAVULANATE POTASSIUM 875; 125 MG/1; MG/1
1 TABLET, FILM COATED ORAL 2 TIMES DAILY
Qty: 14 TABLET | Refills: 0 | Status: SHIPPED | OUTPATIENT
Start: 2023-12-02 | End: 2023-12-09

## 2023-12-02 RX ORDER — KETOROLAC TROMETHAMINE 30 MG/ML
30 INJECTION, SOLUTION INTRAMUSCULAR; INTRAVENOUS ONCE
Status: COMPLETED | OUTPATIENT
Start: 2023-12-02 | End: 2023-12-02

## 2023-12-02 RX ORDER — LIDOCAINE HYDROCHLORIDE 20 MG/ML
10 SOLUTION OROPHARYNGEAL ONCE
Status: COMPLETED | OUTPATIENT
Start: 2023-12-02 | End: 2023-12-02

## 2023-12-02 RX ADMIN — KETOROLAC TROMETHAMINE 30 MG: 30 INJECTION, SOLUTION INTRAMUSCULAR at 01:20

## 2023-12-02 RX ADMIN — AMOXICILLIN AND CLAVULANATE POTASSIUM 1 TABLET: 875; 125 TABLET, FILM COATED ORAL at 01:18

## 2023-12-02 RX ADMIN — LIDOCAINE HYDROCHLORIDE 10 ML: 20 SOLUTION ORAL at 01:19

## 2023-12-02 NOTE — ED PROVIDER NOTES
"Subjective:  History of Present Illness:    Patient is a 53-year-old male without contributing health history.  Presents to the ER tonight for lower left dental pain x3 days.  Denies fever.  Denies OTC medication or other home remedy.  Denies alleviating or exacerbating factors.    Nurses Notes reviewed and agree, including vitals, allergies, social history and prior medical history.     REVIEW OF SYSTEMS: All systems reviewed and not pertinent unless noted.  Review of Systems   HENT:  Positive for dental problem.    All other systems reviewed and are negative.      Past Medical History:   Diagnosis Date    Arthritis     Bulging lumbar disc     Depression 2017    Hypertension     Impaired mobility     Low back pain     Obesity 2020    Pinched nerve     Sleep apnea     Visual impairment 2020       Allergies:    Patient has no known allergies.      No past surgical history on file.      Social History     Socioeconomic History    Marital status:    Tobacco Use    Smoking status: Every Day     Packs/day: 1.00     Years: 15.00     Additional pack years: 0.00     Total pack years: 15.00     Types: Cigarettes     Passive exposure: Current    Smokeless tobacco: Former     Types: Snuff   Vaping Use    Vaping Use: Never used   Substance and Sexual Activity    Alcohol use: Yes     Comment: 4 drinks a day    Drug use: Not Currently     Types: Marijuana    Sexual activity: Yes     Partners: Female     Birth control/protection: None         Family History   Problem Relation Age of Onset    Hypertension Father     Sleep apnea Paternal Uncle     Alcohol abuse Maternal Grandfather     Arthritis Maternal Grandmother     Arthritis Paternal Grandmother        Objective  Physical Exam:  /99 (BP Location: Right arm, Patient Position: Sitting)   Pulse 96   Temp 97.9 °F (36.6 °C) (Oral)   Resp 20   Ht 182.9 cm (72\")   Wt 132 kg (290 lb)   SpO2 99%   BMI 39.33 kg/m²      Physical Exam  Vitals and nursing note reviewed. "   Constitutional:       Appearance: Normal appearance. He is normal weight.   HENT:      Head: Normocephalic and atraumatic.      Nose: Nose normal.      Mouth/Throat:      Mouth: Mucous membranes are moist.      Pharynx: Oropharynx is clear.      Comments: Decaying tooth noted on lower left back jawline.  Eyes:      Conjunctiva/sclera: Conjunctivae normal.      Pupils: Pupils are equal, round, and reactive to light.   Cardiovascular:      Rate and Rhythm: Normal rate and regular rhythm.   Pulmonary:      Effort: Pulmonary effort is normal.      Breath sounds: Normal breath sounds.   Abdominal:      General: Abdomen is flat. Bowel sounds are normal.      Palpations: Abdomen is soft.   Musculoskeletal:         General: Normal range of motion.      Cervical back: Normal range of motion and neck supple.   Skin:     General: Skin is warm and dry.      Capillary Refill: Capillary refill takes less than 2 seconds.   Neurological:      General: No focal deficit present.      Mental Status: He is alert and oriented to person, place, and time. Mental status is at baseline.   Psychiatric:         Mood and Affect: Mood normal.         Behavior: Behavior normal.         Thought Content: Thought content normal.         Judgment: Judgment normal.         Procedures    ED Course:         Lab Results (last 24 hours)       ** No results found for the last 24 hours. **             No radiology results from the last 24 hrs       MDM      Initial impression of presenting illness: Patient is a 53-year-old male without contributing health history.  Presents to the ER [facility] for lower left dental pain x3 days.  Denies fever.  Denies OTC medication or other home remedy.  Denies alleviating or exacerbating factors.    DDX: includes but is not limited to: Dental carry, dental fracture, dental abscess or other    Patient arrives stable with vitals interpreted by myself.     Pertinent features from physical exam: There is a decaying tooth  noted on the lower left jawline.  Mild asymmetry to left jawline.  Otherwise assessment is negative..    Initial diagnostic plan: N/A    Results from initial plan were reviewed and interpreted by me revealing N/A    Diagnostic information from other sources: Chart review    Interventions / Re-evaluation: Vital signs stable throughout encounter.  Patient received viscous lidocaine tooth balls.  Augmentin, and Toradol injection while in ER.    Results/clinical rationale were discussed with patient    Consultations/Discussion of results with other physicians: N/A    Disposition plan: Patient is hemodynamically stable nontoxic-appearing appropriate for discharge.  We will have him follow-up with dentist and PCP within the week.  Follow-up with ER for new or worse symptoms.  -----        Final diagnoses:   Dental abscess          Forest Ramirez, APRN  12/02/23 0151

## 2023-12-02 NOTE — DISCHARGE INSTRUCTIONS
Please follow-up with your PCP in 1 week.  Follow-up with dentist soon as possible.  Follow-up with ER for new or worse symptoms.

## 2023-12-05 ENCOUNTER — TELEPHONE (OUTPATIENT)
Dept: FAMILY MEDICINE CLINIC | Facility: CLINIC | Age: 53
End: 2023-12-05

## 2023-12-05 NOTE — TELEPHONE ENCOUNTER
Caller: Dharmesh Samuel    Relationship: Self    Best call back number: [phone]    What is the medical concern/diagnosis: PT REQUEST A THERAPIST TO HELP HIM THROUGH HIS DIVORCE    What is the provider, practice or medical service name: SOMEWHERE IN [place] AND WHOEVER PCP RECOMMEND.

## 2023-12-06 DIAGNOSIS — F43.21 SITUATIONAL DEPRESSION: Primary | ICD-10-CM

## 2023-12-07 NOTE — TELEPHONE ENCOUNTER
Attempted to call patient to schedule his appointment with SELENA Bansal here in [place]. Telephone number [phone] automated message states call cannot be completed as dialed. Unable to reach patient by telephone. [employer] message sent to patient to contact my direct extension at [phone] to discuss scheduling his appointment.

## 2023-12-08 NOTE — TELEPHONE ENCOUNTER
Attempt #2 to call patient to schedule at [phone]. Again, Message says call cannot be completed as dialed. Try call again.  Patient has not yet read his [employer] message per chart.

## 2023-12-13 ENCOUNTER — OFFICE VISIT (OUTPATIENT)
Dept: FAMILY MEDICINE CLINIC | Facility: CLINIC | Age: 53
End: 2023-12-13
Payer: COMMERCIAL

## 2023-12-13 VITALS
HEIGHT: 72 IN | DIASTOLIC BLOOD PRESSURE: 90 MMHG | SYSTOLIC BLOOD PRESSURE: 125 MMHG | WEIGHT: 288 LBS | BODY MASS INDEX: 39.01 KG/M2 | HEART RATE: 63 BPM | OXYGEN SATURATION: 98 %

## 2023-12-13 DIAGNOSIS — N18.2 CKD (CHRONIC KIDNEY DISEASE) STAGE 2, GFR 60-89 ML/MIN: ICD-10-CM

## 2023-12-13 DIAGNOSIS — I10 ESSENTIAL HYPERTENSION: Primary | ICD-10-CM

## 2023-12-13 DIAGNOSIS — M32.9 LUPUS: ICD-10-CM

## 2023-12-13 DIAGNOSIS — E66.01 CLASS 3 SEVERE OBESITY DUE TO EXCESS CALORIES WITH SERIOUS COMORBIDITY AND BODY MASS INDEX (BMI) OF 40.0 TO 44.9 IN ADULT: ICD-10-CM

## 2023-12-13 DIAGNOSIS — F33.0 MILD EPISODE OF RECURRENT MAJOR DEPRESSIVE DISORDER: ICD-10-CM

## 2023-12-13 DIAGNOSIS — R73.03 PRE-DIABETES: ICD-10-CM

## 2023-12-13 DIAGNOSIS — M06.9 RHEUMATOID ARTHRITIS INVOLVING MULTIPLE SITES, UNSPECIFIED WHETHER RHEUMATOID FACTOR PRESENT: ICD-10-CM

## 2023-12-13 RX ORDER — TORSEMIDE 10 MG/1
10 TABLET ORAL DAILY
Qty: 30 TABLET | Refills: 2 | Status: SHIPPED | OUTPATIENT
Start: 2023-12-13

## 2023-12-13 NOTE — PROGRESS NOTES
Subjective     Chief Complaint:    Chief Complaint   Patient presents with    Chronic Kidney Disease    Hypertension       History of Present Illness:   HTN, Somewhat refractory, on coreg, norvasc, lisinopril, demedax, chlorithiadone  CKD, 3b (aktar)  Lupus and RA, managed by Jerry, continues mtx and plaquenil, was on enbril but insurance will not pay for it anymore so he is off  Depression/anxiety on lexapro, notes worsening situational stress due to him and his wife . He is tolerating ok. He is not drinking as much as he used to, has appt with counseling   Chronic low back pain, chronic neck pain, chronic numbness and tingling of his hands bilaterally. Taking gabapentin and tramadol prn   Recent hospital stay for resp failure, he is using anoro, noted cough in mornings, clear sputum, then clears up, notes improvement in wheezing, smoking half pack per day   Gerd on ppi       Review of Systems  Gen- No fevers, chills  CV- No chest pain, palpitations  Resp- No cough, dyspnea  GI- No N/V/D, abd pain  Neuro-No dizziness, headaches      I have reviewed and/or updated the patient's past medical, surgical, family, social history and problem list as appropriate.     Medications:    Current Outpatient Medications:     amLODIPine (NORVASC) 10 MG tablet, Take 1 tablet by mouth Daily., Disp: 90 tablet, Rfl: 1    carvedilol (COREG) 25 MG tablet, Take 1 tablet by mouth 2 (Two) Times a Day With Meals., Disp: , Rfl:     chlorthalidone (HYGROTON) 25 MG tablet, Take 1 tablet by mouth Daily., Disp: , Rfl:     cycloSPORINE (RESTASIS) 0.05 % ophthalmic emulsion, Administer 1 drop to both eyes 2 (Two) Times a Day., Disp: 10 each, Rfl: 5    escitalopram (LEXAPRO) 20 MG tablet, Take 1 tablet by mouth Every Morning., Disp: 90 tablet, Rfl: 1    folic acid (FOLVITE) 1 MG tablet, Take 1 tablet by mouth Daily., Disp: 90 tablet, Rfl: 3    gabapentin (NEURONTIN) 600 MG tablet, TAKE 1 TABLET BY MOUTH THREE TIMES DAILY, Disp: 90  "tablet, Rfl: 1    levalbuterol (XOPENEX HFA) 45 MCG/ACT inhaler, Inhale 1-2 puffs Every 4 (Four) Hours As Needed for Wheezing., Disp: 15 g, Rfl: 5    lisinopril (PRINIVIL,ZESTRIL) 40 MG tablet, Take 1 tablet by mouth Daily., Disp: 90 tablet, Rfl: 1    methotrexate 2.5 MG tablet, Take 6 tablets by mouth 1 (One) Time Per Week., Disp: , Rfl:     pantoprazole (Protonix) 40 MG EC tablet, Take 1 tablet by mouth Daily., Disp: 30 tablet, Rfl: 2    predniSONE (DELTASONE) 10 MG tablet, TAKE 1 TABLET BY MOUTH ONCE DAILY AS NEEDED FOR 2-5 DAYS FOR A FLARE UP. MAY REPEAT ONCE WEEKLY., Disp: , Rfl:     torsemide (DEMADEX) 10 MG tablet, Take 1 tablet by mouth Daily., Disp: 30 tablet, Rfl: 2    traMADol (ULTRAM) 50 MG tablet, Take 1 tablet by mouth Every 12 (Twelve) Hours As Needed for Moderate Pain., Disp: 60 tablet, Rfl: 2    Umeclidinium-Vilanterol (Anoro Ellipta) 62.5-25 MCG/ACT aerosol powder  inhaler, Inhale 1 puff Daily., Disp: 60 each, Rfl: 5    Allergies:  No Known Allergies    Objective     Vital Signs:   Vitals:    12/13/23 1557   BP: 125/90   Pulse: 63   SpO2: 98%   Weight: 131 kg (288 lb)   Height: 182.9 cm (72\")     Body mass index is 39.06 kg/m².    Physical Exam:    Physical Exam  Vitals and nursing note reviewed.   Constitutional:       Appearance: He is well-developed.   HENT:      Head: Normocephalic and atraumatic.   Eyes:      Pupils: Pupils are equal, round, and reactive to light.   Cardiovascular:      Rate and Rhythm: Normal rate and regular rhythm.      Heart sounds: Normal heart sounds.   Pulmonary:      Effort: Pulmonary effort is normal.      Breath sounds: Normal breath sounds.   Abdominal:      General: Bowel sounds are normal.   Musculoskeletal:      Cervical back: Neck supple.   Skin:     General: Skin is warm and dry.   Neurological:      General: No focal deficit present.      Mental Status: He is alert and oriented to person, place, and time.   Psychiatric:         Mood and Affect: Mood normal.    "      Behavior: Behavior normal.         Assessment / Plan     Assessment/Plan:   Problem List Items Addressed This Visit       Essential hypertension - Primary    Relevant Medications    carvedilol (COREG) 25 MG tablet    chlorthalidone (HYGROTON) 25 MG tablet    lisinopril (PRINIVIL,ZESTRIL) 40 MG tablet    amLODIPine (NORVASC) 10 MG tablet    torsemide (DEMADEX) 10 MG tablet    Class 3 severe obesity due to excess calories with serious comorbidity and body mass index (BMI) of 40.0 to 44.9 in adult    Lupus    Relevant Medications    predniSONE (DELTASONE) 10 MG tablet    CKD (chronic kidney disease) stage 2, GFR 60-89 ml/min    Relevant Medications    chlorthalidone (HYGROTON) 25 MG tablet    torsemide (DEMADEX) 10 MG tablet    Mild episode of recurrent major depressive disorder    Relevant Medications    escitalopram (LEXAPRO) 20 MG tablet    Rheumatoid arthritis involving multiple sites    Relevant Medications    predniSONE (DELTASONE) 10 MG tablet    Pre-diabetes     -- continue current meds  -- he seems to be in a better placed with his mood as well, still plan for counseling  -- keep f/u with specialist   Controlled Substance  Prescription  As part of this patient's treatment plan I am prescribing controlled substances. The patient has been made aware of appropriate use of such medications, including potential risk of somnolence, limited ability to drive and /or work safely, and potential for dependence or overdose. It has also been made clear that these medications are for use by this patient only, without concomitant use of alcohol or other substances unless prescribed. Andrews reviewed and appropriate     Discussed plan of care in detail with pt today; pt verb understanding and agrees.    Follow up:  3 months     Electronically signed by AMY Escamilla   12/13/2023 16:04 EST      Please note that portions of this note were completed with a voice recognition program.

## 2023-12-19 ENCOUNTER — OFFICE VISIT (OUTPATIENT)
Dept: FAMILY MEDICINE CLINIC | Facility: CLINIC | Age: 53
End: 2023-12-19
Payer: COMMERCIAL

## 2023-12-19 VITALS
HEIGHT: 72 IN | BODY MASS INDEX: 38.47 KG/M2 | DIASTOLIC BLOOD PRESSURE: 84 MMHG | HEART RATE: 90 BPM | TEMPERATURE: 98.3 F | WEIGHT: 284 LBS | OXYGEN SATURATION: 98 % | SYSTOLIC BLOOD PRESSURE: 132 MMHG

## 2023-12-19 DIAGNOSIS — G89.29 CHRONIC BILATERAL LOW BACK PAIN WITH LEFT-SIDED SCIATICA: Primary | ICD-10-CM

## 2023-12-19 DIAGNOSIS — M54.42 CHRONIC BILATERAL LOW BACK PAIN WITH LEFT-SIDED SCIATICA: Primary | ICD-10-CM

## 2023-12-19 DIAGNOSIS — Z51.81 ENCOUNTER FOR THERAPEUTIC DRUG MONITORING: ICD-10-CM

## 2023-12-19 NOTE — PROGRESS NOTES
Subjective     Chief Complaint:    Chief Complaint   Patient presents with    discuss labs     From a hospital       History of Present Illness:   Here with questions about his toxicology report from a November hospital stay that was obtained in the emergency department.  Drug screen positive for buprenorphine.  He denies use of this medication.  Normal drug screen in 2022.  I do prescribe him gabapentin and tramadol for chronic back pain and he is due for yearly drug screen which will be obtained today.  He does have history of alcoholism but has been cutting back drastically.  Continues to have some situational stress related to divorce.    Review of Systems  Gen- No fevers, chills  CV- No chest pain, palpitations  Resp- No cough, dyspnea  GI- No N/V/D, abd pain  Neuro-No dizziness, headaches      I have reviewed and/or updated the patient's past medical, surgical, family, social history and problem list as appropriate.     Medications:    Current Outpatient Medications:     amLODIPine (NORVASC) 10 MG tablet, Take 1 tablet by mouth Daily., Disp: 90 tablet, Rfl: 1    carvedilol (COREG) 25 MG tablet, Take 1 tablet by mouth 2 (Two) Times a Day With Meals., Disp: , Rfl:     chlorthalidone (HYGROTON) 25 MG tablet, Take 1 tablet by mouth Daily., Disp: , Rfl:     cycloSPORINE (RESTASIS) 0.05 % ophthalmic emulsion, Administer 1 drop to both eyes 2 (Two) Times a Day., Disp: 10 each, Rfl: 5    escitalopram (LEXAPRO) 20 MG tablet, Take 1 tablet by mouth Every Morning., Disp: 90 tablet, Rfl: 1    folic acid (FOLVITE) 1 MG tablet, Take 1 tablet by mouth Daily., Disp: 90 tablet, Rfl: 3    gabapentin (NEURONTIN) 600 MG tablet, TAKE 1 TABLET BY MOUTH THREE TIMES DAILY, Disp: 90 tablet, Rfl: 1    levalbuterol (XOPENEX HFA) 45 MCG/ACT inhaler, Inhale 1-2 puffs Every 4 (Four) Hours As Needed for Wheezing., Disp: 15 g, Rfl: 5    lisinopril (PRINIVIL,ZESTRIL) 40 MG tablet, Take 1 tablet by mouth Daily., Disp: 90 tablet, Rfl: 1    " methotrexate 2.5 MG tablet, Take 6 tablets by mouth 1 (One) Time Per Week., Disp: , Rfl:     pantoprazole (Protonix) 40 MG EC tablet, Take 1 tablet by mouth Daily., Disp: 30 tablet, Rfl: 2    predniSONE (DELTASONE) 10 MG tablet, TAKE 1 TABLET BY MOUTH ONCE DAILY AS NEEDED FOR 2-5 DAYS FOR A FLARE UP. MAY REPEAT ONCE WEEKLY., Disp: , Rfl:     torsemide (DEMADEX) 10 MG tablet, Take 1 tablet by mouth Daily., Disp: 30 tablet, Rfl: 2    traMADol (ULTRAM) 50 MG tablet, Take 1 tablet by mouth Every 12 (Twelve) Hours As Needed for Moderate Pain., Disp: 60 tablet, Rfl: 2    Umeclidinium-Vilanterol (Anoro Ellipta) 62.5-25 MCG/ACT aerosol powder  inhaler, Inhale 1 puff Daily., Disp: 60 each, Rfl: 5    Allergies:  No Known Allergies    Objective     Vital Signs:   Vitals:    12/19/23 1302   BP: 132/84   Pulse: 90   Temp: 98.3 °F (36.8 °C)   SpO2: 98%   Weight: 129 kg (284 lb)   Height: 182.9 cm (72\")     Body mass index is 38.52 kg/m².    Physical Exam:    Physical Exam  HENT:      Head: Normocephalic and atraumatic.      Nose: Nose normal.   Eyes:      Pupils: Pupils are equal, round, and reactive to light.   Cardiovascular:      Rate and Rhythm: Normal rate.   Pulmonary:      Effort: Pulmonary effort is normal.   Neurological:      General: No focal deficit present.      Mental Status: He is alert and oriented to person, place, and time.   Psychiatric:         Mood and Affect: Mood normal.         Behavior: Behavior normal.         Assessment / Plan     Assessment/Plan:   Problem List Items Addressed This Visit       Chronic bilateral low back pain with left-sided sciatica - Primary    Relevant Medications    gabapentin (NEURONTIN) 600 MG tablet    traMADol (ULTRAM) 50 MG tablet     --UDS updated today  --Discussed toxicology results with him    Discussed plan of care in detail with pt today; pt verb understanding and agrees.    Follow up:  As scheduled    Electronically signed by AMY Escamilla   12/19/2023 13:27 " EST      Please note that portions of this note were completed with a voice recognition program.

## 2023-12-22 ENCOUNTER — TELEPHONE (OUTPATIENT)
Dept: FAMILY MEDICINE CLINIC | Facility: CLINIC | Age: 53
End: 2023-12-22

## 2023-12-22 NOTE — TELEPHONE ENCOUNTER
Caller: Dharmesh Samuel    Relationship: Self    Best call back number: [phone]     What medication are you requesting: SOMETHING TO QUIT DRINKING    What are your current symptoms: ALCOHOL USE    How long have you been experiencing symptoms:     Have you had these symptoms before:    [x] Yes  [] No    Have you been treated for these symptoms before:   [] Yes  [x] No    If a prescription is needed, what is your preferred pharmacy and phone number: [facility] [address] [place], [place] - [address] [place] [address] [phone] [facility] [phone]      Additional notes:

## 2023-12-23 LAB — DRUGS UR: NORMAL

## 2024-01-12 DIAGNOSIS — J44.9 CHRONIC OBSTRUCTIVE PULMONARY DISEASE, UNSPECIFIED COPD TYPE: ICD-10-CM

## 2024-01-24 ENCOUNTER — OFFICE VISIT (OUTPATIENT)
Dept: BEHAVIORAL HEALTH | Facility: CLINIC | Age: 54
End: 2024-01-24
Payer: COMMERCIAL

## 2024-01-24 DIAGNOSIS — Z63.5 FAMILY DISRUPTION DUE TO DIVORCE OR LEGAL SEPARATION: ICD-10-CM

## 2024-01-24 DIAGNOSIS — F43.23 ADJUSTMENT DISORDER WITH MIXED ANXIETY AND DEPRESSED MOOD: Primary | ICD-10-CM

## 2024-01-24 PROCEDURE — 90791 PSYCH DIAGNOSTIC EVALUATION: CPT | Performed by: COUNSELOR

## 2024-01-24 SDOH — SOCIAL STABILITY - SOCIAL INSECURITY: DISRUPTION OF FAMILY BY SEPARATION AND DIVORCE: Z63.5

## 2024-02-07 ENCOUNTER — TELEPHONE (OUTPATIENT)
Dept: BEHAVIORAL HEALTH | Facility: CLINIC | Age: 54
End: 2024-02-07
Payer: COMMERCIAL

## 2024-02-07 NOTE — TELEPHONE ENCOUNTER
PATIENT CALLED IN ASKING FOR AN ASSESSMENT THAT JIM WAS SUPPOSED TO DO FOR HIM FOR COURT AND WOULD LIKE A CALL BACK IF [phone] .

## 2024-02-20 DIAGNOSIS — F33.0 MILD EPISODE OF RECURRENT MAJOR DEPRESSIVE DISORDER: ICD-10-CM

## 2024-02-20 RX ORDER — ESCITALOPRAM OXALATE 20 MG/1
20 TABLET ORAL EVERY MORNING
Qty: 90 TABLET | Refills: 0 | Status: SHIPPED | OUTPATIENT
Start: 2024-02-20

## 2024-03-07 DIAGNOSIS — H04.123 DRY EYES: ICD-10-CM

## 2024-03-07 DIAGNOSIS — K21.9 GASTROESOPHAGEAL REFLUX DISEASE WITHOUT ESOPHAGITIS: ICD-10-CM

## 2024-03-07 DIAGNOSIS — I10 ESSENTIAL HYPERTENSION: ICD-10-CM

## 2024-03-07 DIAGNOSIS — G89.29 CHRONIC BILATERAL LOW BACK PAIN WITH LEFT-SIDED SCIATICA: ICD-10-CM

## 2024-03-07 DIAGNOSIS — F33.0 MILD EPISODE OF RECURRENT MAJOR DEPRESSIVE DISORDER: ICD-10-CM

## 2024-03-07 DIAGNOSIS — M54.42 CHRONIC BILATERAL LOW BACK PAIN WITH LEFT-SIDED SCIATICA: ICD-10-CM

## 2024-03-07 DIAGNOSIS — J41.8 MIXED SIMPLE AND MUCOPURULENT CHRONIC BRONCHITIS: ICD-10-CM

## 2024-03-07 RX ORDER — CYCLOSPORINE 0.5 MG/ML
1 EMULSION OPHTHALMIC 2 TIMES DAILY
Qty: 10 EACH | Refills: 5 | Status: SHIPPED | OUTPATIENT
Start: 2024-03-07

## 2024-03-07 RX ORDER — ESCITALOPRAM OXALATE 20 MG/1
20 TABLET ORAL EVERY MORNING
Qty: 90 TABLET | Refills: 0 | Status: SHIPPED | OUTPATIENT
Start: 2024-03-07

## 2024-03-07 RX ORDER — UMECLIDINIUM BROMIDE AND VILANTEROL TRIFENATATE 62.5; 25 UG/1; UG/1
1 POWDER RESPIRATORY (INHALATION)
Qty: 60 EACH | Refills: 5 | Status: SHIPPED | OUTPATIENT
Start: 2024-03-07

## 2024-03-07 RX ORDER — LISINOPRIL 40 MG/1
40 TABLET ORAL DAILY
Qty: 90 TABLET | Refills: 1 | Status: SHIPPED | OUTPATIENT
Start: 2024-03-07

## 2024-03-07 RX ORDER — PANTOPRAZOLE SODIUM 40 MG/1
40 TABLET, DELAYED RELEASE ORAL DAILY
Qty: 30 TABLET | Refills: 2 | Status: SHIPPED | OUTPATIENT
Start: 2024-03-07

## 2024-03-07 RX ORDER — FOLIC ACID 1 MG/1
1000 TABLET ORAL DAILY
Qty: 90 TABLET | Refills: 3 | Status: SHIPPED | OUTPATIENT
Start: 2024-03-07

## 2024-03-07 RX ORDER — AMLODIPINE BESYLATE 10 MG/1
10 TABLET ORAL DAILY
Qty: 90 TABLET | Refills: 1 | Status: SHIPPED | OUTPATIENT
Start: 2024-03-07

## 2024-03-07 RX ORDER — TORSEMIDE 10 MG/1
10 TABLET ORAL DAILY
Qty: 30 TABLET | Refills: 2 | Status: SHIPPED | OUTPATIENT
Start: 2024-03-07

## 2024-03-07 NOTE — TELEPHONE ENCOUNTER
Rx Refill Note  Requested Prescriptions     Pending Prescriptions Disp Refills    traMADol (ULTRAM) 50 MG tablet 60 tablet 2     Sig: Take 1 tablet by mouth Every 12 (Twelve) Hours As Needed for Moderate Pain.    gabapentin (NEURONTIN) 600 MG tablet 90 tablet 1     Sig: Take 1 tablet by mouth 3 (Three) Times a Day.     Signed Prescriptions Disp Refills    Umeclidinium-Vilanterol (Anoro Ellipta) 62.5-25 MCG/ACT aerosol powder  inhaler 60 each 5     Sig: Inhale 1 puff Daily.     Authorizing Provider: ALEX BERRIOS     Ordering User: MARTHA PIERRE    torsemide (DEMADEX) 10 MG tablet 30 tablet 2     Sig: Take 1 tablet by mouth Daily.     Authorizing Provider: ALEX BERRIOS     Ordering User: MARTHA PIERRE    pantoprazole (Protonix) 40 MG EC tablet 30 tablet 2     Sig: Take 1 tablet by mouth Daily.     Authorizing Provider: ALEX BERRIOS User: MARTHA PIERRE    lisinopril (PRINIVIL,ZESTRIL) 40 MG tablet 90 tablet 1     Sig: Take 1 tablet by mouth Daily.     Authorizing Provider: ALEX BERRIOS User: MARTHA PIERRE    folic acid (FOLVITE) 1 MG tablet 90 tablet 3     Sig: Take 1 tablet by mouth Daily.     Authorizing Provider: ALEX BERRIOS User: MARTHA PIERRE    escitalopram (LEXAPRO) 20 MG tablet 90 tablet 0     Sig: Take 1 tablet by mouth Every Morning.     Authorizing Provider: ALEX BERRIOS User: MARTHA PIERRE    cycloSPORINE (RESTASIS) 0.05 % ophthalmic emulsion 10 each 5     Sig: Administer 1 drop to both eyes 2 (Two) Times a Day.     Authorizing Provider: ALEX BERRIOS User: MARTHA PIERRE    amLODIPine (NORVASC) 10 MG tablet 90 tablet 1     Sig: Take 1 tablet by mouth Daily.     Authorizing Provider: ALEX BERRIOS User: MARTHA PIERRE      Last office visit with prescribing clinician: 12/19/2023   Last telemedicine visit with prescribing clinician: Visit date not found   Next office  visit with prescribing clinician: Visit date not found                         Would you like a call back once the refill request has been completed: [] Yes [] No    If the office needs to give you a call back, can they leave a voicemail: [] Yes [] No    Angeline Christie MA  03/07/24, 14:28 EST

## 2024-03-08 RX ORDER — TRAMADOL HYDROCHLORIDE 50 MG/1
50 TABLET ORAL EVERY 12 HOURS PRN
Qty: 60 TABLET | Refills: 2 | Status: SHIPPED | OUTPATIENT
Start: 2024-03-08

## 2024-03-08 RX ORDER — GABAPENTIN 600 MG/1
600 TABLET ORAL 3 TIMES DAILY
Qty: 90 TABLET | Refills: 1 | Status: SHIPPED | OUTPATIENT
Start: 2024-03-08

## 2024-04-05 ENCOUNTER — OFFICE VISIT (OUTPATIENT)
Dept: FAMILY MEDICINE CLINIC | Facility: CLINIC | Age: 54
End: 2024-04-05
Payer: COMMERCIAL

## 2024-04-05 VITALS
SYSTOLIC BLOOD PRESSURE: 162 MMHG | WEIGHT: 275.6 LBS | HEART RATE: 71 BPM | DIASTOLIC BLOOD PRESSURE: 102 MMHG | OXYGEN SATURATION: 97 % | HEIGHT: 72 IN | BODY MASS INDEX: 37.33 KG/M2

## 2024-04-05 DIAGNOSIS — F41.8 SITUATIONAL ANXIETY: Primary | ICD-10-CM

## 2024-04-05 PROCEDURE — 99213 OFFICE O/P EST LOW 20 MIN: CPT | Performed by: NURSE PRACTITIONER

## 2024-04-05 RX ORDER — BUSPIRONE HYDROCHLORIDE 5 MG/1
TABLET ORAL
Qty: 90 TABLET | Refills: 1 | Status: SHIPPED | OUTPATIENT
Start: 2024-04-05

## 2024-04-05 RX ORDER — ETANERCEPT 50 MG/ML
SOLUTION SUBCUTANEOUS
COMMUNITY
Start: 2024-02-05

## 2024-04-05 NOTE — PROGRESS NOTES
Subjective     Chief Complaint:    Chief Complaint   Patient presents with    Med Refill     Pt is wanting to discuss his anxiety medication.     Anxiety       History of Present Illness:   Here with continued situational anxiety related to divorce, currently on lexapro, was out for a few weeks due to issues with health card and his ex wife. Has been back on it for a few weeks  Notes worsening anxiety, fearfully, worries a lot,     Review of Systems  Gen- No fevers, chills  CV- No chest pain, palpitations  Resp- No cough, dyspnea  GI- No N/V/D, abd pain  Neuro-No dizziness, headaches      I have reviewed and/or updated the patient's past medical, surgical, family, social history and problem list as appropriate.     Medications:    Current Outpatient Medications:     amLODIPine (NORVASC) 10 MG tablet, Take 1 tablet by mouth Daily., Disp: 90 tablet, Rfl: 1    carvedilol (COREG) 25 MG tablet, Take 1 tablet by mouth 2 (Two) Times a Day With Meals., Disp: , Rfl:     chlorthalidone (HYGROTON) 25 MG tablet, Take 1 tablet by mouth Daily., Disp: , Rfl:     cycloSPORINE (RESTASIS) 0.05 % ophthalmic emulsion, Administer 1 drop to both eyes 2 (Two) Times a Day., Disp: 10 each, Rfl: 5    Enbrel Mini 50 MG/ML solution cartridge, , Disp: , Rfl:     escitalopram (LEXAPRO) 20 MG tablet, Take 1 tablet by mouth Every Morning., Disp: 90 tablet, Rfl: 0    folic acid (FOLVITE) 1 MG tablet, Take 1 tablet by mouth Daily., Disp: 90 tablet, Rfl: 3    gabapentin (NEURONTIN) 600 MG tablet, Take 1 tablet by mouth 3 (Three) Times a Day., Disp: 90 tablet, Rfl: 1    levalbuterol (XOPENEX HFA) 45 MCG/ACT inhaler, Inhale 1-2 puffs Every 4 (Four) Hours As Needed for Wheezing., Disp: 15 g, Rfl: 5    lisinopril (PRINIVIL,ZESTRIL) 40 MG tablet, Take 1 tablet by mouth Daily., Disp: 90 tablet, Rfl: 1    methotrexate 2.5 MG tablet, Take 6 tablets by mouth 1 (One) Time Per Week., Disp: , Rfl:     pantoprazole (Protonix) 40 MG EC tablet, Take 1 tablet  "by mouth Daily., Disp: 30 tablet, Rfl: 2    predniSONE (DELTASONE) 10 MG tablet, TAKE 1 TABLET BY MOUTH ONCE DAILY AS NEEDED FOR 2-5 DAYS FOR A FLARE UP. MAY REPEAT ONCE WEEKLY., Disp: , Rfl:     torsemide (DEMADEX) 10 MG tablet, Take 1 tablet by mouth Daily., Disp: 30 tablet, Rfl: 2    traMADol (ULTRAM) 50 MG tablet, Take 1 tablet by mouth Every 12 (Twelve) Hours As Needed for Moderate Pain., Disp: 60 tablet, Rfl: 2    Umeclidinium-Vilanterol (Anoro Ellipta) 62.5-25 MCG/ACT aerosol powder  inhaler, Inhale 1 puff Daily., Disp: 60 each, Rfl: 5    busPIRone (BUSPAR) 5 MG tablet, 1 po BID, may increase to 2 PO BID after 5 days if needed, Disp: 90 tablet, Rfl: 1    Allergies:  No Known Allergies    Objective     Vital Signs:   Vitals:    04/05/24 1011   BP: (!) 162/102  Comment: pt states he is in some pain today because of his RA   Pulse: 71   SpO2: 97%   Weight: 125 kg (275 lb 9.6 oz)   Height: 182.9 cm (72\")     Body mass index is 37.38 kg/m².    Physical Exam:    Physical Exam  Vitals and nursing note reviewed.   Constitutional:       Appearance: He is well-developed.   HENT:      Head: Normocephalic and atraumatic.   Eyes:      Pupils: Pupils are equal, round, and reactive to light.   Cardiovascular:      Rate and Rhythm: Normal rate and regular rhythm.      Heart sounds: Normal heart sounds.   Pulmonary:      Effort: Pulmonary effort is normal.      Breath sounds: Normal breath sounds.   Musculoskeletal:      Cervical back: Neck supple.   Skin:     General: Skin is warm and dry.   Neurological:      General: No focal deficit present.      Mental Status: He is alert and oriented to person, place, and time.   Psychiatric:         Mood and Affect: Mood normal.         Behavior: Behavior normal.         Assessment / Plan     Assessment/Plan:   Problem List Items Addressed This Visit    None  Visit Diagnoses       Situational anxiety    -  Primary    Relevant Medications    busPIRone (BUSPAR) 5 MG tablet          -- " add buspar to lexapro, continue counseling     Discussed plan of care in detail with pt today; pt verb understanding and agrees.    Follow up:  4 weeks    Electronically signed by AMY Escamilla   04/05/2024 10:18 EDT      Please note that portions of this note were completed with a voice recognition program.

## 2024-04-15 RX ORDER — CARVEDILOL 25 MG/1
25 TABLET ORAL 2 TIMES DAILY WITH MEALS
Qty: 60 TABLET | Refills: 0 | Status: SHIPPED | OUTPATIENT
Start: 2024-04-15

## 2024-05-12 DIAGNOSIS — F41.8 SITUATIONAL ANXIETY: ICD-10-CM

## 2024-05-13 RX ORDER — BUSPIRONE HYDROCHLORIDE 5 MG/1
TABLET ORAL
Qty: 90 TABLET | Refills: 0 | Status: SHIPPED | OUTPATIENT
Start: 2024-05-13

## 2024-05-13 RX ORDER — CARVEDILOL 25 MG/1
25 TABLET ORAL 2 TIMES DAILY WITH MEALS
Qty: 60 TABLET | Refills: 0 | Status: SHIPPED | OUTPATIENT
Start: 2024-05-13

## 2024-05-24 ENCOUNTER — OFFICE VISIT (OUTPATIENT)
Dept: FAMILY MEDICINE CLINIC | Facility: CLINIC | Age: 54
End: 2024-05-24
Payer: COMMERCIAL

## 2024-05-24 VITALS
SYSTOLIC BLOOD PRESSURE: 130 MMHG | BODY MASS INDEX: 36.16 KG/M2 | HEART RATE: 53 BPM | HEIGHT: 72 IN | DIASTOLIC BLOOD PRESSURE: 82 MMHG | OXYGEN SATURATION: 97 % | WEIGHT: 267 LBS

## 2024-05-24 DIAGNOSIS — M06.9 RHEUMATOID ARTHRITIS INVOLVING MULTIPLE SITES, UNSPECIFIED WHETHER RHEUMATOID FACTOR PRESENT: ICD-10-CM

## 2024-05-24 DIAGNOSIS — N18.2 CKD (CHRONIC KIDNEY DISEASE) STAGE 2, GFR 60-89 ML/MIN: ICD-10-CM

## 2024-05-24 DIAGNOSIS — I10 ESSENTIAL HYPERTENSION: ICD-10-CM

## 2024-05-24 DIAGNOSIS — M32.9 LUPUS: ICD-10-CM

## 2024-05-24 DIAGNOSIS — J41.8 MIXED SIMPLE AND MUCOPURULENT CHRONIC BRONCHITIS: ICD-10-CM

## 2024-05-24 DIAGNOSIS — E66.01 CLASS 2 SEVERE OBESITY DUE TO EXCESS CALORIES WITH SERIOUS COMORBIDITY AND BODY MASS INDEX (BMI) OF 36.0 TO 36.9 IN ADULT: Primary | ICD-10-CM

## 2024-05-24 DIAGNOSIS — E66.01 CLASS 3 SEVERE OBESITY DUE TO EXCESS CALORIES WITH SERIOUS COMORBIDITY AND BODY MASS INDEX (BMI) OF 40.0 TO 44.9 IN ADULT: ICD-10-CM

## 2024-05-24 DIAGNOSIS — G89.29 CHRONIC BILATERAL LOW BACK PAIN WITH LEFT-SIDED SCIATICA: ICD-10-CM

## 2024-05-24 DIAGNOSIS — M54.42 CHRONIC BILATERAL LOW BACK PAIN WITH LEFT-SIDED SCIATICA: ICD-10-CM

## 2024-05-24 DIAGNOSIS — F33.0 MILD EPISODE OF RECURRENT MAJOR DEPRESSIVE DISORDER: ICD-10-CM

## 2024-05-24 PROCEDURE — 99214 OFFICE O/P EST MOD 30 MIN: CPT | Performed by: NURSE PRACTITIONER

## 2024-05-24 NOTE — PROGRESS NOTES
Subjective     Chief Complaint:    Chief Complaint   Patient presents with    Anxiety     F/U       History of Present Illness:   F.u anxiety. Was started on buspar and is taking once per day, it is helping, not needing the second dose, continues counseling, goes to [employer] regularly, sleeping well, still adjusting to divorce, denies any side effects from buspar  Continues to loose weight, is not drinking anymore  HTN, on coreg, norvasc, lisinopril, demedax, chlorithiadone  CKD, 3b (aktar)  Lupus and RA, on enbrel and mtx  Chronic low back pain, chronic neck pain, chronic numbness and tingling of his hands bilaterally. Taking gabapentin and tramadol prn  Copd, on anoro, daily, still with some congestion, continues to smoke  Gerd on ppi   Follows with rheum     Review of Systems  Gen- No fevers, chills  CV- No chest pain, palpitations  Resp- No cough, dyspnea  GI- No N/V/D, abd pain  Neuro-No dizziness, headaches      I have reviewed and/or updated the patient's past medical, surgical, family, social history and problem list as appropriate.     Medications:    Current Outpatient Medications:     amLODIPine (NORVASC) 10 MG tablet, Take 1 tablet by mouth Daily., Disp: 90 tablet, Rfl: 1    busPIRone (BUSPAR) 5 MG tablet, TAKE 1 TABLET BY MOUTH TWICE DAILY MAY  INCREASE  TO  2  TABLETS  TWICE  DAILY  AFTER  5  DAYS  IF  NEEDED, Disp: 90 tablet, Rfl: 0    carvedilol (COREG) 25 MG tablet, TAKE 1 TABLET BY MOUTH TWICE DAILY WITH MEALS, Disp: 60 tablet, Rfl: 0    chlorthalidone (HYGROTON) 25 MG tablet, Take 1 tablet by mouth Daily., Disp: , Rfl:     cycloSPORINE (RESTASIS) 0.05 % ophthalmic emulsion, Administer 1 drop to both eyes 2 (Two) Times a Day., Disp: 10 each, Rfl: 5    Enbrel Mini 50 MG/ML solution cartridge, , Disp: , Rfl:     escitalopram (LEXAPRO) 20 MG tablet, Take 1 tablet by mouth Every Morning., Disp: 90 tablet, Rfl: 0    folic acid (FOLVITE) 1 MG tablet, Take 1 tablet by mouth Daily., Disp: 90 tablet,  "Rfl: 3    gabapentin (NEURONTIN) 600 MG tablet, Take 1 tablet by mouth 3 (Three) Times a Day., Disp: 90 tablet, Rfl: 1    lisinopril (PRINIVIL,ZESTRIL) 40 MG tablet, Take 1 tablet by mouth Daily., Disp: 90 tablet, Rfl: 1    methotrexate 2.5 MG tablet, Take 6 tablets by mouth 1 (One) Time Per Week., Disp: , Rfl:     pantoprazole (Protonix) 40 MG EC tablet, Take 1 tablet by mouth Daily., Disp: 30 tablet, Rfl: 2    predniSONE (DELTASONE) 10 MG tablet, TAKE 1 TABLET BY MOUTH ONCE DAILY AS NEEDED FOR 2-5 DAYS FOR A FLARE UP. MAY REPEAT ONCE WEEKLY., Disp: , Rfl:     torsemide (DEMADEX) 10 MG tablet, Take 1 tablet by mouth Daily., Disp: 30 tablet, Rfl: 2    traMADol (ULTRAM) 50 MG tablet, Take 1 tablet by mouth Every 12 (Twelve) Hours As Needed for Moderate Pain., Disp: 60 tablet, Rfl: 2    Umeclidinium-Vilanterol (Anoro Ellipta) 62.5-25 MCG/ACT aerosol powder  inhaler, Inhale 1 puff Daily., Disp: 60 each, Rfl: 5    Allergies:  No Known Allergies    Objective     Vital Signs:   Vitals:    05/24/24 0824   BP: 130/82   Pulse: 53   SpO2: 97%   Weight: 121 kg (267 lb)   Height: 182.9 cm (72\")     Body mass index is 36.21 kg/m².    Physical Exam:    Physical Exam  Vitals and nursing note reviewed.   Constitutional:       Appearance: He is well-developed.   HENT:      Head: Normocephalic and atraumatic.   Eyes:      Pupils: Pupils are equal, round, and reactive to light.   Cardiovascular:      Rate and Rhythm: Normal rate and regular rhythm.      Heart sounds: Normal heart sounds.   Pulmonary:      Effort: Pulmonary effort is normal.      Breath sounds: Normal breath sounds.   Abdominal:      General: Bowel sounds are normal. There is no distension.      Palpations: Abdomen is soft.      Tenderness: There is no abdominal tenderness.   Musculoskeletal:      Cervical back: Neck supple.   Skin:     General: Skin is warm and dry.   Neurological:      General: No focal deficit present.      Mental Status: He is alert and oriented to " person, place, and time.   Psychiatric:         Mood and Affect: Mood normal.         Behavior: Behavior normal.         Assessment / Plan     Assessment/Plan:   Problem List Items Addressed This Visit       Essential hypertension    Relevant Medications    chlorthalidone (HYGROTON) 25 MG tablet    torsemide (DEMADEX) 10 MG tablet    lisinopril (PRINIVIL,ZESTRIL) 40 MG tablet    amLODIPine (NORVASC) 10 MG tablet    carvedilol (COREG) 25 MG tablet    Chronic bilateral low back pain with left-sided sciatica    Relevant Medications    traMADol (ULTRAM) 50 MG tablet    gabapentin (NEURONTIN) 600 MG tablet    Class 3 severe obesity due to excess calories with serious comorbidity and body mass index (BMI) of 40.0 to 44.9 in adult    Lupus    Relevant Medications    predniSONE (DELTASONE) 10 MG tablet    Enbrel Mini 50 MG/ML solution cartridge    CKD (chronic kidney disease) stage 2, GFR 60-89 ml/min    Relevant Medications    chlorthalidone (HYGROTON) 25 MG tablet    torsemide (DEMADEX) 10 MG tablet    Mild episode of recurrent major depressive disorder    Relevant Medications    escitalopram (LEXAPRO) 20 MG tablet    busPIRone (BUSPAR) 5 MG tablet    Rheumatoid arthritis involving multiple sites    Relevant Medications    predniSONE (DELTASONE) 10 MG tablet    Enbrel Mini 50 MG/ML solution cartridge     Other Visit Diagnoses       Class 2 severe obesity due to excess calories with serious comorbidity and body mass index (BMI) of 36.0 to 36.9 in adult    -  Primary    Mixed simple and mucopurulent chronic bronchitis                -- BP stable  -- mood stable  -- continue f/u with speclialist  --The current medical regimen is effective;  continue present plan and medications.  -- continue weight loss  Controlled Substance  Prescription  As part of this patient's treatment plan I am prescribing controlled substances. The patient has been made aware of appropriate use of such medications, including potential risk of  somnolence, limited ability to drive and /or work safely, and potential for dependence or overdose. It has also been made clear that these medications are for use by this patient only, without concomitant use of alcohol or other substances unless prescribed. Andrews reviewed and appropriate     Discussed plan of care in detail with pt today; pt verb understanding and agrees.    Follow up:  3 months    Electronically signed by AMY Escamilla   05/24/2024 09:12 EDT      Please note that portions of this note were completed with a voice recognition program.

## 2024-05-26 DIAGNOSIS — K21.9 GASTROESOPHAGEAL REFLUX DISEASE WITHOUT ESOPHAGITIS: ICD-10-CM

## 2024-05-28 RX ORDER — PANTOPRAZOLE SODIUM 40 MG/1
40 TABLET, DELAYED RELEASE ORAL DAILY
Qty: 30 TABLET | Refills: 0 | Status: SHIPPED | OUTPATIENT
Start: 2024-05-28

## 2024-05-30 ENCOUNTER — TELEPHONE (OUTPATIENT)
Dept: FAMILY MEDICINE CLINIC | Facility: CLINIC | Age: 54
End: 2024-05-30
Payer: COMMERCIAL

## 2024-05-30 NOTE — TELEPHONE ENCOUNTER
PATIENT IS REQUESTING A DIFFERENT ANXIETY MEDICATION. HIS EX WIFE HAS TOOK HIS DAUGHTER OUT OF THE STATE WITHOUT PERMISSION AND HE IS VERY IRRITATED AND MAD/. HE IS ALSO WANTING TO CHECK TO SEE IF OZEMPIC HAS BEEN APPROVED.

## 2024-06-04 DIAGNOSIS — F41.8 SITUATIONAL ANXIETY: ICD-10-CM

## 2024-06-04 RX ORDER — BUSPIRONE HYDROCHLORIDE 5 MG/1
TABLET ORAL
Qty: 90 TABLET | Refills: 0 | Status: SHIPPED | OUTPATIENT
Start: 2024-06-04

## 2024-06-06 DIAGNOSIS — F41.8 SITUATIONAL ANXIETY: Primary | ICD-10-CM

## 2024-06-06 RX ORDER — HYDROXYZINE HYDROCHLORIDE 10 MG/1
10 TABLET, FILM COATED ORAL 3 TIMES DAILY PRN
Qty: 30 TABLET | Refills: 2 | Status: SHIPPED | OUTPATIENT
Start: 2024-06-06

## 2024-06-10 ENCOUNTER — OFFICE VISIT (OUTPATIENT)
Dept: PULMONOLOGY | Facility: CLINIC | Age: 54
End: 2024-06-10
Payer: COMMERCIAL

## 2024-06-10 VITALS
DIASTOLIC BLOOD PRESSURE: 84 MMHG | SYSTOLIC BLOOD PRESSURE: 132 MMHG | WEIGHT: 277 LBS | HEIGHT: 72 IN | BODY MASS INDEX: 37.52 KG/M2 | OXYGEN SATURATION: 96 % | HEART RATE: 64 BPM | RESPIRATION RATE: 18 BRPM

## 2024-06-10 DIAGNOSIS — F17.210 NICOTINE DEPENDENCE, CIGARETTES, UNCOMPLICATED: Primary | ICD-10-CM

## 2024-06-10 DIAGNOSIS — G47.19 EXCESSIVE DAYTIME SLEEPINESS: ICD-10-CM

## 2024-06-10 DIAGNOSIS — J30.89 OTHER ALLERGIC RHINITIS: ICD-10-CM

## 2024-06-10 DIAGNOSIS — G47.33 OBSTRUCTIVE SLEEP APNEA: ICD-10-CM

## 2024-06-10 DIAGNOSIS — J44.9 CHRONIC OBSTRUCTIVE PULMONARY DISEASE, UNSPECIFIED COPD TYPE: ICD-10-CM

## 2024-06-10 DIAGNOSIS — R06.83 SNORING: ICD-10-CM

## 2024-06-10 DIAGNOSIS — J41.0 CHRONIC BRONCHITIS, SIMPLE: ICD-10-CM

## 2024-06-10 DIAGNOSIS — E66.9 OBESITY (BMI 30-39.9): ICD-10-CM

## 2024-06-10 PROCEDURE — 94729 DIFFUSING CAPACITY: CPT | Performed by: INTERNAL MEDICINE

## 2024-06-10 PROCEDURE — 94726 PLETHYSMOGRAPHY LUNG VOLUMES: CPT | Performed by: INTERNAL MEDICINE

## 2024-06-10 PROCEDURE — 94060 EVALUATION OF WHEEZING: CPT | Performed by: INTERNAL MEDICINE

## 2024-06-10 PROCEDURE — 99214 OFFICE O/P EST MOD 30 MIN: CPT | Performed by: INTERNAL MEDICINE

## 2024-06-10 RX ORDER — AZELASTINE 1 MG/ML
1 SPRAY, METERED NASAL 2 TIMES DAILY PRN
Qty: 1 EACH | Refills: 5 | Status: SHIPPED | OUTPATIENT
Start: 2024-06-10

## 2024-06-10 RX ORDER — UMECLIDINIUM BROMIDE AND VILANTEROL TRIFENATATE 62.5; 25 UG/1; UG/1
1 POWDER RESPIRATORY (INHALATION)
Qty: 60 EACH | Refills: 5 | Status: SHIPPED | OUTPATIENT
Start: 2024-06-10

## 2024-06-10 RX ORDER — ALBUTEROL SULFATE 90 UG/1
2 AEROSOL, METERED RESPIRATORY (INHALATION) EVERY 4 HOURS PRN
Qty: 18 G | Refills: 5 | Status: SHIPPED | OUTPATIENT
Start: 2024-06-10

## 2024-06-10 RX ORDER — CARVEDILOL 25 MG/1
25 TABLET ORAL 2 TIMES DAILY WITH MEALS
Qty: 60 TABLET | Refills: 0 | Status: SHIPPED | OUTPATIENT
Start: 2024-06-10

## 2024-06-10 NOTE — PROGRESS NOTES
"  Chief Complaint   Patient presents with    Breathing Problem    Follow-up       Subjective   Dharmesh Samuel is a 53 y.o. male.   Patient was evaluated today for follow up of shortness of breath, MADELAINE and COPD.     Patient says that his symptoms have been stable since the last clinic visit. he reports no recent exacerbations.      Patient is using Anoro, as prescribed. Exercise tolerance has also remained stable but limited due to use of cane.     Continues to smoke 1 pack per day.    Patient also complains of runny nose and dribbling in the back of the throat for the past few weeks. This has been sometimes associated with seasonal variation.    Patient was also evaluated today for follow up of Sleep apnea. Patient does not report some initial improvement but feels that the PAP device has not been relieving most of the symptoms.    Patient reports significant worsening in daytime sleepiness when compared to before.     Patient is also having occasional leaks with the mask which makes it hard for him to use the mask on a consistent basis.     Patient says that the compliance with the use of the equipment is not good.     The following portions of the patient's history were reviewed and updated as appropriate: allergies, current medications, past family history, past medical history, past social history, and past surgical history.    Review of Systems   HENT:  Negative for sinus pressure, sneezing and sore throat.    Respiratory:  Negative for cough, chest tightness, shortness of breath and wheezing.    Psychiatric/Behavioral:  Negative for sleep disturbance.        Objective   Visit Vitals  /84   Pulse 64   Resp 18   Ht 182.9 cm (72\") Comment: pt reported   Wt 126 kg (277 lb)   SpO2 96%   BMI 37.57 kg/m²         BMI Readings from Last 3 Encounters:   06/10/24 37.57 kg/m²   05/24/24 36.21 kg/m²   04/05/24 37.38 kg/m²       Physical Exam  Vitals reviewed.   Constitutional:       Appearance: He is well-developed. "   HENT:      Head: Normocephalic and atraumatic.      Mouth/Throat:      Comments: Oropharynx was crowded.  Eyes:      Extraocular Movements: Extraocular movements intact.   Cardiovascular:      Rate and Rhythm: Normal rate.   Pulmonary:      Comments: Somewhat hyperresonant to percussion.  Somewhat decreased air entry.  No obvious wheezing noted.   Musculoskeletal:      Cervical back: Neck supple.   Neurological:      Mental Status: He is alert and oriented to person, place, and time.           Assessment & Plan   Diagnoses and all orders for this visit:    1. Nicotine dependence, cigarettes, uncomplicated (Primary)  -      CT Chest Low Dose Cancer Screening WO; Future    2. Other allergic rhinitis    3. Obstructive sleep apnea  -     Polysomnography 4 or More Parameters With CPAP; Future    4. Snoring  -     Polysomnography 4 or More Parameters With CPAP; Future    5. Obesity (BMI 30-39.9)  -     Polysomnography 4 or More Parameters With CPAP; Future    6. Excessive daytime sleepiness  -     Polysomnography 4 or More Parameters With CPAP; Future    7. Chronic obstructive pulmonary disease, unspecified COPD type  -     Umeclidinium-Vilanterol (Anoro Ellipta) 62.5-25 MCG/ACT aerosol powder  inhaler; Inhale 1 puff Daily.  Dispense: 60 each; Refill: 5    8. Chronic bronchitis, simple    Other orders  -     albuterol sulfate HFA (Ventolin HFA) 108 (90 Base) MCG/ACT inhaler; Inhale 2 puffs Every 4 (Four) Hours As Needed for Wheezing or Shortness of Air.  Dispense: 18 g; Refill: 5  -     azelastine (ASTELIN) 0.1 % nasal spray; 1 spray into the nostril(s) as directed by provider 2 (Two) Times a Day As Needed for Rhinitis or Allergies. Use in each nostril as directed  Dispense: 1 each; Refill: 5           Return in about 4 months (around 10/10/2024) for LDCT, Sleep study, For [place] ([place]), ...Also 10 mths w/Kay, In [place].    DISCUSSION (if any):  Last chest x-ray was reviewed personally and the results were  shared with the patient.  Images reviewed personally.   Results for orders placed during the hospital encounter of 11/07/23    XR Chest 1 View    Narrative  FINAL REPORT    TECHNIQUE:  null    CLINICAL HISTORY:  soa    COMPARISON:  null    FINDINGS:  Single view of the chest    Comparison:    None    Findings: Low lung volumes. Otherwise normal heart, lungs and mediastinum. No evidence for pneumonia.    Impression  Impression:    Low lung volumes.    Authenticated and Electronically Signed by Chace Perdomo MD on  11/08/2023 01:12:39 AM     also reviewed his last echocardiogram and shared the results with him.   Results for orders placed during the hospital encounter of 11/07/23    Adult Transthoracic Echo Complete With Contrast if Necessary Per Protocol    Interpretation Summary    Left ventricular systolic function is hyperdynamic (EF > 70%). Estimated left ventricular EF = 77% Left ventricular ejection fraction appears to be greater than 70%.    Left ventricular diastolic function was normal.      Results for orders placed during the hospital encounter of 09/07/22    Adult Transthoracic Echo Complete W/ Cont if Necessary Per Protocol    Interpretation Summary  1.  Normal left ventricular size and systolic function, LVEF 60-65%.  2.  Mild concentric LVH.  3.  Normal LV diastolic filling pattern.  4.  Normal right ventricular size and systolic function.  5.  Normal left and right atrial size.  6.  No significant valvular abnormalities.      Laboratory workup also showed   Lab Results   Component Value Date    HGB 13.9 11/07/2023    HGB 14.1 05/10/2023    HGB 14.3 05/02/2023   ,   Lab Results   Component Value Date    HCT 40.5 11/07/2023    HCT 39.6 05/10/2023    HCT 39.9 05/02/2023       Lab Results   Component Value Date    EOSABS 0.36 11/07/2023    EOSABS 0.17 05/10/2023    EOSABS 0.41 (H) 05/02/2023    & Laboratory workup also showed   Lab Results   Component Value Date    CO2 25.6 11/09/2023   ,   Lab Results    Component Value Date    HGBA1C 6.2 04/26/2023    HGBA1C 5.90 (H) 02/16/2022    HGBA1C 5.90 (H) 07/02/2021   ,   Lab Results   Component Value Date    TSH 3.080 11/25/2019     Laboratory workup also showed   Lab Results   Component Value Date    PHART 7.358 11/09/2023    RFP9WHH 45.7 (H) 11/09/2023    PO2ART 66.8 (L) 11/09/2023    K2KPQHTU 94.0 11/09/2023    CARBOXYHGB 1.6 11/09/2023     ===========================  ===========================    PFTs were reviewed.  Consistent with moderate COPD. Performed today.    We have reviewed his pulmonary medications in great detail.    Will try Trelegy, instead of Anoro, to see if that helps with symptoms of Chronic Bronchitis. Sample given.     If it does, we will call it in.    Compliance with medications stressed.     Side effects of prescribed medications discussed with the patient    Patient was strongly encouraged to quit smoking as soon as possible.    The patient belongs to the risk group for which lung cancer screening has been recommended.  This will be indicated soon. This has been ordered.    Patient will be started on nasal spray for symptoms which are definitely consistent with allergic rhinitis.     Sleep study performed in March 2019  AHI was 113 / hour.     Latest PAP device provided in April 2019  DME company: ?    Current PAP settings: ?  Current mask type: FFM    I told the patient that his symptoms are consistent with poorly controlled sleep apnea.    Since the patient is having significant issues, the only realistic option is for him to undergo a full night titration study.    Patient was advised to continue using PAP for at least 4 hours every night.    Patient was advised to call this office with any issues.    Vaccination status addressed.    Up-to-date with influenza vaccinations.     Up-to-date with pneumonia vaccinations.     It was recommended that the patient consider Prevnar-20 vaccination and discuss it with his PCP, if not already  obtained.       Dictated utilizing Dragon dictation.    This document was electronically signed by Ciara Martin MD on 06/10/24 at 11:39 EDT

## 2024-06-20 ENCOUNTER — OFFICE VISIT (OUTPATIENT)
Dept: CARDIOLOGY | Facility: CLINIC | Age: 54
End: 2024-06-20
Payer: COMMERCIAL

## 2024-06-20 VITALS
HEART RATE: 61 BPM | OXYGEN SATURATION: 97 % | HEIGHT: 72 IN | WEIGHT: 275 LBS | BODY MASS INDEX: 37.25 KG/M2 | DIASTOLIC BLOOD PRESSURE: 100 MMHG | SYSTOLIC BLOOD PRESSURE: 130 MMHG

## 2024-06-20 DIAGNOSIS — Z72.0 TOBACCO ABUSE: ICD-10-CM

## 2024-06-20 DIAGNOSIS — N18.2 CKD (CHRONIC KIDNEY DISEASE) STAGE 2, GFR 60-89 ML/MIN: ICD-10-CM

## 2024-06-20 DIAGNOSIS — E66.01 SEVERE OBESITY (BMI 35.0-39.9) WITH COMORBIDITY: ICD-10-CM

## 2024-06-20 DIAGNOSIS — I10 ESSENTIAL HYPERTENSION: Primary | ICD-10-CM

## 2024-06-20 NOTE — PROGRESS NOTES
[facility] Office Follow Up Note    Dharmesh Samuel  [id number]  2024    Primary Care Provider: Michelle Sky APRN    Chief Complaint: Routine follow-up    History of Present Illness:   Mr. Dharmesh Samuel is a 53 y.o. male who presents to the Cardiology Clinic for routine follow-up.  The patient has a past medical history significant for hypertension, as well as obesity with a BMI 37.  He does not have any significant past cardiac history.   Presents today for follow-up of his hypertension.  Since his last appointment, the patient reports a systolic BP has consistently been in the 130s upon follow-up with alternate providers.  He has been tolerating his current cardiac medications without difficulty.  He denies any significant chest pain or exertional chest discomfort.  No significant exertional dyspnea.  No specific complaints today.     Past Cardiac Testin. Last Coronary Angio: None  2. Prior Stress Testing: None  3. Last Echo:               1.  Normal left ventricular size and systolic function, LVEF 60-65%.              2.  Mild concentric LVH.              3.  Normal LV diastolic filling pattern.              4.  Normal right ventricular size and systolic function.              5.  Normal left and right atrial size.              6.  No significant valvular abnormalities.  4. Prior Holter Monitor: None    Review of Systems:   Review of Systems   Constitutional:  Negative for activity change, appetite change, chills, diaphoresis, fatigue, fever, unexpected weight gain and unexpected weight loss.   Eyes:  Negative for blurred vision and double vision.   Respiratory:  Negative for cough, chest tightness, shortness of breath and wheezing.    Cardiovascular:  Negative for chest pain, palpitations and leg swelling.   Gastrointestinal:  Negative for abdominal pain, anal bleeding, blood in stool and GERD.   Endocrine: Negative for cold intolerance and heat intolerance.    Genitourinary:  Negative for hematuria.   Neurological:  Negative for dizziness, syncope, weakness and light-headedness.   Hematological:  Does not bruise/bleed easily.   Psychiatric/Behavioral:  Negative for depressed mood and stress. The patient is not nervous/anxious.        I have reviewed and/or updated the patient's past medical, past surgical, family, social history, problem list and allergies as appropriate.     Medications:     Current Outpatient Medications:     albuterol sulfate HFA (Ventolin HFA) 108 (90 Base) MCG/ACT inhaler, Inhale 2 puffs Every 4 (Four) Hours As Needed for Wheezing or Shortness of Air., Disp: 18 g, Rfl: 5    amLODIPine (NORVASC) 10 MG tablet, Take 1 tablet by mouth Daily., Disp: 90 tablet, Rfl: 1    azelastine (ASTELIN) 0.1 % nasal spray, 1 spray into the nostril(s) as directed by provider 2 (Two) Times a Day As Needed for Rhinitis or Allergies. Use in each nostril as directed, Disp: 1 each, Rfl: 5    busPIRone (BUSPAR) 5 MG tablet, TAKE 1 TABLET BY MOUTH TWICE DAILY MAY  INCREASE  TO  2  TWICE  DAILY  AFTER  5  DAYS  IF  NEEDED., Disp: 90 tablet, Rfl: 0    carvedilol (COREG) 25 MG tablet, TAKE 1 TABLET BY MOUTH TWICE DAILY WITH MEALS, Disp: 60 tablet, Rfl: 0    chlorthalidone (HYGROTON) 25 MG tablet, Take 1 tablet by mouth Daily., Disp: , Rfl:     cycloSPORINE (RESTASIS) 0.05 % ophthalmic emulsion, Administer 1 drop to both eyes 2 (Two) Times a Day., Disp: 10 each, Rfl: 5    Enbrel Mini 50 MG/ML solution cartridge, , Disp: , Rfl:     escitalopram (LEXAPRO) 20 MG tablet, Take 1 tablet by mouth Every Morning., Disp: 90 tablet, Rfl: 0    folic acid (FOLVITE) 1 MG tablet, Take 1 tablet by mouth Daily., Disp: 90 tablet, Rfl: 3    gabapentin (NEURONTIN) 600 MG tablet, Take 1 tablet by mouth 3 (Three) Times a Day., Disp: 90 tablet, Rfl: 1    hydrOXYzine (ATARAX) 10 MG tablet, Take 1 tablet by mouth 3 (Three) Times a Day As Needed for Anxiety., Disp: 30 tablet, Rfl: 2    lisinopril  "(PRINIVIL,ZESTRIL) 40 MG tablet, Take 1 tablet by mouth Daily., Disp: 90 tablet, Rfl: 1    methotrexate 2.5 MG tablet, Take 6 tablets by mouth 1 (One) Time Per Week., Disp: , Rfl:     pantoprazole (PROTONIX) 40 MG EC tablet, Take 1 tablet by mouth once daily, Disp: 30 tablet, Rfl: 0    torsemide (DEMADEX) 10 MG tablet, Take 1 tablet by mouth Daily., Disp: 30 tablet, Rfl: 2    traMADol (ULTRAM) 50 MG tablet, Take 1 tablet by mouth Every 12 (Twelve) Hours As Needed for Moderate Pain., Disp: 60 tablet, Rfl: 2    Physical Exam:  Vital Signs:   Vitals:    06/20/24 1013   BP: 130/100   Pulse: 61   SpO2: 97%   Weight: 125 kg (275 lb)   Height: 182.9 cm (72\")       Physical Exam  Constitutional:       General: He is not in acute distress.     Appearance: He is obese. He is not diaphoretic.   HENT:      Head: Normocephalic and atraumatic.   Cardiovascular:      Rate and Rhythm: Normal rate and regular rhythm.      Heart sounds: No murmur heard.  Pulmonary:      Effort: Pulmonary effort is normal. No respiratory distress.      Breath sounds: Normal breath sounds. No stridor. No wheezing, rhonchi or rales.   Abdominal:      General: Bowel sounds are normal. There is no distension.      Palpations: Abdomen is soft.      Tenderness: There is no abdominal tenderness. There is no guarding or rebound.   Musculoskeletal:         General: No swelling. Normal range of motion.      Cervical back: Neck supple. No tenderness.   Skin:     General: Skin is warm and dry.   Neurological:      General: No focal deficit present.      Mental Status: He is alert and oriented to person, place, and time.   Psychiatric:         Mood and Affect: Mood normal.         Behavior: Behavior normal.         Results Review:   I reviewed the patient's new clinical results.        Assessment / Plan:     1. Primary hypertension  -- BP remains adequately controlled  -- Continue Norvasc, carvedilol, chlorthalidone, lisinopril  --Low-sodium diet  --Follow-up in 1 " year     2.  Stage II CKD  -- Creatinine stable on last check  --Followed by nephrology     3.  Morbid obesity with BMI of 37  -- Encouraged continued efforts at weight loss    4.  Chronic tobacco use  -- Complete cessation advised    Follow Up:   Return in about 1 year (around 6/20/2025).      Thank you for allowing me to participate in the care of your patient. Please to not hesitate to contact me with additional questions or concerns.     NEFTALI Mcgregor MD  Interventional Cardiology   06/20/2024  10:27 EDT

## 2024-06-20 NOTE — PROGRESS NOTES
[facility] Cardiology    Office Consult     Dharmesh Samuel  [id number]  06/20/2024    Referred By: No ref. provider found    Chief Complaint   Patient presents with    Follow-up    Hypertension       Subjective     History of Present Illness:   Dharmesh Samuel is a 53 y.o. male who presents to the Cardiology Clinic for regular follow up of hypertension.      Past Cardiac Testing:       Review of Systems     Past Medical History:   Diagnosis Date    Arthritis     Bulging lumbar disc     Depression 2017    Hypertension     Impaired mobility     Low back pain     Obesity 2020    Pinched nerve     Sleep apnea     Visual impairment 2020       History reviewed. No pertinent surgical history.    Family History   Problem Relation Age of Onset    Hypertension Father     Sleep apnea Paternal Uncle     Alcohol abuse Maternal Grandfather     Arthritis Maternal Grandmother     Arthritis Paternal Grandmother        Social History     Socioeconomic History    Marital status:    Tobacco Use    Smoking status: Every Day     Current packs/day: 1.00     Average packs/day: 1 pack/day for 15.0 years (15.0 ttl pk-yrs)     Types: Cigarettes     Passive exposure: Current    Smokeless tobacco: Former     Types: Snuff   Vaping Use    Vaping status: Never Used   Substance and Sexual Activity    Alcohol use: Yes     Comment: 4 drinks a day    Drug use: Not Currently     Types: Marijuana    Sexual activity: Yes     Partners: Female     Birth control/protection: None         Current Outpatient Medications:     albuterol sulfate HFA (Ventolin HFA) 108 (90 Base) MCG/ACT inhaler, Inhale 2 puffs Every 4 (Four) Hours As Needed for Wheezing or Shortness of Air., Disp: 18 g, Rfl: 5    amLODIPine (NORVASC) 10 MG tablet, Take 1 tablet by mouth Daily., Disp: 90 tablet, Rfl: 1    azelastine (ASTELIN) 0.1 % nasal spray, 1 spray into the nostril(s) as directed by provider 2 (Two) Times a Day As Needed for Rhinitis or Allergies. Use in each  "nostril as directed, Disp: 1 each, Rfl: 5    busPIRone (BUSPAR) 5 MG tablet, TAKE 1 TABLET BY MOUTH TWICE DAILY MAY  INCREASE  TO  2  TWICE  DAILY  AFTER  5  DAYS  IF  NEEDED., Disp: 90 tablet, Rfl: 0    carvedilol (COREG) 25 MG tablet, TAKE 1 TABLET BY MOUTH TWICE DAILY WITH MEALS, Disp: 60 tablet, Rfl: 0    chlorthalidone (HYGROTON) 25 MG tablet, Take 1 tablet by mouth Daily., Disp: , Rfl:     cycloSPORINE (RESTASIS) 0.05 % ophthalmic emulsion, Administer 1 drop to both eyes 2 (Two) Times a Day., Disp: 10 each, Rfl: 5    Enbrel Mini 50 MG/ML solution cartridge, , Disp: , Rfl:     escitalopram (LEXAPRO) 20 MG tablet, Take 1 tablet by mouth Every Morning., Disp: 90 tablet, Rfl: 0    folic acid (FOLVITE) 1 MG tablet, Take 1 tablet by mouth Daily., Disp: 90 tablet, Rfl: 3    gabapentin (NEURONTIN) 600 MG tablet, Take 1 tablet by mouth 3 (Three) Times a Day., Disp: 90 tablet, Rfl: 1    hydrOXYzine (ATARAX) 10 MG tablet, Take 1 tablet by mouth 3 (Three) Times a Day As Needed for Anxiety., Disp: 30 tablet, Rfl: 2    lisinopril (PRINIVIL,ZESTRIL) 40 MG tablet, Take 1 tablet by mouth Daily., Disp: 90 tablet, Rfl: 1    methotrexate 2.5 MG tablet, Take 6 tablets by mouth 1 (One) Time Per Week., Disp: , Rfl:     pantoprazole (PROTONIX) 40 MG EC tablet, Take 1 tablet by mouth once daily, Disp: 30 tablet, Rfl: 0    torsemide (DEMADEX) 10 MG tablet, Take 1 tablet by mouth Daily., Disp: 30 tablet, Rfl: 2    traMADol (ULTRAM) 50 MG tablet, Take 1 tablet by mouth Every 12 (Twelve) Hours As Needed for Moderate Pain., Disp: 60 tablet, Rfl: 2    No Known Allergies    Objective     Vitals:    06/20/24 1013   BP: 130/100   Pulse: 61   SpO2: 97%   Weight: 125 kg (275 lb)   Height: 182.9 cm (72\")     Body mass index is 37.3 kg/m².    Physical Exam    Results Review:   I reviewed the patient's new clinical results.  I reviewed the patient's other test results and agree with the interpretation    Procedures    Assessment & Plan  Essential " hypertension    CKD (chronic kidney disease) stage 2, GFR 60-89 ml/min    Severe obesity (BMI 35.0-39.9) with comorbidity    Tobacco abuse              Preventative Cardiology:   Tobacco Cessation: Cessation Counseling Provided      Follow Up:   Return in about 1 year (around 6/20/2025).      Thank you for allowing me to participate in the care of your patient. Please to not hesitate to contact me with additional questions or concerns.     Maylin Pham, APRN

## 2024-06-23 DIAGNOSIS — K21.9 GASTROESOPHAGEAL REFLUX DISEASE WITHOUT ESOPHAGITIS: ICD-10-CM

## 2024-06-24 RX ORDER — PANTOPRAZOLE SODIUM 40 MG/1
40 TABLET, DELAYED RELEASE ORAL DAILY
Qty: 30 TABLET | Refills: 0 | Status: SHIPPED | OUTPATIENT
Start: 2024-06-24

## 2024-07-09 RX ORDER — CARVEDILOL 25 MG/1
25 TABLET ORAL 2 TIMES DAILY WITH MEALS
Qty: 60 TABLET | Refills: 0 | Status: SHIPPED | OUTPATIENT
Start: 2024-07-09

## 2024-08-08 RX ORDER — CARVEDILOL 25 MG/1
25 TABLET ORAL 2 TIMES DAILY WITH MEALS
Qty: 60 TABLET | Refills: 0 | Status: SHIPPED | OUTPATIENT
Start: 2024-08-08

## 2024-08-23 ENCOUNTER — TELEPHONE (OUTPATIENT)
Dept: PULMONOLOGY | Facility: CLINIC | Age: 54
End: 2024-08-23
Payer: COMMERCIAL

## 2024-08-23 NOTE — TELEPHONE ENCOUNTER
Patient canceled ct scan on 6/14/24. A letter was mailed on 7/31 and the referral is now closed. Canceling order.

## 2024-08-30 DIAGNOSIS — F33.0 MILD EPISODE OF RECURRENT MAJOR DEPRESSIVE DISORDER: ICD-10-CM

## 2024-09-04 DIAGNOSIS — H04.123 DRY EYES: ICD-10-CM

## 2024-09-04 RX ORDER — ESCITALOPRAM OXALATE 20 MG/1
20 TABLET ORAL EVERY MORNING
Qty: 90 TABLET | Refills: 0 | Status: SHIPPED | OUTPATIENT
Start: 2024-09-04

## 2024-09-04 RX ORDER — CYCLOSPORINE 0.5 MG/ML
EMULSION OPHTHALMIC
Qty: 180 EACH | Refills: 0 | Status: SHIPPED | OUTPATIENT
Start: 2024-09-04

## 2024-09-23 ENCOUNTER — OFFICE VISIT (OUTPATIENT)
Dept: FAMILY MEDICINE CLINIC | Facility: CLINIC | Age: 54
End: 2024-09-23
Payer: COMMERCIAL

## 2024-09-23 VITALS
HEIGHT: 72 IN | BODY MASS INDEX: 38.06 KG/M2 | OXYGEN SATURATION: 95 % | HEART RATE: 62 BPM | WEIGHT: 281 LBS | SYSTOLIC BLOOD PRESSURE: 125 MMHG | DIASTOLIC BLOOD PRESSURE: 90 MMHG

## 2024-09-23 DIAGNOSIS — Z23 NEED FOR INFLUENZA VACCINATION: ICD-10-CM

## 2024-09-23 DIAGNOSIS — I10 ESSENTIAL HYPERTENSION: Primary | ICD-10-CM

## 2024-09-23 DIAGNOSIS — R73.03 PREDIABETES: ICD-10-CM

## 2024-09-23 DIAGNOSIS — IMO0002 LUPUS: ICD-10-CM

## 2024-09-23 DIAGNOSIS — G89.29 CHRONIC BILATERAL LOW BACK PAIN WITH LEFT-SIDED SCIATICA: ICD-10-CM

## 2024-09-23 DIAGNOSIS — M06.9 RHEUMATOID ARTHRITIS INVOLVING MULTIPLE SITES, UNSPECIFIED WHETHER RHEUMATOID FACTOR PRESENT: ICD-10-CM

## 2024-09-23 DIAGNOSIS — N18.2 CKD (CHRONIC KIDNEY DISEASE) STAGE 2, GFR 60-89 ML/MIN: ICD-10-CM

## 2024-09-23 DIAGNOSIS — E66.01 SEVERE OBESITY (BMI 35.0-39.9) WITH COMORBIDITY: ICD-10-CM

## 2024-09-23 DIAGNOSIS — Z12.11 SCREEN FOR COLON CANCER: ICD-10-CM

## 2024-09-23 DIAGNOSIS — M54.42 CHRONIC BILATERAL LOW BACK PAIN WITH LEFT-SIDED SCIATICA: ICD-10-CM

## 2024-09-23 DIAGNOSIS — F33.0 MILD EPISODE OF RECURRENT MAJOR DEPRESSIVE DISORDER: ICD-10-CM

## 2024-09-23 LAB
EXPIRATION DATE: NORMAL
HBA1C MFR BLD: 5.7 % (ref 4.5–5.7)
Lab: NORMAL

## 2024-09-23 PROCEDURE — 83036 HEMOGLOBIN GLYCOSYLATED A1C: CPT | Performed by: NURSE PRACTITIONER

## 2024-09-23 PROCEDURE — 1125F AMNT PAIN NOTED PAIN PRSNT: CPT | Performed by: NURSE PRACTITIONER

## 2024-09-23 PROCEDURE — 3044F HG A1C LEVEL LT 7.0%: CPT | Performed by: NURSE PRACTITIONER

## 2024-09-23 PROCEDURE — 90656 IIV3 VACC NO PRSV 0.5 ML IM: CPT | Performed by: NURSE PRACTITIONER

## 2024-09-23 PROCEDURE — 3080F DIAST BP >= 90 MM HG: CPT | Performed by: NURSE PRACTITIONER

## 2024-09-23 PROCEDURE — 1160F RVW MEDS BY RX/DR IN RCRD: CPT | Performed by: NURSE PRACTITIONER

## 2024-09-23 PROCEDURE — 90471 IMMUNIZATION ADMIN: CPT | Performed by: NURSE PRACTITIONER

## 2024-09-23 PROCEDURE — 3074F SYST BP LT 130 MM HG: CPT | Performed by: NURSE PRACTITIONER

## 2024-09-23 PROCEDURE — 99214 OFFICE O/P EST MOD 30 MIN: CPT | Performed by: NURSE PRACTITIONER

## 2024-09-23 PROCEDURE — 1159F MED LIST DOCD IN RCRD: CPT | Performed by: NURSE PRACTITIONER

## 2024-09-23 RX ORDER — SEMAGLUTIDE 0.25 MG/.5ML
0.25 INJECTION, SOLUTION SUBCUTANEOUS WEEKLY
Qty: 2 ML | Refills: 0 | Status: SHIPPED | OUTPATIENT
Start: 2024-09-23

## 2024-09-23 RX ORDER — ALBUTEROL SULFATE 90 UG/1
2 INHALANT RESPIRATORY (INHALATION) EVERY 4 HOURS PRN
Qty: 18 G | Refills: 5 | Status: SHIPPED | OUTPATIENT
Start: 2024-09-23

## 2024-09-23 RX ORDER — UMECLIDINIUM BROMIDE AND VILANTEROL TRIFENATATE 62.5; 25 UG/1; UG/1
1 POWDER RESPIRATORY (INHALATION) DAILY
COMMUNITY
Start: 2024-08-31

## 2024-10-07 DIAGNOSIS — G89.29 CHRONIC BILATERAL LOW BACK PAIN WITH LEFT-SIDED SCIATICA: ICD-10-CM

## 2024-10-07 DIAGNOSIS — M54.42 CHRONIC BILATERAL LOW BACK PAIN WITH LEFT-SIDED SCIATICA: ICD-10-CM

## 2024-10-07 RX ORDER — TRAMADOL HYDROCHLORIDE 50 MG/1
50 TABLET ORAL EVERY 12 HOURS PRN
Qty: 14 TABLET | Refills: 0 | OUTPATIENT
Start: 2024-10-07

## 2024-10-09 ENCOUNTER — PRIOR AUTHORIZATION (OUTPATIENT)
Dept: FAMILY MEDICINE CLINIC | Facility: CLINIC | Age: 54
End: 2024-10-09
Payer: COMMERCIAL

## 2024-10-09 DIAGNOSIS — M06.9 RHEUMATOID ARTHRITIS INVOLVING MULTIPLE SITES, UNSPECIFIED WHETHER RHEUMATOID FACTOR PRESENT: Primary | ICD-10-CM

## 2024-10-09 NOTE — TELEPHONE ENCOUNTER
PRIOR AUTH FORM HAS BEEN FAXED TO [email or web address] FOR WE[employer]OV[employer].    WAITING ON A RESPONSE FROM THE INSURANCE.

## 2024-10-14 ENCOUNTER — TELEPHONE (OUTPATIENT)
Dept: FAMILY MEDICINE CLINIC | Facility: CLINIC | Age: 54
End: 2024-10-14
Payer: COMMERCIAL

## 2024-10-14 ENCOUNTER — OFFICE VISIT (OUTPATIENT)
Dept: PULMONOLOGY | Facility: CLINIC | Age: 54
End: 2024-10-14
Payer: COMMERCIAL

## 2024-10-14 VITALS
OXYGEN SATURATION: 99 % | BODY MASS INDEX: 37.25 KG/M2 | WEIGHT: 275 LBS | HEART RATE: 96 BPM | SYSTOLIC BLOOD PRESSURE: 128 MMHG | HEIGHT: 72 IN | DIASTOLIC BLOOD PRESSURE: 72 MMHG

## 2024-10-14 DIAGNOSIS — Z87.891 PERSONAL HISTORY OF NICOTINE DEPENDENCE: ICD-10-CM

## 2024-10-14 DIAGNOSIS — Z12.2 ENCOUNTER FOR SCREENING FOR LUNG CANCER: ICD-10-CM

## 2024-10-14 DIAGNOSIS — G47.33 OBSTRUCTIVE SLEEP APNEA: ICD-10-CM

## 2024-10-14 DIAGNOSIS — J44.9 CHRONIC OBSTRUCTIVE PULMONARY DISEASE, UNSPECIFIED COPD TYPE: Primary | ICD-10-CM

## 2024-10-14 NOTE — PROGRESS NOTES
Follow Up Office Visit      Patient Name: Dharmesh Samuel    Chief Complaint:    Chief Complaint   Patient presents with    Breathing Problem       History of Present Illness: Dharmesh Samuel is a 53 y.o. male who is here today for follow up of COPD and MADELAINE.  Since last visit, he notes that he missed his sleep study appointment.  He does have severe sleep apnea, though notes that he has not been using PAP as he left his machine at his prior home when he sold his house.  While using PAP previously, he notes that he was not feeling rested on it.  He does inquire about the inspire procedure, though after further discussion says that he would not consider undergoing inspire at this time. He reports that his dyspnea has been overall stable.  He did try Trelegy sample after his last visit and notes that it seemed to work a lot better for him than Anoro.  He is continued on use of Anoro and does require short acting beta agonist use daily.  He has not had any exacerbations since last visit.  He is still smoking, though has cut back.    Supplemental Oxygen: No    Subjective      Review of Systems:  Review of Systems   Constitutional:  Positive for fatigue. Negative for fever and unexpected weight change.   HENT:  Positive for rhinorrhea.    Respiratory:  Positive for shortness of breath. Negative for cough and wheezing.    Cardiovascular:  Negative for chest pain and leg swelling.        Past Medical History:   Past Medical History:   Diagnosis Date    Arthritis     Bulging lumbar disc     Depression 2017    Hypertension     Impaired mobility     Low back pain     Obesity 2020    Pinched nerve     Sleep apnea     Visual impairment 2020       Past Surgical History: History reviewed. No pertinent surgical history.    Family History:   Family History   Problem Relation Age of Onset    Hypertension Father     Sleep apnea Paternal Uncle     Alcohol abuse Maternal Grandfather     Arthritis Maternal Grandmother     Arthritis Paternal  Grandmother        Social History:   Social History     Socioeconomic History    Marital status:    Tobacco Use    Smoking status: Every Day     Current packs/day: 1.00     Average packs/day: 1 pack/day for 15.0 years (15.0 ttl pk-yrs)     Types: Cigarettes     Passive exposure: Current    Smokeless tobacco: Former     Types: Snuff   Vaping Use    Vaping status: Never Used   Substance and Sexual Activity    Alcohol use: Yes     Comment: 4 drinks a day    Drug use: Not Currently     Types: Marijuana    Sexual activity: Yes     Partners: Female     Birth control/protection: None       Current Medications:     Current Outpatient Medications:     albuterol sulfate HFA (Ventolin HFA) 108 (90 Base) MCG/ACT inhaler, Inhale 2 puffs Every 4 (Four) Hours As Needed for Wheezing or Shortness of Air., Disp: 18 g, Rfl: 5    amLODIPine (NORVASC) 10 MG tablet, Take 1 tablet by mouth Daily., Disp: 90 tablet, Rfl: 1    Anoro Ellipta 62.5-25 MCG/ACT aerosol powder  inhaler, Inhale 1 puff Daily., Disp: , Rfl:     azelastine (ASTELIN) 0.1 % nasal spray, 1 spray into the nostril(s) as directed by provider 2 (Two) Times a Day As Needed for Rhinitis or Allergies. Use in each nostril as directed, Disp: 1 each, Rfl: 5    busPIRone (BUSPAR) 5 MG tablet, TAKE 1 TABLET BY MOUTH TWICE DAILY MAY  INCREASE  TO  2  TWICE  DAILY  AFTER  5  DAYS  IF  NEEDED., Disp: 90 tablet, Rfl: 0    carvedilol (COREG) 25 MG tablet, TAKE 1 TABLET BY MOUTH TWICE DAILY WITH MEALS, Disp: 60 tablet, Rfl: 0    cycloSPORINE (RESTASIS) 0.05 % ophthalmic emulsion, INSTILL 1 DROP INTO EACH EYE TWICE DAILY, Disp: 180 each, Rfl: 0    escitalopram (LEXAPRO) 20 MG tablet, TAKE 1 TABLET BY MOUTH ONCE DAILY IN THE MORNING, Disp: 90 tablet, Rfl: 0    folic acid (FOLVITE) 1 MG tablet, Take 1 tablet by mouth Daily., Disp: 90 tablet, Rfl: 3    gabapentin (NEURONTIN) 600 MG tablet, Take 1 tablet by mouth 3 (Three) Times a Day., Disp: 90 tablet, Rfl: 1    hydrOXYzine (ATARAX) 10  "MG tablet, Take 1 tablet by mouth 3 (Three) Times a Day As Needed for Anxiety., Disp: 30 tablet, Rfl: 2    lisinopril (PRINIVIL,ZESTRIL) 40 MG tablet, Take 1 tablet by mouth Daily., Disp: 90 tablet, Rfl: 1    methotrexate 2.5 MG tablet, Take 6 tablets by mouth 1 (One) Time Per Week., Disp: , Rfl:     pantoprazole (PROTONIX) 40 MG EC tablet, Take 1 tablet by mouth once daily, Disp: 30 tablet, Rfl: 0    torsemide (DEMADEX) 10 MG tablet, Take 1 tablet by mouth Daily., Disp: 30 tablet, Rfl: 2    traMADol (ULTRAM) 50 MG tablet, Take 1 tablet by mouth Every 12 (Twelve) Hours As Needed for Moderate Pain., Disp: 60 tablet, Rfl: 2    Semaglutide-Weight Management (Wegovy) 0.25 MG/0.5ML solution auto-injector, Inject 0.5 mL under the skin into the appropriate area as directed 1 (One) Time Per Week. (Patient not taking: Reported on 10/14/2024), Disp: 2 mL, Rfl: 0     Allergies:   No Known Allergies    Objective     Physical Exam:  Vital Signs:   Vitals:    10/14/24 1356   BP: 128/72   Pulse: 96   SpO2: 99%   Weight: 125 kg (275 lb)   Height: 182.9 cm (72\")     Body mass index is 37.3 kg/m².    Physical Exam  Vitals reviewed.   Constitutional:       General: He is not in acute distress.     Appearance: He is not toxic-appearing.   HENT:      Head: Normocephalic and atraumatic.      Mouth/Throat:      Mouth: Mucous membranes are moist.   Eyes:      Extraocular Movements: Extraocular movements intact.      Conjunctiva/sclera: Conjunctivae normal.   Cardiovascular:      Rate and Rhythm: Normal rate.      Heart sounds: Normal heart sounds.   Pulmonary:      Effort: Pulmonary effort is normal.      Breath sounds: Normal breath sounds.   Abdominal:      General: There is no distension.      Palpations: Abdomen is soft.   Musculoskeletal:         General: No swelling.      Cervical back: Neck supple.   Skin:     General: Skin is warm and dry.      Findings: No rash.   Neurological:      General: No focal deficit present.      Mental " Status: He is alert and oriented to person, place, and time.   Psychiatric:         Mood and Affect: Mood normal.         Behavior: Behavior normal.       Results Review:   Normal alpha-1 antitrypsin genotype.    Assessment / Plan      Assessment/Plan:   Diagnoses and all orders for this visit:    1. Chronic obstructive pulmonary disease, unspecified COPD type (Primary)  -     Fluticasone-Umeclidin-Vilant (TRELEGY) 100-62.5-25 MCG/ACT inhaler; Inhale 1 puff Daily. Rinse mouth out after use  Dispense: 1 each; Refill: 5  Better clinical response to use of Trelegy and will therefore discontinue Anoro. We discussed the risk and benefits of inhaled corticosteroids. Patient instructed to take them on a regular basis as prescribed. Patient instructed to rinse their mouth out after each use. Patient instructed to contact their insurance company and make sure that the medications prescribed are on their formulary and the lowest cost/tier for them. They will call the clinic back if different medications need to prescribed.     2. Personal history of nicotine dependence  -      CT Chest Low Dose Cancer Screening WO; Future  Complete cessation advised.    3. Encounter for screening for lung cancer  -      CT Chest Low Dose Cancer Screening WO; Future  Patient with greater than 30-pack-year smoking history, and therefore low-dose lung cancer screening is recommended.  Shared decision making counseling including risks and benefits of low-dose lung cancer screening, follow-up diagnostic testing that may be indicated and how comorbid conditions would affect diagnosis/treatment, false positive rates, and total radiation exposure.  Patient wishes to undergo screening, seen as they would be willing to seek diagnosis and treatment if necessitated by results.    4. Obstructive sleep apnea  Severe MADELAINE and has not been using PAP. Cautioned patient against sleepy driving. The patient was counseled on the risks of untreated sleep apnea and  voiced understanding. The clinic scheduler will be asked to reschedule his BiPAP titration study as ordered by Dr. Martin previously.       Follow Up:   April 2025 as previously scheduled  The patient was counseled on diagnostic results, risks and benefits of treatment options, risk factor modifications and the importance of treatment compliance. The patient was advised to contact the clinic with concerns or worsening symptoms.     AMY Kim   Pulmonary Medicine [place]     This document has been electronically signed by AMY Kim  October 14, 2024

## 2024-10-14 NOTE — TELEPHONE ENCOUNTER
Caller: Dharmesh Samuel    Relationship: Self    Best call back number: [phone]    What form or medical record are you requesting: PRIOR AUTHORIZATION     Who is requesting this form or medical record from you: PHARMACY     How would you like to receive the form or medical records (pick-up, mail, fax): FAX [phone]    Timeframe paperwork needed: ASAP    Additional notes: PATIENT IS NEEDING A PRIOR AUTHORIZATION FOR WEGOVY MEDICATION SENT TO PHARMACY.

## 2024-10-16 DIAGNOSIS — G89.29 CHRONIC BILATERAL LOW BACK PAIN WITH LEFT-SIDED SCIATICA: ICD-10-CM

## 2024-10-16 DIAGNOSIS — M54.42 CHRONIC BILATERAL LOW BACK PAIN WITH LEFT-SIDED SCIATICA: ICD-10-CM

## 2024-10-16 NOTE — TELEPHONE ENCOUNTER
Rx Refill Note  Requested Prescriptions     Pending Prescriptions Disp Refills    traMADol (ULTRAM) 50 MG tablet [Pharmacy Med Name: traMADol HCl 50 MG Oral Tablet] 14 tablet 0     Sig: TAKE 1 TABLET BY MOUTH EVERY 12 HOURS AS NEEDED FOR MODERATE PAIN      Last office visit with prescribing clinician: 9/23/2024   Last telemedicine visit with prescribing clinician: Visit date not found   Next office visit with prescribing clinician: 12/23/2024                         Would you like a call back once the refill request has been completed: [] Yes [] No    If the office needs to give you a call back, can they leave a voicemail: [] Yes [] No    Mami Aleman MA  10/16/24, 13:06 EDT

## 2024-10-18 DIAGNOSIS — G89.29 CHRONIC BILATERAL LOW BACK PAIN WITH LEFT-SIDED SCIATICA: ICD-10-CM

## 2024-10-18 DIAGNOSIS — M54.42 CHRONIC BILATERAL LOW BACK PAIN WITH LEFT-SIDED SCIATICA: ICD-10-CM

## 2024-10-18 RX ORDER — TRAMADOL HYDROCHLORIDE 50 MG/1
50 TABLET ORAL EVERY 12 HOURS PRN
Qty: 60 TABLET | Refills: 0 | Status: SHIPPED | OUTPATIENT
Start: 2024-10-18

## 2024-10-21 DIAGNOSIS — M54.42 CHRONIC BILATERAL LOW BACK PAIN WITH LEFT-SIDED SCIATICA: Primary | ICD-10-CM

## 2024-10-21 DIAGNOSIS — G89.29 CHRONIC BILATERAL LOW BACK PAIN WITH LEFT-SIDED SCIATICA: Primary | ICD-10-CM

## 2024-10-22 NOTE — TELEPHONE ENCOUNTER
Rx Refill Note  Requested Prescriptions     Pending Prescriptions Disp Refills    gabapentin (NEURONTIN) 600 MG tablet [Pharmacy Med Name: Gabapentin 600 MG Oral Tablet] 90 tablet 0     Sig: TAKE 1 TABLET BY MOUTH THREE TIMES DAILY      Last office visit with prescribing clinician: 9/23/2024   Last telemedicine visit with prescribing clinician: Visit date not found   Next office visit with prescribing clinician: 12/23/2024                         Would you like a call back once the refill request has been completed: [] Yes [] No    If the office needs to give you a call back, can they leave a voicemail: [] Yes [] No    Zuleika Majano CMA  10/22/24, 10:29 EDT

## 2024-10-23 RX ORDER — TRAMADOL HYDROCHLORIDE 50 MG/1
50 TABLET ORAL EVERY 12 HOURS PRN
Qty: 60 TABLET | Refills: 1 | Status: SHIPPED | OUTPATIENT
Start: 2024-10-23

## 2024-10-23 RX ORDER — GABAPENTIN 600 MG/1
600 TABLET ORAL 3 TIMES DAILY
Qty: 90 TABLET | Refills: 1 | Status: SHIPPED | OUTPATIENT
Start: 2024-10-23

## 2024-11-09 ENCOUNTER — HOSPITAL ENCOUNTER (OUTPATIENT)
Dept: CT IMAGING | Facility: HOSPITAL | Age: 54
Discharge: HOME | End: 2024-11-09
Payer: COMMERCIAL

## 2024-11-09 DIAGNOSIS — Z87.891 PERSONAL HISTORY OF NICOTINE DEPENDENCE: ICD-10-CM

## 2024-11-09 DIAGNOSIS — Z12.2 ENCOUNTER FOR SCREENING FOR LUNG CANCER: ICD-10-CM

## 2024-11-09 PROCEDURE — 71271 CT THORAX LUNG CANCER SCR C-: CPT

## 2024-11-11 DIAGNOSIS — R93.89 ABNORMAL CHEST CT: Primary | ICD-10-CM

## 2024-11-20 ENCOUNTER — PRIOR AUTHORIZATION (OUTPATIENT)
Dept: FAMILY MEDICINE CLINIC | Facility: CLINIC | Age: 54
End: 2024-11-20
Payer: COMMERCIAL

## 2024-11-20 ENCOUNTER — OFFICE VISIT (OUTPATIENT)
Dept: FAMILY MEDICINE CLINIC | Facility: CLINIC | Age: 54
End: 2024-11-20
Payer: COMMERCIAL

## 2024-11-20 ENCOUNTER — TELEPHONE (OUTPATIENT)
Dept: FAMILY MEDICINE CLINIC | Facility: CLINIC | Age: 54
End: 2024-11-20

## 2024-11-20 VITALS
WEIGHT: 276 LBS | HEART RATE: 73 BPM | OXYGEN SATURATION: 98 % | DIASTOLIC BLOOD PRESSURE: 86 MMHG | BODY MASS INDEX: 37.38 KG/M2 | HEIGHT: 72 IN | SYSTOLIC BLOOD PRESSURE: 130 MMHG

## 2024-11-20 DIAGNOSIS — M06.9 RHEUMATOID ARTHRITIS INVOLVING MULTIPLE SITES, UNSPECIFIED WHETHER RHEUMATOID FACTOR PRESENT: ICD-10-CM

## 2024-11-20 DIAGNOSIS — Z51.81 ENCOUNTER FOR THERAPEUTIC DRUG MONITORING: ICD-10-CM

## 2024-11-20 DIAGNOSIS — E66.812 CLASS 2 SEVERE OBESITY DUE TO EXCESS CALORIES WITH SERIOUS COMORBIDITY AND BODY MASS INDEX (BMI) OF 36.0 TO 36.9 IN ADULT: ICD-10-CM

## 2024-11-20 DIAGNOSIS — Z91.89 AT HIGH RISK FOR CORONARY ARTERY DISEASE: ICD-10-CM

## 2024-11-20 DIAGNOSIS — I10 ESSENTIAL HYPERTENSION: ICD-10-CM

## 2024-11-20 DIAGNOSIS — G89.29 CHRONIC PAIN OF LEFT KNEE: ICD-10-CM

## 2024-11-20 DIAGNOSIS — G89.29 CHRONIC BILATERAL LOW BACK PAIN WITH LEFT-SIDED SCIATICA: Primary | ICD-10-CM

## 2024-11-20 DIAGNOSIS — E66.01 CLASS 2 SEVERE OBESITY DUE TO EXCESS CALORIES WITH SERIOUS COMORBIDITY AND BODY MASS INDEX (BMI) OF 36.0 TO 36.9 IN ADULT: ICD-10-CM

## 2024-11-20 DIAGNOSIS — R73.03 PRE-DIABETES: ICD-10-CM

## 2024-11-20 DIAGNOSIS — M19.90 ARTHRITIS: ICD-10-CM

## 2024-11-20 DIAGNOSIS — M25.562 CHRONIC PAIN OF LEFT KNEE: ICD-10-CM

## 2024-11-20 DIAGNOSIS — M54.42 CHRONIC BILATERAL LOW BACK PAIN WITH LEFT-SIDED SCIATICA: Primary | ICD-10-CM

## 2024-11-20 RX ORDER — SEMAGLUTIDE 0.25 MG/.5ML
0.25 INJECTION, SOLUTION SUBCUTANEOUS WEEKLY
Qty: 2 ML | Refills: 0 | Status: SHIPPED | OUTPATIENT
Start: 2024-11-20

## 2024-11-20 NOTE — PROGRESS NOTES
Subjective     Chief Complaint:    Chief Complaint   Patient presents with    Back Pain       History of Present Illness:   Has chronic back pain, is on gabapentin and tramadol, he has been complaint with his treatment. He remains in chronic pain but the medications helps make the pain more tolerable  Has RA and lupus and follows with rheum, on mtx, prednisone prn, Dr Edwards no longer takes his insurance and he has follow up with [facility] [place] in a few months, until then he needs to get shoulder and knee injections as those help his OA  Continues to have issues with trouble losing weight, is following diet recommendations from dietician     Review of Systems  Gen- No fevers, chills  CV- No chest pain, palpitations  Resp- No cough, dyspnea  GI- No N/V/D, abd pain  Neuro-No dizziness, headaches      I have reviewed and/or updated the patient's past medical, surgical, family, social history and problem list as appropriate.     Medications:    Current Outpatient Medications:     albuterol sulfate HFA (Ventolin HFA) 108 (90 Base) MCG/ACT inhaler, Inhale 2 puffs Every 4 (Four) Hours As Needed for Wheezing or Shortness of Air., Disp: 18 g, Rfl: 5    amLODIPine (NORVASC) 10 MG tablet, Take 1 tablet by mouth Daily., Disp: 90 tablet, Rfl: 1    azelastine (ASTELIN) 0.1 % nasal spray, 1 spray into the nostril(s) as directed by provider 2 (Two) Times a Day As Needed for Rhinitis or Allergies. Use in each nostril as directed, Disp: 1 each, Rfl: 5    busPIRone (BUSPAR) 5 MG tablet, TAKE 1 TABLET BY MOUTH TWICE DAILY MAY  INCREASE  TO  2  TWICE  DAILY  AFTER  5  DAYS  IF  NEEDED., Disp: 90 tablet, Rfl: 0    carvedilol (COREG) 25 MG tablet, TAKE 1 TABLET BY MOUTH TWICE DAILY WITH MEALS, Disp: 60 tablet, Rfl: 0    cycloSPORINE (RESTASIS) 0.05 % ophthalmic emulsion, INSTILL 1 DROP INTO EACH EYE TWICE DAILY, Disp: 180 each, Rfl: 0    escitalopram (LEXAPRO) 20 MG tablet, TAKE 1 TABLET BY MOUTH ONCE DAILY IN THE MORNING, Disp: 90  "tablet, Rfl: 0    Fluticasone-Umeclidin-Vilant (TRELEGY) 100-62.5-25 MCG/ACT inhaler, Inhale 1 puff Daily. Rinse mouth out after use, Disp: 1 each, Rfl: 5    folic acid (FOLVITE) 1 MG tablet, Take 1 tablet by mouth Daily., Disp: 90 tablet, Rfl: 3    gabapentin (NEURONTIN) 600 MG tablet, TAKE 1 TABLET BY MOUTH THREE TIMES DAILY, Disp: 90 tablet, Rfl: 1    hydrOXYzine (ATARAX) 10 MG tablet, Take 1 tablet by mouth 3 (Three) Times a Day As Needed for Anxiety., Disp: 30 tablet, Rfl: 2    lisinopril (PRINIVIL,ZESTRIL) 40 MG tablet, Take 1 tablet by mouth Daily., Disp: 90 tablet, Rfl: 1    methotrexate 2.5 MG tablet, Take 6 tablets by mouth 1 (One) Time Per Week., Disp: , Rfl:     pantoprazole (PROTONIX) 40 MG EC tablet, Take 1 tablet by mouth once daily, Disp: 30 tablet, Rfl: 0    torsemide (DEMADEX) 10 MG tablet, Take 1 tablet by mouth Daily., Disp: 30 tablet, Rfl: 2    traMADol (ULTRAM) 50 MG tablet, Take 1 tablet by mouth Every 12 (Twelve) Hours As Needed for Moderate Pain., Disp: 60 tablet, Rfl: 1    Semaglutide-Weight Management (Wegovy) 0.25 MG/0.5ML solution auto-injector, Inject 0.5 mL under the skin into the appropriate area as directed 1 (One) Time Per Week., Disp: 2 mL, Rfl: 0    Allergies:  No Known Allergies    Objective     Vital Signs:   Vitals:    11/20/24 1135   BP: 130/86   BP Location: Left arm   Patient Position: Sitting   Cuff Size: Large Adult   Pulse: 73   SpO2: 98%   Weight: 125 kg (276 lb)   Height: 182.9 cm (72.01\")   PainSc:   5   PainLoc: Back     Body mass index is 37.42 kg/m².    Physical Exam:    Physical Exam  Vitals and nursing note reviewed.   Constitutional:       Appearance: He is well-developed. He is obese.   HENT:      Head: Normocephalic and atraumatic.   Eyes:      Pupils: Pupils are equal, round, and reactive to light.   Cardiovascular:      Rate and Rhythm: Normal rate and regular rhythm.      Heart sounds: Normal heart sounds.   Pulmonary:      Effort: Pulmonary effort is " normal.      Breath sounds: Normal breath sounds.   Abdominal:      General: Bowel sounds are normal. There is no distension.      Palpations: Abdomen is soft.      Tenderness: There is no abdominal tenderness.   Musculoskeletal:      Cervical back: Neck supple.   Skin:     General: Skin is warm and dry.   Neurological:      General: No focal deficit present.      Mental Status: He is alert and oriented to person, place, and time.   Psychiatric:         Mood and Affect: Mood normal.         Behavior: Behavior normal.         Assessment / Plan     Assessment/Plan:   Problem List Items Addressed This Visit       Essential hypertension    Relevant Medications    torsemide (DEMADEX) 10 MG tablet    lisinopril (PRINIVIL,ZESTRIL) 40 MG tablet    amLODIPine (NORVASC) 10 MG tablet    carvedilol (COREG) 25 MG tablet    Semaglutide-Weight Management (Wegovy) 0.25 MG/0.5ML solution auto-injector    Chronic pain of left knee    Chronic bilateral low back pain with left-sided sciatica - Primary    Relevant Medications    gabapentin (NEURONTIN) 600 MG tablet    traMADol (ULTRAM) 50 MG tablet    Rheumatoid arthritis involving multiple sites    Pre-diabetes    Relevant Medications    Semaglutide-Weight Management (Wegovy) 0.25 MG/0.5ML solution auto-injector     Other Visit Diagnoses       Arthritis        Relevant Orders    Ambulatory Referral to Orthopedic Surgery    Encounter for therapeutic drug monitoring        Relevant Orders    Compliance Drug Analysis, Ur - Urine, Clean Catch    At high risk for coronary artery disease        Relevant Medications    Semaglutide-Weight Management (Wegovy) 0.25 MG/0.5ML solution auto-injector    Class 2 severe obesity due to excess calories with serious comorbidity and body mass index (BMI) of 36.0 to 36.9 in adult        Relevant Medications    Semaglutide-Weight Management (Wegovy) 0.25 MG/0.5ML solution auto-injector          -- continue osmany and tramadol, UDS today  -- will get him in  with ortho for injections  -- trial wegovy, continue diet, and exercise    Discussed plan of care in detail with pt today; pt verb understanding and agrees.    Follow up:  As scheduled     Electronically signed by AMY Escamilla   11/20/2024 12:00 EST      Please note that portions of this note were completed with a voice recognition program.

## 2024-11-20 NOTE — TELEPHONE ENCOUNTER
PATIENT IS NEEDING A LETTER STATING THAT HE IS NOT ON ANY TYPE OF DRUGS OR ANYTHING FOR HIS  AND EX WIFE

## 2024-12-06 DIAGNOSIS — I10 ESSENTIAL HYPERTENSION: ICD-10-CM

## 2024-12-06 RX ORDER — AMLODIPINE BESYLATE 10 MG/1
10 TABLET ORAL DAILY
Qty: 90 TABLET | Refills: 0 | Status: SHIPPED | OUTPATIENT
Start: 2024-12-06

## 2024-12-06 RX ORDER — LISINOPRIL 40 MG/1
40 TABLET ORAL DAILY
Qty: 90 TABLET | Refills: 0 | Status: SHIPPED | OUTPATIENT
Start: 2024-12-06

## 2024-12-09 ENCOUNTER — OFFICE VISIT (OUTPATIENT)
Dept: ORTHOPEDIC SURGERY | Facility: CLINIC | Age: 54
End: 2024-12-09
Payer: COMMERCIAL

## 2024-12-09 VITALS
DIASTOLIC BLOOD PRESSURE: 86 MMHG | HEIGHT: 72 IN | WEIGHT: 278 LBS | SYSTOLIC BLOOD PRESSURE: 124 MMHG | BODY MASS INDEX: 37.65 KG/M2

## 2024-12-09 DIAGNOSIS — M25.561 ARTHRALGIA OF KNEE, RIGHT: Primary | ICD-10-CM

## 2024-12-09 DIAGNOSIS — M17.11 ARTHRITIS OF KNEE, RIGHT: ICD-10-CM

## 2024-12-09 DIAGNOSIS — M25.562 LEFT KNEE PAIN, UNSPECIFIED CHRONICITY: Primary | ICD-10-CM

## 2024-12-09 PROCEDURE — 99203 OFFICE O/P NEW LOW 30 MIN: CPT | Performed by: PHYSICIAN ASSISTANT

## 2024-12-09 PROCEDURE — 1159F MED LIST DOCD IN RCRD: CPT | Performed by: PHYSICIAN ASSISTANT

## 2024-12-09 PROCEDURE — 1160F RVW MEDS BY RX/DR IN RCRD: CPT | Performed by: PHYSICIAN ASSISTANT

## 2024-12-09 PROCEDURE — 3074F SYST BP LT 130 MM HG: CPT | Performed by: PHYSICIAN ASSISTANT

## 2024-12-09 PROCEDURE — 3079F DIAST BP 80-89 MM HG: CPT | Performed by: PHYSICIAN ASSISTANT

## 2024-12-09 NOTE — PROGRESS NOTES
"Subjective   Patient ID: Dharmesh Samuel is a 54 y.o. right hand dominant male  Pain of the Left Knee (States he has been having pain for seven or eight years. No known injury. He has gotten injections from an arthritis doctor. )         History of Present Illness  Patient presents with 7 years of progressing left knee pain. NO recent injury/trauma.   \"It feels like nothing is holding my left knee together\"  No mechanical locking.  His RA doctor was giving him cortisone injection which would provide about 2 weeks of relief.                                                   Past Medical History:   Diagnosis Date    Bulging lumbar disc     Depression 2017    Hypertension     Impaired mobility     Low back pain     Obesity 2020    Pinched nerve     Rheumatoid arthritis     Sleep apnea     Visual impairment 2020        No past surgical history on file.    Family History   Problem Relation Age of Onset    Hypertension Father     Sleep apnea Paternal Uncle     Alcohol abuse Maternal Grandfather     Arthritis Maternal Grandmother     Arthritis Paternal Grandmother        Social History     Socioeconomic History    Marital status:    Tobacco Use    Smoking status: Every Day     Current packs/day: 1.00     Average packs/day: 1 pack/day for 15.0 years (15.0 ttl pk-yrs)     Types: Cigarettes     Passive exposure: Current    Smokeless tobacco: Former     Types: Snuff   Vaping Use    Vaping status: Never Used   Substance and Sexual Activity    Alcohol use: Yes     Comment: 4 drinks a day    Drug use: Not Currently     Types: Marijuana    Sexual activity: Yes     Partners: Female     Birth control/protection: None         Current Outpatient Medications:     albuterol sulfate HFA (Ventolin HFA) 108 (90 Base) MCG/ACT inhaler, Inhale 2 puffs Every 4 (Four) Hours As Needed for Wheezing or Shortness of Air., Disp: 18 g, Rfl: 5    amLODIPine (NORVASC) 10 MG tablet, Take 1 tablet by mouth once daily, Disp: 90 tablet, Rfl: 0    " azelastine (ASTELIN) 0.1 % nasal spray, 1 spray into the nostril(s) as directed by provider 2 (Two) Times a Day As Needed for Rhinitis or Allergies. Use in each nostril as directed, Disp: 1 each, Rfl: 5    busPIRone (BUSPAR) 5 MG tablet, TAKE 1 TABLET BY MOUTH TWICE DAILY MAY  INCREASE  TO  2  TWICE  DAILY  AFTER  5  DAYS  IF  NEEDED., Disp: 90 tablet, Rfl: 0    carvedilol (COREG) 25 MG tablet, TAKE 1 TABLET BY MOUTH TWICE DAILY WITH MEALS, Disp: 60 tablet, Rfl: 0    cycloSPORINE (RESTASIS) 0.05 % ophthalmic emulsion, INSTILL 1 DROP INTO EACH EYE TWICE DAILY, Disp: 180 each, Rfl: 0    escitalopram (LEXAPRO) 20 MG tablet, TAKE 1 TABLET BY MOUTH ONCE DAILY IN THE MORNING, Disp: 90 tablet, Rfl: 0    Fluticasone-Umeclidin-Vilant (TRELEGY) 100-62.5-25 MCG/ACT inhaler, Inhale 1 puff Daily. Rinse mouth out after use, Disp: 1 each, Rfl: 5    folic acid (FOLVITE) 1 MG tablet, Take 1 tablet by mouth Daily., Disp: 90 tablet, Rfl: 3    gabapentin (NEURONTIN) 600 MG tablet, TAKE 1 TABLET BY MOUTH THREE TIMES DAILY, Disp: 90 tablet, Rfl: 1    hydrOXYzine (ATARAX) 10 MG tablet, Take 1 tablet by mouth 3 (Three) Times a Day As Needed for Anxiety., Disp: 30 tablet, Rfl: 2    lisinopril (PRINIVIL,ZESTRIL) 40 MG tablet, Take 1 tablet by mouth once daily, Disp: 90 tablet, Rfl: 0    methotrexate 2.5 MG tablet, Take 6 tablets by mouth 1 (One) Time Per Week., Disp: , Rfl:     pantoprazole (PROTONIX) 40 MG EC tablet, Take 1 tablet by mouth once daily, Disp: 30 tablet, Rfl: 0    Semaglutide-Weight Management (Wegovy) 0.25 MG/0.5ML solution auto-injector, Inject 0.5 mL under the skin into the appropriate area as directed 1 (One) Time Per Week., Disp: 2 mL, Rfl: 0    torsemide (DEMADEX) 10 MG tablet, Take 1 tablet by mouth Daily., Disp: 30 tablet, Rfl: 2    traMADol (ULTRAM) 50 MG tablet, Take 1 tablet by mouth Every 12 (Twelve) Hours As Needed for Moderate Pain., Disp: 60 tablet, Rfl: 1    No Known Allergies    Review of Systems  See HPI    I  "have reviewed the medical and surgical history, family history, social history, medications, and/or allergies, and the review of systems of this report.    Objective   /86   Ht 182.9 cm (72.01\")   Wt 126 kg (278 lb)   BMI 37.70 kg/m²    Physical Exam  Vitals and nursing note reviewed.   Constitutional:       Appearance: Normal appearance.   Pulmonary:      Effort: Pulmonary effort is normal.   Musculoskeletal:      Left knee: Crepitus present. No deformity, effusion, erythema or ecchymosis. Tenderness present over the medial joint line and lateral joint line.      Instability Tests: Anterior drawer test negative. Anterior Lachman test negative. Medial Jennifer test negative and lateral Jennifer test negative.   Neurological:      Mental Status: He is alert and oriented to person, place, and time.       Left Knee Exam     Range of Motion   Left knee extension: 5.   Left knee flexion: 90.     Tests   Jennifer:  Medial - negative Lateral - negative    Other   Erythema: absent  Effusion: no effusion present           Extremity DVT signs are negative on physical exam with negative Perry sign, no calf pain, no palpable cords, and no skin tone change   Neurological Exam  Mental Status  Alert. Oriented to person, place, and time.       Chronic bilateral lower extremity venous stasis dermatitis      Assessment & Plan   Independent Review of Radiographic Studies:    X-ray of the left knee 2 view weightbearing performed in our clinic independently reviewed for the evaluation of knee pain.  No comparison films available to review.  No acute fracture.  There is significant distal medial lateral femoral condyle spurring as well as superior patella spurring.  The x-ray on today's imaging is most consistent with Kellgren-Dov grade 4      Procedures       Diagnoses and all orders for this visit:    1. Arthralgia of knee, right (Primary)    2. Arthritis of knee, right       Discussion of orthopedic goals  Orthopedic " activities reviewed and patient expressed appreciation  Risk, benefits, and merits of treatment alternatives reviewed with the patient and questions answered  Ice, heat, and/or modalities as beneficial    Recommendations/Plan:  Exercise, medications, injections, other patient advice, and return appointment as noted.  Patient is encouraged to call or return for any issues or concerns.  We discussed options of visco injection ( though with his history of RA, he may not obtain complete pain relief) vs. TKA.   We will obtain approval from his insurance for left knee viscosupplementation injection.  Our office will contact him to set up the injection appointments.  Patient expresses understanding and concurs with the plan of care.    Patient agreeable to call or return sooner for any concerns.

## 2024-12-16 ENCOUNTER — PATIENT ROUNDING (BHMG ONLY) (OUTPATIENT)
Dept: ORTHOPEDIC SURGERY | Facility: CLINIC | Age: 54
End: 2024-12-16
Payer: COMMERCIAL

## 2024-12-16 NOTE — PROGRESS NOTES
"December 16, 2024    Hello, may I speak with Dharmesh Samuel?    My name is Holly      I am  with [facility]  [address] [place] [id number] [phone].    Before we get started may I verify your date of birth? 1970    I am calling to officially welcome you to our practice and ask about your recent visit. Is this a good time to talk? yes    Tell me about your visit with us. What things went well?  \"appointment went good\"       We're always looking for ways to make our patients' experiences even better. Do you have recommendations on ways we may improve?  no    Overall were you satisfied with your first visit to our practice? yes       I appreciate you taking the time to speak with me today. Is there anything else I can do for you? no      Thank you, and have a great day.      "

## 2024-12-23 ENCOUNTER — OFFICE VISIT (OUTPATIENT)
Dept: FAMILY MEDICINE CLINIC | Facility: CLINIC | Age: 54
End: 2024-12-23
Payer: COMMERCIAL

## 2024-12-23 VITALS
HEIGHT: 72 IN | SYSTOLIC BLOOD PRESSURE: 140 MMHG | TEMPERATURE: 97.9 F | BODY MASS INDEX: 37.11 KG/M2 | OXYGEN SATURATION: 97 % | RESPIRATION RATE: 14 BRPM | HEART RATE: 70 BPM | WEIGHT: 274 LBS | DIASTOLIC BLOOD PRESSURE: 98 MMHG

## 2024-12-23 DIAGNOSIS — F33.0 MILD EPISODE OF RECURRENT MAJOR DEPRESSIVE DISORDER: ICD-10-CM

## 2024-12-23 DIAGNOSIS — L29.9 ITCHY SKIN: ICD-10-CM

## 2024-12-23 DIAGNOSIS — N18.2 CKD (CHRONIC KIDNEY DISEASE) STAGE 2, GFR 60-89 ML/MIN: ICD-10-CM

## 2024-12-23 DIAGNOSIS — Z11.59 ENCOUNTER FOR HEPATITIS C SCREENING TEST FOR LOW RISK PATIENT: ICD-10-CM

## 2024-12-23 DIAGNOSIS — R73.03 PRE-DIABETES: ICD-10-CM

## 2024-12-23 DIAGNOSIS — I10 ESSENTIAL HYPERTENSION: ICD-10-CM

## 2024-12-23 DIAGNOSIS — M54.42 CHRONIC BILATERAL LOW BACK PAIN WITH LEFT-SIDED SCIATICA: ICD-10-CM

## 2024-12-23 DIAGNOSIS — J41.8 MIXED SIMPLE AND MUCOPURULENT CHRONIC BRONCHITIS: ICD-10-CM

## 2024-12-23 DIAGNOSIS — R09.81 SINUS CONGESTION: Primary | ICD-10-CM

## 2024-12-23 DIAGNOSIS — K21.9 GASTROESOPHAGEAL REFLUX DISEASE WITHOUT ESOPHAGITIS: ICD-10-CM

## 2024-12-23 DIAGNOSIS — M06.9 RHEUMATOID ARTHRITIS INVOLVING MULTIPLE SITES, UNSPECIFIED WHETHER RHEUMATOID FACTOR PRESENT: ICD-10-CM

## 2024-12-23 DIAGNOSIS — G89.29 CHRONIC BILATERAL LOW BACK PAIN WITH LEFT-SIDED SCIATICA: ICD-10-CM

## 2024-12-23 LAB
EXPIRATION DATE: NORMAL
FLUAV AG UPPER RESP QL IA.RAPID: NOT DETECTED
FLUBV AG UPPER RESP QL IA.RAPID: NOT DETECTED
INTERNAL CONTROL: NORMAL
Lab: NORMAL
SARS-COV-2 AG UPPER RESP QL IA.RAPID: NOT DETECTED

## 2024-12-23 PROCEDURE — 87428 SARSCOV & INF VIR A&B AG IA: CPT | Performed by: NURSE PRACTITIONER

## 2024-12-23 PROCEDURE — 1160F RVW MEDS BY RX/DR IN RCRD: CPT | Performed by: NURSE PRACTITIONER

## 2024-12-23 PROCEDURE — 1159F MED LIST DOCD IN RCRD: CPT | Performed by: NURSE PRACTITIONER

## 2024-12-23 PROCEDURE — 3077F SYST BP >= 140 MM HG: CPT | Performed by: NURSE PRACTITIONER

## 2024-12-23 PROCEDURE — 1125F AMNT PAIN NOTED PAIN PRSNT: CPT | Performed by: NURSE PRACTITIONER

## 2024-12-23 PROCEDURE — 3080F DIAST BP >= 90 MM HG: CPT | Performed by: NURSE PRACTITIONER

## 2024-12-23 PROCEDURE — 99214 OFFICE O/P EST MOD 30 MIN: CPT | Performed by: NURSE PRACTITIONER

## 2024-12-23 RX ORDER — AZELASTINE 1 MG/ML
1 SPRAY, METERED NASAL 2 TIMES DAILY PRN
Qty: 1 EACH | Refills: 5 | Status: SHIPPED | OUTPATIENT
Start: 2024-12-23

## 2024-12-23 RX ORDER — TRIAMCINOLONE ACETONIDE 1 MG/ML
LOTION TOPICAL 2 TIMES DAILY
Qty: 60 ML | Refills: 1 | Status: SHIPPED | OUTPATIENT
Start: 2024-12-23

## 2024-12-23 RX ORDER — FOLIC ACID 1 MG/1
1000 TABLET ORAL DAILY
Qty: 90 TABLET | Refills: 3 | Status: SHIPPED | OUTPATIENT
Start: 2024-12-23

## 2024-12-23 NOTE — PROGRESS NOTES
Subjective     Chief Complaint:    Chief Complaint   Patient presents with    Pain    Med Refill     Folic acid    Sinus Problem       History of Present Illness:   QUEENIE/situational depression, controlled on current regimen   Obesity, has cut back on diet and following with nutritionist, no weight loss  HTN, on coreg, norvasc, lisinopril, demedax, typically well controlled  CKD, 3b (aktar)  Lupus and RA,  and mtx, used to see Ahmed, now going to see [facility] [place], recent saw ortho and plans for injection   Chronic low back pain, chronic neck pain, Taking gabapentin and tramadol prn  Copd, on trelegy, daily, still with some congestion, continues to smoke  Gerd on ppi   Notes some skin itching on upper back and abdomen, no rash that he knows,   Mild sinus congestion       Review of Systems  Gen- No fevers, chills  CV- No chest pain, palpitations  Resp- No cough, dyspnea  GI- No N/V/D, abd pain  Neuro-No dizziness, headaches      I have reviewed and/or updated the patient's past medical, surgical, family, social history and problem list as appropriate.     Medications:    Current Outpatient Medications:     albuterol sulfate HFA (Ventolin HFA) 108 (90 Base) MCG/ACT inhaler, Inhale 2 puffs Every 4 (Four) Hours As Needed for Wheezing or Shortness of Air., Disp: 18 g, Rfl: 5    amLODIPine (NORVASC) 10 MG tablet, Take 1 tablet by mouth once daily, Disp: 90 tablet, Rfl: 0    azelastine (ASTELIN) 0.1 % nasal spray, Administer 1 spray into the nostril(s) as directed by provider 2 (Two) Times a Day As Needed for Rhinitis or Allergies. Use in each nostril as directed, Disp: 1 each, Rfl: 5    busPIRone (BUSPAR) 5 MG tablet, TAKE 1 TABLET BY MOUTH TWICE DAILY MAY  INCREASE  TO  2  TWICE  DAILY  AFTER  5  DAYS  IF  NEEDED., Disp: 90 tablet, Rfl: 0    carvedilol (COREG) 25 MG tablet, TAKE 1 TABLET BY MOUTH TWICE DAILY WITH MEALS, Disp: 60 tablet, Rfl: 0    cycloSPORINE (RESTASIS) 0.05 % ophthalmic emulsion, INSTILL 1 DROP INTO  "EACH EYE TWICE DAILY, Disp: 180 each, Rfl: 0    escitalopram (LEXAPRO) 20 MG tablet, TAKE 1 TABLET BY MOUTH ONCE DAILY IN THE MORNING, Disp: 90 tablet, Rfl: 0    Fluticasone-Umeclidin-Vilant (TRELEGY) 100-62.5-25 MCG/ACT inhaler, Inhale 1 puff Daily. Rinse mouth out after use, Disp: 1 each, Rfl: 5    folic acid (FOLVITE) 1 MG tablet, Take 1 tablet by mouth Daily., Disp: 90 tablet, Rfl: 3    gabapentin (NEURONTIN) 600 MG tablet, TAKE 1 TABLET BY MOUTH THREE TIMES DAILY, Disp: 90 tablet, Rfl: 1    hydrOXYzine (ATARAX) 10 MG tablet, Take 1 tablet by mouth 3 (Three) Times a Day As Needed for Anxiety., Disp: 30 tablet, Rfl: 2    lisinopril (PRINIVIL,ZESTRIL) 40 MG tablet, Take 1 tablet by mouth once daily, Disp: 90 tablet, Rfl: 0    methotrexate 2.5 MG tablet, Take 6 tablets by mouth 1 (One) Time Per Week., Disp: , Rfl:     pantoprazole (PROTONIX) 40 MG EC tablet, Take 1 tablet by mouth once daily, Disp: 30 tablet, Rfl: 0    Semaglutide-Weight Management (Wegovy) 0.25 MG/0.5ML solution auto-injector, Inject 0.5 mL under the skin into the appropriate area as directed 1 (One) Time Per Week., Disp: 2 mL, Rfl: 0    torsemide (DEMADEX) 10 MG tablet, Take 1 tablet by mouth Daily., Disp: 30 tablet, Rfl: 2    traMADol (ULTRAM) 50 MG tablet, Take 1 tablet by mouth Every 12 (Twelve) Hours As Needed for Moderate Pain., Disp: 60 tablet, Rfl: 1    triamcinolone (KENALOG) 0.1 % lotion, Apply  topically to the appropriate area as directed 2 (Two) Times a Day., Disp: 60 mL, Rfl: 1    Allergies:  No Known Allergies    Objective     Vital Signs:   Vitals:    12/23/24 1306   BP: 140/98   BP Location: Left arm   Patient Position: Sitting   Cuff Size: Adult   Pulse: 70   Resp: 14   Temp: 97.9 °F (36.6 °C)   TempSrc: Infrared   SpO2: 97%   Weight: 124 kg (274 lb)   Height: 182.9 cm (72.01\")   PainSc: 10-Worst pain ever   PainLoc: Knee     Body mass index is 37.15 kg/m².    Physical Exam:    Physical Exam  Vitals and nursing note reviewed. "   Constitutional:       Appearance: He is well-developed.   HENT:      Head: Normocephalic and atraumatic.   Eyes:      Pupils: Pupils are equal, round, and reactive to light.   Cardiovascular:      Rate and Rhythm: Normal rate and regular rhythm.      Heart sounds: Normal heart sounds.   Pulmonary:      Effort: Pulmonary effort is normal.      Breath sounds: Normal breath sounds.   Abdominal:      General: Bowel sounds are normal. There is no distension.      Palpations: Abdomen is soft.      Tenderness: There is no abdominal tenderness.   Musculoskeletal:      Cervical back: Neck supple.   Skin:     General: Skin is warm and dry.   Neurological:      General: No focal deficit present.      Mental Status: He is alert and oriented to person, place, and time.   Psychiatric:         Mood and Affect: Mood normal.         Behavior: Behavior normal.         Assessment / Plan     Assessment/Plan:   Problem List Items Addressed This Visit       Essential hypertension    Relevant Medications    torsemide (DEMADEX) 10 MG tablet    carvedilol (COREG) 25 MG tablet    Semaglutide-Weight Management (Wegovy) 0.25 MG/0.5ML solution auto-injector    amLODIPine (NORVASC) 10 MG tablet    lisinopril (PRINIVIL,ZESTRIL) 40 MG tablet    Chronic bilateral low back pain with left-sided sciatica    Relevant Medications    gabapentin (NEURONTIN) 600 MG tablet    traMADol (ULTRAM) 50 MG tablet    CKD (chronic kidney disease) stage 2, GFR 60-89 ml/min    Relevant Medications    torsemide (DEMADEX) 10 MG tablet    Mild episode of recurrent major depressive disorder    Relevant Medications    busPIRone (BUSPAR) 5 MG tablet    hydrOXYzine (ATARAX) 10 MG tablet    escitalopram (LEXAPRO) 20 MG tablet    Semaglutide-Weight Management (Wegovy) 0.25 MG/0.5ML solution auto-injector    Rheumatoid arthritis involving multiple sites    Pre-diabetes    Relevant Medications    Semaglutide-Weight Management (Wegovy) 0.25 MG/0.5ML solution auto-injector      Other Visit Diagnoses       Sinus congestion    -  Primary    Relevant Orders    POCT SARS-CoV-2 Antigen TAMEKA + Flu (Completed)    Mixed simple and mucopurulent chronic bronchitis        Relevant Medications    azelastine (ASTELIN) 0.1 % nasal spray    Gastroesophageal reflux disease without esophagitis        Itchy skin        Relevant Medications    triamcinolone (KENALOG) 0.1 % lotion    Other Relevant Orders    Comprehensive Metabolic Panel    CBC (No Diff)    Encounter for hepatitis C screening test for low risk patient        Relevant Orders    Hepatitis C Antibody            -- continue diet and exercise for weight loss  -- BP stable for the most part, no changes today   -- mood stable  -- continue f/u with speclialist  --The current medical regimen is effective;  continue present plan and medications.  Controlled Substance  Prescription  As part of this patient's treatment plan I am prescribing controlled substances. The patient has been made aware of appropriate use of such medications, including potential risk of somnolence, limited ability to drive and /or work safely, and potential for dependence or overdose. It has also been made clear that these medications are for use by this patient only, without concomitant use of alcohol or other substances unless prescribed. Andrews reviewed and appropriate     Discussed plan of care in detail with pt today; pt verb understanding and agrees.    Follow up:  3 months    Electronically signed by AMY Escamilla       Please note that portions of this note were completed with a voice recognition program.

## 2024-12-24 LAB
ALBUMIN SERPL-MCNC: 4 G/DL (ref 3.5–5.2)
ALBUMIN/GLOB SERPL: 1 G/DL
ALP SERPL-CCNC: 82 U/L (ref 39–117)
ALT SERPL-CCNC: 22 U/L (ref 1–41)
AST SERPL-CCNC: 21 U/L (ref 1–40)
BILIRUB SERPL-MCNC: 0.4 MG/DL (ref 0–1.2)
BUN SERPL-MCNC: 46 MG/DL (ref 6–20)
BUN/CREAT SERPL: 28.9 (ref 7–25)
CALCIUM SERPL-MCNC: 9.7 MG/DL (ref 8.6–10.5)
CHLORIDE SERPL-SCNC: 100 MMOL/L (ref 98–107)
CO2 SERPL-SCNC: 27.4 MMOL/L (ref 22–29)
CREAT SERPL-MCNC: 1.59 MG/DL (ref 0.76–1.27)
EGFRCR SERPLBLD CKD-EPI 2021: 51.6 ML/MIN/1.73
ERYTHROCYTE [DISTWIDTH] IN BLOOD BY AUTOMATED COUNT: 14.4 % (ref 12.3–15.4)
GLOBULIN SER CALC-MCNC: 4.1 GM/DL
GLUCOSE SERPL-MCNC: 105 MG/DL (ref 65–99)
HCT VFR BLD AUTO: 47.2 % (ref 37.5–51)
HCV IGG SERPL QL IA: NON REACTIVE
HGB BLD-MCNC: 16.2 G/DL (ref 13–17.7)
MCH RBC QN AUTO: 33.1 PG (ref 26.6–33)
MCHC RBC AUTO-ENTMCNC: 34.3 G/DL (ref 31.5–35.7)
MCV RBC AUTO: 96.3 FL (ref 79–97)
PLATELET # BLD AUTO: 278 10*3/MM3 (ref 140–450)
POTASSIUM SERPL-SCNC: 4.7 MMOL/L (ref 3.5–5.2)
PROT SERPL-MCNC: 8.1 G/DL (ref 6–8.5)
RBC # BLD AUTO: 4.9 10*6/MM3 (ref 4.14–5.8)
SODIUM SERPL-SCNC: 136 MMOL/L (ref 136–145)
WBC # BLD AUTO: 6.61 10*3/MM3 (ref 3.4–10.8)

## 2024-12-31 LAB — DRUGS UR: NORMAL

## 2025-01-02 ENCOUNTER — OFFICE VISIT (OUTPATIENT)
Dept: ORTHOPEDIC SURGERY | Facility: CLINIC | Age: 55
End: 2025-01-02
Payer: COMMERCIAL

## 2025-01-02 VITALS
BODY MASS INDEX: 37.11 KG/M2 | SYSTOLIC BLOOD PRESSURE: 142 MMHG | HEIGHT: 72 IN | WEIGHT: 274 LBS | DIASTOLIC BLOOD PRESSURE: 96 MMHG

## 2025-01-02 DIAGNOSIS — M17.12 ARTHRITIS OF KNEE, LEFT: ICD-10-CM

## 2025-01-02 DIAGNOSIS — M25.562 LEFT KNEE PAIN, UNSPECIFIED CHRONICITY: Primary | ICD-10-CM

## 2025-01-02 RX ORDER — HYALURONATE SODIUM 10 MG/ML
20 SYRINGE (ML) INTRAARTICULAR
Status: COMPLETED | OUTPATIENT
Start: 2025-01-02 | End: 2025-01-02

## 2025-01-02 RX ADMIN — Medication 20 MG: at 11:24

## 2025-01-02 NOTE — PROGRESS NOTES
"Olu Samuel is a 54 y.o. male here today for injection therapy.    Chief Complaint   Patient presents with    Left Knee - Injections     Euflexxa #1   Patient presents for a Visco injection left knee #1.    Past Medical History:   Diagnosis Date    Bulging lumbar disc     Depression 2017    Hypertension     Impaired mobility     Low back pain     Obesity 2020    Pinched nerve     Rheumatoid arthritis     Sleep apnea     Visual impairment 2020         No past surgical history on file.    No Known Allergies    Objective   /96   Ht 182.9 cm (72.01\")   Wt 124 kg (274 lb)   BMI 37.15 kg/m²    Physical Exam  Vitals and nursing note reviewed.   Constitutional:       Appearance: Normal appearance.   Pulmonary:      Effort: Pulmonary effort is normal.   Neurological:      Mental Status: He is alert and oriented to person, place, and time.         Skin exam stable with no erythema, ecchymosis or rash.  No new swelling.  No motor or sensory changes.  Distal pulse intact.    Large Joint Arthrocentesis: L knee  Date/Time: 1/2/2025 11:24 AM  Consent given by: patient  Site marked: site marked  Timeout: Immediately prior to procedure a time out was called to verify the correct patient, procedure, equipment, support staff and site/side marked as required   Supporting Documentation  Indications: pain   Procedure Details  Location: knee - L knee  Preparation: Patient was prepped and draped in the usual sterile fashion  Needle gauge: 21g.  Approach: anterolateral  Medications administered: 20 mg Sodium Hyaluronate (Viscosup) 20 MG/2ML  Patient tolerance: patient tolerated the procedure well with no immediate complications        Assessment & Plan      Diagnosis Plan   1. Left knee pain, unspecified chronicity        2. Arthritis of knee, left            Regular exercise as tolerated  Ice, heat, and/or modalities as beneficial  Watch for signs and symptoms of infection  Call or notify for any adverse effect from " injection therapy    Recommendations:    Follow-up in 1 week or sooner if needed.  Patient agreeable to call or return sooner for any concerns.

## 2025-01-07 ENCOUNTER — OFFICE VISIT (OUTPATIENT)
Dept: FAMILY MEDICINE CLINIC | Facility: CLINIC | Age: 55
End: 2025-01-07
Payer: COMMERCIAL

## 2025-01-07 VITALS
HEART RATE: 63 BPM | HEIGHT: 72 IN | RESPIRATION RATE: 14 BRPM | SYSTOLIC BLOOD PRESSURE: 140 MMHG | OXYGEN SATURATION: 96 % | WEIGHT: 280 LBS | TEMPERATURE: 98.8 F | BODY MASS INDEX: 37.93 KG/M2 | DIASTOLIC BLOOD PRESSURE: 98 MMHG

## 2025-01-07 DIAGNOSIS — K02.9 DENTAL CARIES: ICD-10-CM

## 2025-01-07 DIAGNOSIS — K04.7 DENTAL ABSCESS: Primary | ICD-10-CM

## 2025-01-07 PROCEDURE — 1159F MED LIST DOCD IN RCRD: CPT | Performed by: NURSE PRACTITIONER

## 2025-01-07 PROCEDURE — 1125F AMNT PAIN NOTED PAIN PRSNT: CPT | Performed by: NURSE PRACTITIONER

## 2025-01-07 PROCEDURE — 1160F RVW MEDS BY RX/DR IN RCRD: CPT | Performed by: NURSE PRACTITIONER

## 2025-01-07 PROCEDURE — 99213 OFFICE O/P EST LOW 20 MIN: CPT | Performed by: NURSE PRACTITIONER

## 2025-01-07 PROCEDURE — 3080F DIAST BP >= 90 MM HG: CPT | Performed by: NURSE PRACTITIONER

## 2025-01-07 PROCEDURE — 3077F SYST BP >= 140 MM HG: CPT | Performed by: NURSE PRACTITIONER

## 2025-01-07 NOTE — PROGRESS NOTES
Subjective     Chief Complaint:    Chief Complaint   Patient presents with    Dental Problem     Pt reports abscess and requests orthodontist referral if possible        History of Present Illness:   Notes pain in upper and lower gums and teeth on left side, has broken teeth on that side, notes infection in taste, and swelling in gums, notes decay as well, has not been to the dentist       Review of Systems  Gen- No fevers, chills  CV- No chest pain, palpitations  Resp- No cough, dyspnea  GI- No N/V/D, abd pain  Neuro-No dizziness, headaches      I have reviewed and/or updated the patient's past medical, surgical, family, social history and problem list as appropriate.     Medications:    Current Outpatient Medications:     albuterol sulfate HFA (Ventolin HFA) 108 (90 Base) MCG/ACT inhaler, Inhale 2 puffs Every 4 (Four) Hours As Needed for Wheezing or Shortness of Air., Disp: 18 g, Rfl: 5    amLODIPine (NORVASC) 10 MG tablet, Take 1 tablet by mouth once daily, Disp: 90 tablet, Rfl: 0    azelastine (ASTELIN) 0.1 % nasal spray, Administer 1 spray into the nostril(s) as directed by provider 2 (Two) Times a Day As Needed for Rhinitis or Allergies. Use in each nostril as directed, Disp: 1 each, Rfl: 5    busPIRone (BUSPAR) 5 MG tablet, TAKE 1 TABLET BY MOUTH TWICE DAILY MAY  INCREASE  TO  2  TWICE  DAILY  AFTER  5  DAYS  IF  NEEDED., Disp: 90 tablet, Rfl: 0    carvedilol (COREG) 25 MG tablet, TAKE 1 TABLET BY MOUTH TWICE DAILY WITH MEALS, Disp: 60 tablet, Rfl: 0    cycloSPORINE (RESTASIS) 0.05 % ophthalmic emulsion, INSTILL 1 DROP INTO EACH EYE TWICE DAILY, Disp: 180 each, Rfl: 0    escitalopram (LEXAPRO) 20 MG tablet, TAKE 1 TABLET BY MOUTH ONCE DAILY IN THE MORNING, Disp: 90 tablet, Rfl: 0    Fluticasone-Umeclidin-Vilant (TRELEGY) 100-62.5-25 MCG/ACT inhaler, Inhale 1 puff Daily. Rinse mouth out after use, Disp: 1 each, Rfl: 5    folic acid (FOLVITE) 1 MG tablet, Take 1 tablet by mouth Daily., Disp: 90 tablet,  Inpatient consult to Physical Medicine Rehab  Consult performed by: SINCERE Perdue  Consult ordered by: Edelmira Gottlieb NP  Reason for consult: rehab evaluation      Consult received.  Rehab team already following.    JESSICA Membreno, RANDYP-C  Physical Medicine & Rehabilitation   10/09/2019  Spectralink: [id number]     "Rfl: 3    gabapentin (NEURONTIN) 600 MG tablet, TAKE 1 TABLET BY MOUTH THREE TIMES DAILY, Disp: 90 tablet, Rfl: 1    hydrOXYzine (ATARAX) 10 MG tablet, Take 1 tablet by mouth 3 (Three) Times a Day As Needed for Anxiety., Disp: 30 tablet, Rfl: 2    lisinopril (PRINIVIL,ZESTRIL) 40 MG tablet, Take 1 tablet by mouth once daily, Disp: 90 tablet, Rfl: 0    methotrexate 2.5 MG tablet, Take 6 tablets by mouth 1 (One) Time Per Week., Disp: , Rfl:     pantoprazole (PROTONIX) 40 MG EC tablet, Take 1 tablet by mouth once daily, Disp: 30 tablet, Rfl: 0    Semaglutide-Weight Management (Wegovy) 0.25 MG/0.5ML solution auto-injector, Inject 0.5 mL under the skin into the appropriate area as directed 1 (One) Time Per Week., Disp: 2 mL, Rfl: 0    torsemide (DEMADEX) 10 MG tablet, Take 1 tablet by mouth Daily., Disp: 30 tablet, Rfl: 2    traMADol (ULTRAM) 50 MG tablet, Take 1 tablet by mouth Every 12 (Twelve) Hours As Needed for Moderate Pain., Disp: 60 tablet, Rfl: 1    triamcinolone (KENALOG) 0.1 % lotion, Apply  topically to the appropriate area as directed 2 (Two) Times a Day., Disp: 60 mL, Rfl: 1    amoxicillin-clavulanate (AUGMENTIN) 875-125 MG per tablet, Take 1 tablet by mouth 2 (Two) Times a Day for 10 days., Disp: 20 tablet, Rfl: 0    Allergies:  No Known Allergies    Objective     Vital Signs:   Vitals:    01/07/25 1053   BP: 140/98   BP Location: Left arm   Patient Position: Sitting   Cuff Size: Adult   Pulse: 63   Resp: 14   Temp: 98.8 °F (37.1 °C)   TempSrc: Infrared   SpO2: 96%   Weight: 127 kg (280 lb)   Height: 182.9 cm (72.01\")   PainSc:   9   PainLoc: Teeth     Body mass index is 37.97 kg/m².    Physical Exam:    Physical Exam  HENT:      Head: Normocephalic and atraumatic.      Nose: Nose normal.      Mouth/Throat:      Comments: Dental caries noted, swelling of gums  Eyes:      Pupils: Pupils are equal, round, and reactive to light.   Cardiovascular:      Rate and Rhythm: Normal rate.   Pulmonary:      Effort: " Pulmonary effort is normal.   Neurological:      General: No focal deficit present.      Mental Status: He is alert and oriented to person, place, and time.   Psychiatric:         Mood and Affect: Mood normal.         Behavior: Behavior normal.         Assessment / Plan     Assessment/Plan:   Problem List Items Addressed This Visit    None  Visit Diagnoses       Dental abscess    -  Primary    Relevant Medications    amoxicillin-clavulanate (AUGMENTIN) 875-125 MG per tablet    Other Relevant Orders    Ambulatory Referral to Dentistry    Dental caries        Relevant Medications    amoxicillin-clavulanate (AUGMENTIN) 875-125 MG per tablet    Other Relevant Orders    Ambulatory Referral to Dentistry          -- anbx, needs dental     Discussed plan of care in detail with pt today; pt verb understanding and agrees.    Follow up:  As needed    Electronically signed by AMY Escamilla   01/07/2025 11:06 EST      Please note that portions of this note were completed with a voice recognition program.

## 2025-01-09 ENCOUNTER — OFFICE VISIT (OUTPATIENT)
Dept: ORTHOPEDIC SURGERY | Facility: CLINIC | Age: 55
End: 2025-01-09
Payer: COMMERCIAL

## 2025-01-09 VITALS
HEIGHT: 72 IN | BODY MASS INDEX: 24.22 KG/M2 | WEIGHT: 178.8 LBS | SYSTOLIC BLOOD PRESSURE: 138 MMHG | DIASTOLIC BLOOD PRESSURE: 82 MMHG

## 2025-01-09 DIAGNOSIS — M25.562 LEFT KNEE PAIN, UNSPECIFIED CHRONICITY: Primary | ICD-10-CM

## 2025-01-09 DIAGNOSIS — M17.12 ARTHRITIS OF KNEE, LEFT: ICD-10-CM

## 2025-01-09 RX ORDER — HYALURONATE SODIUM 10 MG/ML
20 SYRINGE (ML) INTRAARTICULAR
Status: COMPLETED | OUTPATIENT
Start: 2025-01-09 | End: 2025-01-09

## 2025-01-09 RX ADMIN — Medication 20 MG: at 11:03

## 2025-01-16 ENCOUNTER — OFFICE VISIT (OUTPATIENT)
Dept: ORTHOPEDIC SURGERY | Facility: CLINIC | Age: 55
End: 2025-01-16
Payer: COMMERCIAL

## 2025-01-16 VITALS
SYSTOLIC BLOOD PRESSURE: 128 MMHG | BODY MASS INDEX: 24.11 KG/M2 | HEIGHT: 72 IN | WEIGHT: 178 LBS | DIASTOLIC BLOOD PRESSURE: 82 MMHG

## 2025-01-16 DIAGNOSIS — M25.562 LEFT KNEE PAIN, UNSPECIFIED CHRONICITY: Primary | ICD-10-CM

## 2025-01-16 DIAGNOSIS — M17.12 ARTHRITIS OF KNEE, LEFT: ICD-10-CM

## 2025-01-16 RX ORDER — HYALURONATE SODIUM 10 MG/ML
20 SYRINGE (ML) INTRAARTICULAR
Status: COMPLETED | OUTPATIENT
Start: 2025-01-16 | End: 2025-01-16

## 2025-01-16 RX ADMIN — Medication 20 MG: at 11:03

## 2025-01-16 NOTE — PROGRESS NOTES
"Olu Samuel is a 54 y.o. male here today for injection therapy.    Chief Complaint   Patient presents with    Left Knee - Injections     Euflexxa #3     Patient presents for visco injection # 3 left knee.  Past Medical History:   Diagnosis Date    Bulging lumbar disc     Depression 2017    Hypertension     Impaired mobility     Low back pain     Obesity 2020    Pinched nerve     Rheumatoid arthritis     Sleep apnea     Visual impairment 2020         No past surgical history on file.    No Known Allergies    Objective   /82   Ht 182.9 cm (72.01\")   Wt 80.7 kg (178 lb)   BMI 24.13 kg/m²    Physical Exam    Skin exam stable with no erythema, ecchymosis or rash.  No new swelling.  No motor or sensory changes.  Distal pulse intact.    Large Joint Arthrocentesis: L knee  Date/Time: 1/16/2025 11:03 AM  Consent given by: patient  Site marked: site marked  Timeout: Immediately prior to procedure a time out was called to verify the correct patient, procedure, equipment, support staff and site/side marked as required   Supporting Documentation  Indications: pain   Procedure Details  Location: knee - L knee  Preparation: Patient was prepped and draped in the usual sterile fashion  Needle gauge: 21g.  Approach: anterolateral  Medications administered: 20 mg Sodium Hyaluronate (Viscosup) 20 MG/2ML  Patient tolerance: patient tolerated the procedure well with no immediate complications        Assessment & Plan      Diagnosis Plan   1. Left knee pain, unspecified chronicity  Large Joint Arthrocentesis: L knee      2. Arthritis of knee, left  Large Joint Arthrocentesis: L knee          Regular exercise as tolerated  Ice, heat, and/or modalities as beneficial  Watch for signs and symptoms of infection  Call or notify for any adverse effect from injection therapy    Recommendations:    Follow up in 6 months     Patient agreeable to call or return sooner for any concerns.       "

## 2025-01-17 RX ORDER — CARVEDILOL 25 MG/1
25 TABLET ORAL 2 TIMES DAILY WITH MEALS
Qty: 60 TABLET | Refills: 0 | Status: SHIPPED | OUTPATIENT
Start: 2025-01-17

## 2025-01-21 DIAGNOSIS — F33.0 MILD EPISODE OF RECURRENT MAJOR DEPRESSIVE DISORDER: ICD-10-CM

## 2025-01-21 RX ORDER — ESCITALOPRAM OXALATE 20 MG/1
20 TABLET ORAL EVERY MORNING
Qty: 90 TABLET | Refills: 0 | Status: SHIPPED | OUTPATIENT
Start: 2025-01-21

## 2025-02-03 ENCOUNTER — HOSPITAL ENCOUNTER (OUTPATIENT)
Dept: CT IMAGING | Facility: HOSPITAL | Age: 55
Discharge: HOME | End: 2025-02-03
Admitting: NURSE PRACTITIONER
Payer: COMMERCIAL

## 2025-02-03 DIAGNOSIS — R93.89 ABNORMAL CHEST CT: ICD-10-CM

## 2025-02-03 PROCEDURE — 71250 CT THORAX DX C-: CPT

## 2025-02-07 ENCOUNTER — TELEPHONE (OUTPATIENT)
Dept: FAMILY MEDICINE CLINIC | Facility: CLINIC | Age: 55
End: 2025-02-07
Payer: COMMERCIAL

## 2025-02-07 NOTE — TELEPHONE ENCOUNTER
Caller: [facility]    Relationship:     Best call back number: [phone]    What is the best time to reach you: ANYTIME     Who are you requesting to speak with (clinical staff, provider,  specific staff member): CLINICAL STAFF     [facility] IS WANTING TO KNOW IF OFFICE RECEIVED PAPERWORK FOR BLOOD PRESSURE MONITORING SYSTEM. PLEASE FAX BACK PAPERWORK ASAP.

## 2025-02-17 ENCOUNTER — TELEPHONE (OUTPATIENT)
Age: 55
End: 2025-02-17

## 2025-02-17 ENCOUNTER — OFFICE VISIT (OUTPATIENT)
Age: 55
End: 2025-02-17
Payer: COMMERCIAL

## 2025-02-17 ENCOUNTER — LAB (OUTPATIENT)
Facility: HOSPITAL | Age: 55
End: 2025-02-17
Payer: COMMERCIAL

## 2025-02-17 VITALS
HEART RATE: 66 BPM | WEIGHT: 281.7 LBS | HEIGHT: 72 IN | DIASTOLIC BLOOD PRESSURE: 98 MMHG | BODY MASS INDEX: 38.16 KG/M2 | SYSTOLIC BLOOD PRESSURE: 168 MMHG | TEMPERATURE: 97.3 F

## 2025-02-17 DIAGNOSIS — M17.0 PRIMARY OSTEOARTHRITIS OF BOTH KNEES: ICD-10-CM

## 2025-02-17 DIAGNOSIS — M51.360 DEGENERATION OF INTERVERTEBRAL DISC OF LUMBAR REGION WITH DISCOGENIC BACK PAIN: ICD-10-CM

## 2025-02-17 DIAGNOSIS — Z79.899 HIGH RISK MEDICATION USE: ICD-10-CM

## 2025-02-17 DIAGNOSIS — R53.83 OTHER FATIGUE: ICD-10-CM

## 2025-02-17 DIAGNOSIS — N18.9 CHRONIC KIDNEY DISEASE, UNSPECIFIED CKD STAGE: ICD-10-CM

## 2025-02-17 DIAGNOSIS — Z79.899 IMMUNOSUPPRESSION DUE TO DRUG THERAPY: ICD-10-CM

## 2025-02-17 DIAGNOSIS — M06.9 RHEUMATOID ARTHRITIS INVOLVING MULTIPLE SITES, UNSPECIFIED WHETHER RHEUMATOID FACTOR PRESENT: Primary | ICD-10-CM

## 2025-02-17 DIAGNOSIS — Z13.220 NEED FOR LIPID SCREENING: ICD-10-CM

## 2025-02-17 DIAGNOSIS — M06.9 RHEUMATOID ARTHRITIS INVOLVING MULTIPLE SITES, UNSPECIFIED WHETHER RHEUMATOID FACTOR PRESENT: ICD-10-CM

## 2025-02-17 DIAGNOSIS — M15.9 GENERALIZED OSTEOARTHROSIS, INVOLVING MULTIPLE SITES: ICD-10-CM

## 2025-02-17 DIAGNOSIS — E66.9 OBESITY, UNSPECIFIED CLASS, UNSPECIFIED OBESITY TYPE, UNSPECIFIED WHETHER SERIOUS COMORBIDITY PRESENT: ICD-10-CM

## 2025-02-17 DIAGNOSIS — D84.821 IMMUNOSUPPRESSION DUE TO DRUG THERAPY: ICD-10-CM

## 2025-02-17 PROCEDURE — 86037 ANCA TITER EACH ANTIBODY: CPT

## 2025-02-17 PROCEDURE — 86200 CCP ANTIBODY: CPT

## 2025-02-17 PROCEDURE — 85732 THROMBOPLASTIN TIME PARTIAL: CPT

## 2025-02-17 PROCEDURE — 1160F RVW MEDS BY RX/DR IN RCRD: CPT | Performed by: INTERNAL MEDICINE

## 2025-02-17 PROCEDURE — 85613 RUSSELL VIPER VENOM DILUTED: CPT

## 2025-02-17 PROCEDURE — 86147 CARDIOLIPIN ANTIBODY EA IG: CPT

## 2025-02-17 PROCEDURE — 86235 NUCLEAR ANTIGEN ANTIBODY: CPT

## 2025-02-17 PROCEDURE — 81003 URINALYSIS AUTO W/O SCOPE: CPT

## 2025-02-17 PROCEDURE — 83516 IMMUNOASSAY NONANTIBODY: CPT

## 2025-02-17 PROCEDURE — 36415 COLL VENOUS BLD VENIPUNCTURE: CPT

## 2025-02-17 PROCEDURE — 86038 ANTINUCLEAR ANTIBODIES: CPT

## 2025-02-17 PROCEDURE — 86160 COMPLEMENT ANTIGEN: CPT

## 2025-02-17 PROCEDURE — 99204 OFFICE O/P NEW MOD 45 MIN: CPT | Performed by: INTERNAL MEDICINE

## 2025-02-17 PROCEDURE — 86225 DNA ANTIBODY NATIVE: CPT

## 2025-02-17 PROCEDURE — 1159F MED LIST DOCD IN RCRD: CPT | Performed by: INTERNAL MEDICINE

## 2025-02-17 PROCEDURE — 86146 BETA-2 GLYCOPROTEIN ANTIBODY: CPT

## 2025-02-17 PROCEDURE — 80074 ACUTE HEPATITIS PANEL: CPT

## 2025-02-17 RX ORDER — METHOTREXATE 2.5 MG/1
15 TABLET ORAL WEEKLY
Qty: 24 TABLET | Refills: 2 | Status: SHIPPED | OUTPATIENT
Start: 2025-02-17

## 2025-02-17 RX ORDER — FOLIC ACID 1 MG/1
1000 TABLET ORAL DAILY
Qty: 90 TABLET | Refills: 3 | Status: SHIPPED | OUTPATIENT
Start: 2025-02-17

## 2025-02-17 NOTE — TELEPHONE ENCOUNTER
I called Dr. Edwards's office at [phone] and per [facility] voicemail, they want request faxed to them at [phone].  I faxed request. -SELMA Sanchez

## 2025-02-17 NOTE — PATIENT INSTRUCTIONS
Methotrexate Tablets    What is this medication?  METHOTREXATE (METH oh TREX ate) treats autoimmune conditions, such as arthritis and psoriasis. It works by decreasing inflammation, which can reduce pain and prevent long-term injury to the joints and skin. It may also be used to treat some types of cancer. It works by slowing down the growth of cancer cells.  This medicine may be used for other purposes; ask your health care provider or pharmacist if you have questions.  COMMON BRAND NAME(S): Rheumatrex, Trexall    What should I tell my care team before I take this medication?  They need to know if you have any of these conditions:  Dehydration  Diabetes  Fluid in the stomach area or lungs  Frequently drink alcohol  Having surgery, including dental surgery  High cholesterol  Immune system problems  Inflammatory bowel disease, such as ulcerative colitis  Kidney disease  Liver disease  Low blood cell levels (white cells, red cells, and platelets)  Lung disease  Recent or ongoing radiation  Recent or upcoming vaccine  Stomach ulcers, other stomach or intestine problems  An unusual or allergic reaction to methotrexate, other medications, foods, dyes, or preservatives  Pregnant or trying to get pregnant  Breastfeeding    How should I use this medication?  Take this medication by mouth with water. Take it as directed on the prescription label. Do not take extra. Keep taking this medication until your care team tells you to stop.  Know why you are taking this medication and how you should take it. To treat conditions such as arthritis and psoriasis, this medication is taken ONCE A WEEK as a single dose or divided into 3 smaller doses taken 12 hours apart (do not take more than 3 doses 12 hours apart each week). This medication is NEVER taken daily to treat conditions other than cancer. Taking this medication more often than directed can cause serious side effects, even death. Talk to your care team about why you are taking  "this medication, how often you will take it, and what your dose is. Ask your care team to put the reason you take this medication on the prescription.  If you take this medication ONCE A WEEK, choose a day of the week before you start. Ask your pharmacist to include the day of the week on the label. Avoid \"Monday\", which could be misread as \"Morning\".  Handling this medication may be harmful. Talk to your care team about how to handle this medication. Special instructions may apply.  Talk to your care team about the use of this medication in children. While it may be prescribed for selected conditions, precautions do apply.  Overdosage: If you think you have taken too much of this medicine contact a poison control center or emergency room at once.  NOTE: This medicine is only for you. Do not share this medicine with others.    What if I miss a dose?  If you miss a dose, talk with your care team. Do not take double or extra doses.    What may interact with this medication?  Do not take this medication with any of the following:  Acitretin  Live virus vaccines  Probenecid  This medication may also interact with the following:  Alcohol  Aspirin and aspirin-like medications  Certain antibiotics, such as penicillin, neomycin, sulfamethoxazole; trimethoprim  Certain medications for stomach problems, such as lansoprazole, omeprazole, pantoprazole  Clozapine  Cyclosporine  Dapsone  Folic acid  Foscarnet  NSAIDs, medications for pain and inflammation, such as ibuprofen or naproxen  Phenytoin  Pyrimethamine  Steroid medications, such as prednisone or cortisone  Tacrolimus  Theophylline  This list may not describe all possible interactions. Give your health care provider a list of all the medicines, herbs, non-prescription drugs, or dietary supplements you use. Also tell them if you smoke, drink alcohol, or use illegal drugs. Some items may interact with your medicine.    What should I watch for while using this " medication?  Visit your care team for regular checks on your progress. It may be some time before you see the benefit from this medication.  You may need blood work done while you are taking this medication.  If your care team has also prescribed folic acid, they may instruct you to skip your folic acid dose on the day you take methotrexate.  This medication can make you more sensitive to the sun. Keep out of the sun. If you cannot avoid being in the sun, wear protective clothing and sunscreen. Do not use sun lamps, tanning beds, or tanning booths.  Check with your care team if you have severe diarrhea, nausea, and vomiting, or if you sweat a lot. The loss of too much body fluid may make it dangerous for you to take this medication.  This medication may increase your risk of getting an infection. Call your care team for advice if you get a fever, chills, sore throat, or other symptoms of a cold or flu. Do not treat yourself. Try to avoid being around people who are sick.  Talk to your care team about your risk of cancer. You may be more at risk for certain types of cancers if you take this medication.  Talk to your care team if you or your partner may be pregnant. Serious birth defects can occur if you take this medication during pregnancy and for 6 months after the last dose. You will need a negative pregnancy test before starting this medication. Contraception is recommended while taking this medication and for 6 months after the last dose. Your care team can help you find the option that works for you.  If your partner can get pregnant, use a condom during sex while taking this medication and for 3 months after the last dose.  Do not breastfeed while taking this medication and for 1 week after the last dose.  This medication may cause infertility. Talk to your care team if you are concerned about your fertility.    What side effects may I notice from receiving this medication?  Side effects that you should report  to your care team as soon as possible:  Allergic reactions--skin rash, itching, hives, swelling of the face, lips, tongue, or throat  Dry cough, shortness of breath or trouble breathing  Infection--fever, chills, cough, sore throat, wounds that don't heal, pain or trouble when passing urine, general feeling of discomfort or being unwell  Kidney injury--decrease in the amount of urine, swelling of the ankles, hands, or feet  Liver injury--right upper belly pain, loss of appetite, nausea, light-colored stool, dark yellow or brown urine, yellowing skin or eyes, unusual weakness or fatigue  Low red blood cell level--unusual weakness or fatigue, dizziness, headache, trouble breathing  Pain, tingling, or numbness in the hands or feet, muscle weakness, change in vision, confusion or trouble speaking, loss of balance or coordination, trouble walking, seizures  Redness, blistering, peeling, or loosening of the skin, including inside the mouth  Stomach bleeding--bloody or black, tar-like stools, vomiting blood or brown material that looks like coffee grounds  Stomach pain that is severe, does not away, or gets worse  Unusual bruising or bleeding  Side effects that usually do not require medical attention (report these to your care team if they continue or are bothersome):  Diarrhea  Dizziness  Hair loss  Nausea  Pain, redness, or swelling with sores inside the mouth or throat  Skin reactions on sun-exposed areas  Vomiting  This list may not describe all possible side effects. Call your doctor for medical advice about side effects. You may report side effects to FDA at 3-647-FDA-8743.    Where should I keep my medication?  Keep out of the reach of children and pets.  Store at room temperature between 20 and 25 degrees C (68 and 77 degrees F). Protect from light. Keep the container tightly closed. Get rid of any unused medication after the expiration date.  To get rid of medications that are no longer needed or have  :  Take the medication to a medication take-back program. Check with your pharmacy or law enforcement to find a location.  If you cannot return the medication, ask your pharmacist or care team how to get rid of this medication safely.  NOTE: This sheet is a summary. It may not cover all possible information. If you have questions about this medicine, talk to your doctor, pharmacist, or health care provider.  ©  [employer] (2024 00:00:00)

## 2025-02-17 NOTE — PROGRESS NOTES
Office Visit       Date: 02/17/2025   Patient Name: Dharmesh Samuel  MRN: [id number]  YOB: 1970    Referring Physician: Michelle Sky APRN     Chief Complaint:   Chief Complaint   Patient presents with    Rheumatoid Arthritis       History of Present Illness: Dharmesh Samuel is a 54 y.o. male with history of chronic kidney disease, chronic low back pain with left sciatica, sleep apnea, COPD who is here today consultation from his primary care provider for rheumatoid arthritis    He gets left knee Euflexxa injections with [facility] for osteoarthritis    He has previously been referred to Dr. Edwards in 2022    Specialist: Nephrology, orthopedic surgery, pain management, [facility]  History of Present Illness    He has been under the care of Dr. Edwards, a rheumatologist in [place], for suspected inflammatory arthritis. He has been experiencing joint flare-ups for several years, particularly in his feet, ankles, left knee, left shoulder, back, and neck. His knees and hands are currently swollen. He has received several Euflexxa injections in his knee from [facility], which have provided some relief. He has undergone 3 MRIs for his back and is considering surgery. He is uncertain about the effectiveness of Enbrel but finds prednisone helpful. He recently completed a 4-day course of prednisone for joint flare, which reduced his swelling and flare-ups. He reports no rashes, mouth or nose sores, or finger discoloration. He experiences constant numbness in both hands, which he attributes to carpal tunnel syndrome. He recently resumed methotrexate after a 2-month break due to lack of perceived effect, during which he experienced more flare-ups. He reports no mouth sores from methotrexate. He believes he had a fever a few weeks ago. He reports no history of blood clots, tuberculosis, or hepatitis. He has not had his cholesterol checked recently. He is trying to get  approved for Wegov[employer] for weight loss through Medicaid. His treatment regimen includes methotrexate, prednisone for flare-ups, and hydroxychloroquine, which he discontinued as per his doctor's advice. He also was on Enbrel injections for about a year. However, he has not adhered to this medication schedule for approximately a year, with the exception of methotrexate, which he has been taking for the past 3 months. He is supposed to take 6 tablets of methotrexate weekly.    He is currently seeking a dental appointment for abscessed teeth. He reports pain in his upper and lower teeth    He follows with a lung specialist for COPD and undergoes CT scans of the chest with his pulmonologist.    He has chronic kidney disease followed by nephrology.  He takes gabapentin as needed for pain, up to 2 doses in the evening. He has discontinued tramadol due to ineffectiveness. He has not taken aspirin or ibuprofen since being advised against it.    He has extreme sleep apnea followed by pulmonary.    FAMILY HISTORY  His father has lupus and arthritis.    MEDICATIONS  Current: Methotrexate, prednisone, gabapentin  Discontinued: Hydroxychloroquine, Enbrel, tramadol      Labs 4/5/2022: Positive ALFREDO, positive SSA greater than 8, sed rate 38, normal CRP, negative RNP antibodies, negative Greene antibodies, negative chromatin antibodies, negative Lourdes 1 antibodies, negative anticentromere antibody, negative rheumatoid factor, negative SCL 70, negative double-stranded DNA    Subjective     Review of Systems: Review of Systems   Constitutional:  Positive for fatigue. Negative for chills, fever and unexpected weight loss.   HENT:  Positive for dental problem. Negative for mouth sores, sinus pressure and sore throat.    Eyes:  Positive for photophobia. Negative for pain and redness.   Respiratory:  Positive for apnea and shortness of breath. Negative for cough.    Cardiovascular:  Negative for chest pain.   Gastrointestinal:  Negative for  abdominal pain, blood in stool, diarrhea, nausea, vomiting and GERD.   Endocrine: Negative for polydipsia and polyuria.   Genitourinary:  Negative for dysuria, genital sores and hematuria.   Musculoskeletal:  Positive for arthralgias, back pain, gait problem, joint swelling, myalgias, neck pain and neck stiffness.   Skin:  Negative for rash and bruise.   Neurological:  Positive for numbness. Negative for seizures, weakness and memory problem.   Hematological:  Negative for adenopathy. Does not bruise/bleed easily.   Psychiatric/Behavioral:  Positive for sleep disturbance and depressed mood. The patient is nervous/anxious.         Past Medical History:   Past Medical History:   Diagnosis Date    Bulging lumbar disc     Depression 2017    Hypertension     Impaired mobility     Low back pain     Obesity 2020    Pinched nerve     Rheumatoid arthritis     Sleep apnea     Visual impairment 2020       Past Surgical History: History reviewed. No pertinent surgical history.    Family History:   Family History   Problem Relation Age of Onset    Hypertension Father     Lupus Father     Sleep apnea Paternal Uncle     Arthritis Maternal Grandmother     Alcohol abuse Maternal Grandfather     Arthritis Paternal Grandmother        Social History:   Social History     Socioeconomic History    Marital status:    Tobacco Use    Smoking status: Every Day     Current packs/day: 1.00     Average packs/day: 1 pack/day for 15.0 years (15.0 ttl pk-yrs)     Types: Cigarettes     Passive exposure: Current    Smokeless tobacco: Former     Types: Snuff   Vaping Use    Vaping status: Never Used   Substance and Sexual Activity    Alcohol use: Yes     Comment: 4 drinks a day    Drug use: Not Currently     Types: Marijuana    Sexual activity: Yes     Partners: Female     Birth control/protection: None       Medications:   Current Outpatient Medications:     albuterol sulfate HFA (Ventolin HFA) 108 (90 Base) MCG/ACT inhaler, Inhale 2 puffs  Every 4 (Four) Hours As Needed for Wheezing or Shortness of Air., Disp: 18 g, Rfl: 5    amLODIPine (NORVASC) 10 MG tablet, Take 1 tablet by mouth once daily, Disp: 90 tablet, Rfl: 0    azelastine (ASTELIN) 0.1 % nasal spray, Administer 1 spray into the nostril(s) as directed by provider 2 (Two) Times a Day As Needed for Rhinitis or Allergies. Use in each nostril as directed, Disp: 1 each, Rfl: 5    busPIRone (BUSPAR) 5 MG tablet, TAKE 1 TABLET BY MOUTH TWICE DAILY MAY  INCREASE  TO  2  TWICE  DAILY  AFTER  5  DAYS  IF  NEEDED., Disp: 90 tablet, Rfl: 0    carvedilol (COREG) 25 MG tablet, TAKE 1 TABLET BY MOUTH TWICE DAILY WITH MEALS, Disp: 60 tablet, Rfl: 0    cycloSPORINE (RESTASIS) 0.05 % ophthalmic emulsion, INSTILL 1 DROP INTO EACH EYE TWICE DAILY, Disp: 180 each, Rfl: 0    escitalopram (LEXAPRO) 20 MG tablet, TAKE 1 TABLET BY MOUTH ONCE DAILY IN THE MORNING, Disp: 90 tablet, Rfl: 0    Fluticasone-Umeclidin-Vilant (TRELEGY) 100-62.5-25 MCG/ACT inhaler, Inhale 1 puff Daily. Rinse mouth out after use, Disp: 1 each, Rfl: 5    folic acid (FOLVITE) 1 MG tablet, Take 1 tablet by mouth Daily., Disp: 90 tablet, Rfl: 3    gabapentin (NEURONTIN) 600 MG tablet, TAKE 1 TABLET BY MOUTH THREE TIMES DAILY, Disp: 90 tablet, Rfl: 1    hydrOXYzine (ATARAX) 10 MG tablet, Take 1 tablet by mouth 3 (Three) Times a Day As Needed for Anxiety., Disp: 30 tablet, Rfl: 2    lisinopril (PRINIVIL,ZESTRIL) 40 MG tablet, Take 1 tablet by mouth once daily, Disp: 90 tablet, Rfl: 0    methotrexate 2.5 MG tablet, Take 6 tablets by mouth 1 (One) Time Per Week., Disp: 24 tablet, Rfl: 2    pantoprazole (PROTONIX) 40 MG EC tablet, Take 1 tablet by mouth once daily, Disp: 30 tablet, Rfl: 0    torsemide (DEMADEX) 10 MG tablet, Take 1 tablet by mouth Daily., Disp: 30 tablet, Rfl: 2    traMADol (ULTRAM) 50 MG tablet, Take 1 tablet by mouth Every 12 (Twelve) Hours As Needed for Moderate Pain., Disp: 60 tablet, Rfl: 1    triamcinolone (KENALOG) 0.1 % lotion,  "Apply  topically to the appropriate area as directed 2 (Two) Times a Day., Disp: 60 mL, Rfl: 1    Semaglutide-Weight Management (Wegovy) 0.25 MG/0.5ML solution auto-injector, Inject 0.5 mL under the skin into the appropriate area as directed 1 (One) Time Per Week. (Patient not taking: Reported on 2/17/2025), Disp: 2 mL, Rfl: 0    Allergies: No Known Allergies      Objective      Vital Signs:   Vitals:    02/17/25 1311   Pulse: 66   Temp: 97.3 °F (36.3 °C)   Weight: 128 kg (281 lb 11.2 oz)   Height: 182.9 cm (72\")   PainLoc: Eye     Body mass index is 38.21 kg/m².       Physical Exam:  Physical Exam   MUSCULOSKELETAL:   No peripheral synovitis.  No dactylitis.  No pitting of the nails.  Tender lumbar spine  Crepitus with painful range of motion bilateral knees.  No warmth or effusion to the knees.    Complete joint exam was performed including the MCPs, PIPs, DIPs of the hands, wrists, elbows, shoulders, hips, knees and ankles.  No soft tissue swelling or tenderness is present except as above.    General: The patient is well-developed and well nourished. Cooperative, alert and oriented. Affect is normal. Hydration appears normal.   HEENT: Normocephalic and atraumatic. Lids and conjunctiva are normal. Pupils are equal and sclera are clear. Oropharynx is clear   NECK neck is supple without adenopathy, masses or thyromegaly.   CARDIOVASCULAR: Regular rate and rhythm. No murmurs, rubs or gallops   LUNGS: Effort is normal. Lungs are clear bilateral   ABDOMEN: Not examined  EXTREMITIES: Peripheral pulses are intact. No clubbing.   SKIN: No rashes. No subcutaneous nodules. No digital ulcers. No sclerodactyly.   NEUROLOGIC: Gait is normal. Strength testing is normal.  No focal neurologic deficits    Results Review:   Labs:    Lab Results   Component Value Date    GLUCOSE 105 (H) 12/23/2024    BUN 46 (H) 12/23/2024    CREATININE 1.59 (H) 12/23/2024    EGFR 51.6 (L) 12/23/2024    BCR 28.9 (H) 12/23/2024    K 4.7 12/23/2024    " "CO2 27.4 12/23/2024    CALCIUM 9.7 12/23/2024    ALBUMIN 4.0 12/23/2024    BILITOT 0.4 12/23/2024    AST 21 12/23/2024    ALT 22 12/23/2024     Lab Results   Component Value Date    WBC 6.61 12/23/2024    HGB 16.2 12/23/2024    HCT 47.2 12/23/2024    MCV 96.3 12/23/2024     12/23/2024     Lab Results   Component Value Date    SEDRATE 35 (H) 01/25/2023     No results found for: \"CRP\"  No results found for: \"QUANTIFERO\", \"QUANTITB1\", \"QUANTITB2\", \"QUANTIFERN\", \"QUANTIFERM\", \"QUANTITBGLDP\"  No results found for: \"RF\"  Lab Results   Component Value Date    HEPCVIRUSABY Non Reactive 12/23/2024           Procedures    Assessment / Plan      1. Rheumatoid arthritis involving multiple sites, unspecified whether rheumatoid factor present    2. High risk medication use    3. Immunosuppression due to drug therapy    4. Generalized osteoarthrosis, involving multiple sites    5. Primary osteoarthritis of both knees    6. Degeneration of intervertebral disc of lumbar region with discogenic back pain    7. Chronic kidney disease, unspecified CKD stage    8. Obesity, unspecified class, unspecified obesity type, unspecified whether serious comorbidity present    9. Need for lipid screening    10. Other fatigue          Assessment & Plan    -Inflammatory arthritis  -Former manager at [employer]  -Labs 4/5/2022: +ALFREDO, +SSA greater than 8, sed rate 38, normal CRP, negative RNP antibodies, negative Greene antibodies, negative chromatin antibodies, negative Lourdes 1 antibodies, negative anticentromere antibody, negative rheumatoid factor, negative SCL 70, negative double-stranded DNA  -Dx inflammatory arthritis Dr. Edwards in [place] who treated him w/ Plaquenil, methotrexate, Enbrel (through 2023)    -Current Rx: Methotrexate, gabapentin per PCP  -Avoids NSAIDs with CKD followed by nephrology    The clinical presentation suggests a diagnosis of inflammatory arthritis, potentially rheumatoid arthritis versus Sjogren's disease, " given the recurrent flare-ups in multiple peripheral joints with labs showing positive ALFREDO, positive SSA. The primary concern is the inflammation in the joints, which could be effectively managed with methotrexate.  He also has some osteoarthritis multiple joints.  He will continue with methotrexate, taking 6 pills once a week, preferably split into 2 doses taken within a 24-hour period. A handout on methotrexate will be provided via [employer]. He is advised to temporarily discontinue methotrexate if he develops any infection due to its immunosuppressive properties. A standing order for labs every 3 months will be issued to monitor CBC and CMP. He is instructed to inform us of any increase in flare-ups. He is advised to avoid ibuprofen, naproxen, and other NSAIDs due to his chronic kidney disease. Gabapentin can be used for pain management as it is safe for patients with chronic kidney disease. At this time, Enbrel will be held off until his dental issues/abscesses are resolved. Labs will be conducted today, including hemoglobin A1c and cholesterol levels. X-rays of the hands and feet will also be obtained today to assess for signs of erosion and inflammatory arthritis in the joints.  Obtain records from his former rheumatologist Dr. Timbo Edwards.  Handout provided on methotrexate    Clinically he has no malar rash, Raynaud's, oral nasal ulcers, pleurisy/pericarditis, neurologic abnormalities, clotting disorder to suggest lupus or other connective tissue disease      -High risk medication   Risk of methotrexate discussed and include but are not limited to severe liver damage so can be fatal, the possible need for liver biopsy, bone marrow suppression that can lead to dangerously low blood counts, GI side effects including mouth sores and diarrhea, fatigue, and the rare risk of severe pulmonary complications.  There should be no alcohol consumed with methotrexate.  Methotrexate can cause severe fetal abnormalities  with his mother father is taking the medication and thus must be avoided if pregnancy is a possibility.  All medication is to be taken 1 day a week only.  The need for Q 8-12-week labs and the need for folic acid supplement were discussed.      -Osteoarthritis bilateral knees  The patient has received several Euflexxa injections in his knee from [facility], which have provided some relief.  Work on weight loss and Mediterranean diet  Avoid all NSAIDs with chronic kidney disease    -Degenerative disc disease lumbar spine  Follows with pain management in [place]    -Chronic pain    - Chronic kidney disease.  Follows with nephrology  He is advised to avoid ibuprofen, naproxen, and other NSAIDs due to his chronic kidney disease.   Gabapentin can be used for pain management as it is safe for patients with chronic kidney disease    - Chronic obstructive pulmonary disease (COPD).  He follows with a lung specialist and undergoes CT scans of the chest with his pulmonologist.    -Dental abscess.  He is advised to get his dental issues resolved as soon as possible to reduce the risk of infection, especially while on immunosuppressive therapy.  Avoid Biologics for now    - Sleep apnea.   follows up with his pulmonologist.    -Obesity      Orders Placed This Encounter   Procedures    XR Hand 2 View Bilateral    XR Foot 3+ View Bilateral    Comprehensive Metabolic Panel    CBC Auto Differential    C-reactive Protein    Sedimentation Rate    Urinalysis With Culture If Indicated -    Rheumatoid Factor    Cyclic Citrul Peptide Antibody, IgG / IgA    ANCA Panel    ALFREDO by IFA, Reflex 9-biomarkers profile    QuantiFERON-TB Gold Plus    Hepatitis Panel, Acute    Uric Acid    C4+C3    Beta-2 Glycoprotein Antibodies    Anticardiolipin Antibody, IgG / M, Qn    Lupus Anticoagulant Reflex    Hemoglobin A1c    Lipid Panel    Comprehensive Metabolic Panel    CBC Auto Differential    C-reactive Protein    Sedimentation Rate     New  Medications Ordered This Visit   Medications    methotrexate 2.5 MG tablet     Sig: Take 6 tablets by mouth 1 (One) Time Per Week.     Dispense:  24 tablet     Refill:  2    folic acid (FOLVITE) 1 MG tablet     Sig: Take 1 tablet by mouth Daily.     Dispense:  90 tablet     Refill:  3          Follow Up:   Return in about 4 months (around 6/17/2025).    Portions of this note have been copied forward.  I have reviewed and updated the patient's chief complaint, history of present illness, review of systems, past medical history, surgical history, family history, social history, medications and allergy list, physical exam, assessment and plan as appropriate.     Patient or patient representative verbalized consent for the use of Ambient Listening during the visit with  Dain Birch MD for chart documentation. 2/17/2025  14:39 EST        Dain Birch MD  [facility] [place] [facility]

## 2025-02-18 LAB
BILIRUB UR QL STRIP: NEGATIVE
C3 SERPL-MCNC: 120 MG/DL (ref 82–167)
C4 SERPL-MCNC: 17 MG/DL (ref 14–44)
CLARITY UR: CLEAR
COLOR UR: YELLOW
GLUCOSE UR STRIP-MCNC: NEGATIVE MG/DL
HAV IGM SERPL QL IA: NORMAL
HBV CORE IGM SERPL QL IA: NORMAL
HBV SURFACE AG SERPL QL IA: NORMAL
HCV AB SER QL: NORMAL
HGB UR QL STRIP.AUTO: NEGATIVE
HOLD SPECIMEN: NORMAL
KETONES UR QL STRIP: NEGATIVE
LEUKOCYTE ESTERASE UR QL STRIP.AUTO: NEGATIVE
NITRITE UR QL STRIP: NEGATIVE
PH UR STRIP.AUTO: 6.5 [PH] (ref 5–8)
PROT UR QL STRIP: NEGATIVE
SP GR UR STRIP: 1.01 (ref 1–1.03)
UROBILINOGEN UR QL STRIP: NORMAL

## 2025-02-19 LAB
B2 GLYCOPROT1 IGA SER-ACNC: <9 GPI IGA UNITS (ref 0–25)
B2 GLYCOPROT1 IGG SER-ACNC: <9 GPI IGG UNITS (ref 0–20)
B2 GLYCOPROT1 IGM SER-ACNC: <9 GPI IGM UNITS (ref 0–32)
CARDIOLIPIN IGG SER IA-ACNC: <9 GPL U/ML (ref 0–14)
CARDIOLIPIN IGM SER IA-ACNC: <9 MPL U/ML (ref 0–12)
CCP IGA+IGG SERPL IA-ACNC: 6 UNITS (ref 0–19)

## 2025-02-20 DIAGNOSIS — F41.8 SITUATIONAL ANXIETY: ICD-10-CM

## 2025-02-20 LAB
ANA SER QL IF: POSITIVE
ANA SPECKLED TITR SER: ABNORMAL {TITER}
CENTROMERE B AB SER-ACNC: <0.2 AI (ref 0–0.9)
CHROMATIN AB SERPL-ACNC: <0.2 AI (ref 0–0.9)
DSDNA AB SER-ACNC: 4 IU/ML (ref 0–9)
ENA JO1 AB SER-ACNC: <0.2 AI (ref 0–0.9)
ENA RNP AB SER-ACNC: 0.2 AI (ref 0–0.9)
ENA SCL70 AB SER-ACNC: <0.2 AI (ref 0–0.9)
ENA SM AB SER-ACNC: <0.2 AI (ref 0–0.9)
ENA SS-A AB SER-ACNC: >8 AI (ref 0–0.9)
ENA SS-B AB SER-ACNC: <0.2 AI (ref 0–0.9)
LA 2 SCREEN W REFLEX-IMP: ABNORMAL
LABORATORY COMMENT REPORT: ABNORMAL
Lab: ABNORMAL
Lab: ABNORMAL
MIXING DRVVT: 38.8 SEC (ref 0–40.4)
SCREEN APTT: 40.8 SEC (ref 0–43.5)
SCREEN DRVVT: 47.6 SEC (ref 0–47)

## 2025-02-20 RX ORDER — BUSPIRONE HYDROCHLORIDE 5 MG/1
TABLET ORAL
Qty: 90 TABLET | Refills: 0 | Status: SHIPPED | OUTPATIENT
Start: 2025-02-20

## 2025-02-21 ENCOUNTER — OFFICE VISIT (OUTPATIENT)
Dept: FAMILY MEDICINE CLINIC | Facility: CLINIC | Age: 55
End: 2025-02-21
Payer: COMMERCIAL

## 2025-02-21 VITALS
SYSTOLIC BLOOD PRESSURE: 134 MMHG | HEART RATE: 87 BPM | WEIGHT: 279.2 LBS | HEIGHT: 72 IN | BODY MASS INDEX: 37.82 KG/M2 | OXYGEN SATURATION: 99 % | RESPIRATION RATE: 18 BRPM | DIASTOLIC BLOOD PRESSURE: 88 MMHG

## 2025-02-21 DIAGNOSIS — M06.9 RHEUMATOID ARTHRITIS INVOLVING MULTIPLE SITES, UNSPECIFIED WHETHER RHEUMATOID FACTOR PRESENT: ICD-10-CM

## 2025-02-21 DIAGNOSIS — Z79.899 HIGH RISK MEDICATION USE: Primary | ICD-10-CM

## 2025-02-21 DIAGNOSIS — M54.42 CHRONIC BILATERAL LOW BACK PAIN WITH LEFT-SIDED SCIATICA: ICD-10-CM

## 2025-02-21 DIAGNOSIS — G89.29 CHRONIC BILATERAL LOW BACK PAIN WITH LEFT-SIDED SCIATICA: ICD-10-CM

## 2025-02-21 NOTE — PROGRESS NOTES
Subjective     Chief Complaint:    Chief Complaint   Patient presents with    Drug / Alcohol Assessment       History of Present Illness:   Request drug and etoh testing  He takes gabapentin for chronic back pain     Review of Systems  Gen- No fevers, chills  CV- No chest pain, palpitations  Resp- No cough, dyspnea  GI- No N/V/D, abd pain  Neuro-No dizziness, headaches      I have reviewed and/or updated the patient's past medical, surgical, family, social history and problem list as appropriate.     Medications:    Current Outpatient Medications:     albuterol sulfate HFA (Ventolin HFA) 108 (90 Base) MCG/ACT inhaler, Inhale 2 puffs Every 4 (Four) Hours As Needed for Wheezing or Shortness of Air., Disp: 18 g, Rfl: 5    amLODIPine (NORVASC) 10 MG tablet, Take 1 tablet by mouth once daily, Disp: 90 tablet, Rfl: 0    azelastine (ASTELIN) 0.1 % nasal spray, Administer 1 spray into the nostril(s) as directed by provider 2 (Two) Times a Day As Needed for Rhinitis or Allergies. Use in each nostril as directed, Disp: 1 each, Rfl: 5    busPIRone (BUSPAR) 5 MG tablet, TAKE 1 TABLET BY MOUTH TWICE DAILY MAY  INCREASE  TO  2  TABLETS  TWICE  DAILY  AFTER  5  DAYS  IF  NEEDED, Disp: 90 tablet, Rfl: 0    carvedilol (COREG) 25 MG tablet, TAKE 1 TABLET BY MOUTH TWICE DAILY WITH MEALS, Disp: 60 tablet, Rfl: 0    cycloSPORINE (RESTASIS) 0.05 % ophthalmic emulsion, INSTILL 1 DROP INTO EACH EYE TWICE DAILY, Disp: 180 each, Rfl: 0    escitalopram (LEXAPRO) 20 MG tablet, TAKE 1 TABLET BY MOUTH ONCE DAILY IN THE MORNING, Disp: 90 tablet, Rfl: 0    Fluticasone-Umeclidin-Vilant (TRELEGY) 100-62.5-25 MCG/ACT inhaler, Inhale 1 puff Daily. Rinse mouth out after use, Disp: 1 each, Rfl: 5    folic acid (FOLVITE) 1 MG tablet, Take 1 tablet by mouth Daily., Disp: 90 tablet, Rfl: 3    gabapentin (NEURONTIN) 600 MG tablet, TAKE 1 TABLET BY MOUTH THREE TIMES DAILY, Disp: 90 tablet, Rfl: 1    hydrOXYzine (ATARAX) 10 MG tablet, Take 1 tablet by  "mouth 3 (Three) Times a Day As Needed for Anxiety., Disp: 30 tablet, Rfl: 2    lisinopril (PRINIVIL,ZESTRIL) 40 MG tablet, Take 1 tablet by mouth once daily, Disp: 90 tablet, Rfl: 0    methotrexate 2.5 MG tablet, Take 6 tablets by mouth 1 (One) Time Per Week., Disp: 24 tablet, Rfl: 2    pantoprazole (PROTONIX) 40 MG EC tablet, Take 1 tablet by mouth once daily, Disp: 30 tablet, Rfl: 0    torsemide (DEMADEX) 10 MG tablet, Take 1 tablet by mouth Daily., Disp: 30 tablet, Rfl: 2    traMADol (ULTRAM) 50 MG tablet, Take 1 tablet by mouth Every 12 (Twelve) Hours As Needed for Moderate Pain., Disp: 60 tablet, Rfl: 1    triamcinolone (KENALOG) 0.1 % lotion, Apply  topically to the appropriate area as directed 2 (Two) Times a Day., Disp: 60 mL, Rfl: 1    Allergies:  No Known Allergies    Objective     Vital Signs:   Vitals:    02/21/25 1429   BP: 134/88   Pulse: 87   Resp: 18   SpO2: 99%   Weight: 127 kg (279 lb 3.2 oz)   Height: 182.9 cm (72\")     Body mass index is 37.87 kg/m².    Physical Exam:    Physical Exam  HENT:      Head: Normocephalic and atraumatic.      Nose: Nose normal.   Eyes:      Pupils: Pupils are equal, round, and reactive to light.   Cardiovascular:      Rate and Rhythm: Normal rate.   Pulmonary:      Effort: Pulmonary effort is normal.   Neurological:      General: No focal deficit present.      Mental Status: He is alert and oriented to person, place, and time.   Psychiatric:         Mood and Affect: Mood normal.         Behavior: Behavior normal.         Assessment / Plan     Assessment/Plan:   Problem List Items Addressed This Visit       Chronic bilateral low back pain with left-sided sciatica    Relevant Medications    gabapentin (NEURONTIN) 600 MG tablet    traMADol (ULTRAM) 50 MG tablet    Rheumatoid arthritis involving multiple sites    Relevant Medications    methotrexate 2.5 MG tablet    folic acid (FOLVITE) 1 MG tablet     Other Visit Diagnoses       High risk medication use    -  Primary    " Relevant Orders    Urine Drug Screen - Urine, Clean Catch    Ethanol level              Discussed plan of care in detail with pt today; pt verb understanding and agrees.    Follow up:  As needed    Electronically signed by AMY Escamilla   02/21/2025 14:42 EST      Please note that portions of this note were completed with a voice recognition program.

## 2025-02-22 LAB
C-ANCA TITR SER IF: NORMAL TITER
MYELOPEROXIDASE AB SER IA-ACNC: <0.2 UNITS (ref 0–0.9)
P-ANCA ATYPICAL TITR SER IF: NORMAL TITER
P-ANCA TITR SER IF: NORMAL TITER
PROTEINASE3 AB SER IA-ACNC: <0.2 UNITS (ref 0–0.9)

## 2025-02-23 NOTE — PROGRESS NOTES
"Olu Samuel is a 54 y.o. male here today for injection therapy.    Chief Complaint   Patient presents with    Left Knee - Injections     Euflexxa #2     Patient presents for left knee visco injection # 2    Past Medical History:   Diagnosis Date    Bulging lumbar disc     Depression 2017    Hypertension     Impaired mobility     Low back pain     Obesity 2020    Pinched nerve     Rheumatoid arthritis     Sleep apnea     Visual impairment 2020         No past surgical history on file.    No Known Allergies    Objective   /82   Ht 182.9 cm (72.01\")   Wt 81.1 kg (178 lb 12.8 oz)   BMI 24.24 kg/m²    Physical Exam    Skin exam stable with no erythema, ecchymosis or rash.  No new swelling.  No motor or sensory changes.  Distal pulse intact.    Large Joint Arthrocentesis: L knee  Date/Time: 1/9/2025 11:03 AM  Consent given by: patient  Site marked: site marked  Timeout: Immediately prior to procedure a time out was called to verify the correct patient, procedure, equipment, support staff and site/side marked as required   Supporting Documentation  Indications: pain   Procedure Details  Location: knee - L knee  Preparation: Patient was prepped and draped in the usual sterile fashion  Needle gauge: 21g.  Approach: anterolateral  Medications administered: 20 mg Sodium Hyaluronate (Viscosup) 20 MG/2ML  Patient tolerance: patient tolerated the procedure well with no immediate complications        Assessment & Plan      Diagnosis Plan   1. Left knee pain, unspecified chronicity        2. Arthritis of knee, left            Regular exercise as tolerated  Ice, heat, and/or modalities as beneficial  Watch for signs and symptoms of infection  Call or notify for any adverse effect from injection therapy    Recommendations:    Follow up in 1 week    Patient agreeable to call or return sooner for any concerns.       " The patient's goals for the shift include      The clinical goals for the shift include Patient will have stable UA and tolerate GT feed without difficulties through 1900 2/23    Patient VSS and afebrile this shift on RA. No RDS or desats. Tolerating continuous MSUD feed without dificulties- isoleucine and valine continue to be added Q4. Qshift UA collected - no ketones present. R-PICC c/d/I, tolerating custom fluids/lipids without difficulties. Mylicon drops administered at 1500 for gas/fussiness. Mom at the bedside.

## 2025-02-24 RX ORDER — CARVEDILOL 25 MG/1
25 TABLET ORAL 2 TIMES DAILY WITH MEALS
Qty: 60 TABLET | Refills: 0 | Status: SHIPPED | OUTPATIENT
Start: 2025-02-24

## 2025-02-26 LAB
AMPHETAMINES UR QL SCN: NEGATIVE NG/ML
BARBITURATES UR QL SCN: NEGATIVE NG/ML
BENZODIAZ UR QL SCN: NEGATIVE NG/ML
BZE UR QL SCN: NEGATIVE NG/ML
CANNABINOIDS UR QL SCN: NEGATIVE NG/ML
CREAT UR-MCNC: 28.8 MG/DL (ref 20–300)
ETHANOL BLD GC-MCNC: <.01 %
LABORATORY COMMENT REPORT: NORMAL
METHADONE UR QL SCN: NEGATIVE NG/ML
OPIATES UR QL SCN: NEGATIVE NG/ML
OXYCODONE+OXYMORPHONE UR QL SCN: NEGATIVE NG/ML
PCP UR QL: NEGATIVE NG/ML
PH UR: 8.7 [PH] (ref 4.5–8.9)
PROPOXYPH UR QL SCN: NEGATIVE NG/ML

## 2025-03-08 DIAGNOSIS — I10 ESSENTIAL HYPERTENSION: ICD-10-CM

## 2025-03-10 RX ORDER — LISINOPRIL 40 MG/1
40 TABLET ORAL DAILY
Qty: 90 TABLET | Refills: 0 | Status: SHIPPED | OUTPATIENT
Start: 2025-03-10

## 2025-03-12 DIAGNOSIS — F41.8 SITUATIONAL ANXIETY: ICD-10-CM

## 2025-03-12 RX ORDER — BUSPIRONE HYDROCHLORIDE 5 MG/1
TABLET ORAL
Qty: 90 TABLET | Refills: 0 | Status: SHIPPED | OUTPATIENT
Start: 2025-03-12

## 2025-03-18 ENCOUNTER — TELEPHONE (OUTPATIENT)
Dept: FAMILY MEDICINE CLINIC | Facility: CLINIC | Age: 55
End: 2025-03-18

## 2025-03-18 NOTE — TELEPHONE ENCOUNTER
I would recommend titers before getting a MMR vaccine. Also I cannot justify a drug screen more than every 3-6 months

## 2025-03-18 NOTE — TELEPHONE ENCOUNTER
Caller: Eddy Samuel    Relationship: Self    Best call back number: [phone]     What is the best time to reach you: ANYTIME     Who are you requesting to speak with (clinical staff, provider,  specific staff member): CLINICAL STAFF     Do you know the name of the person who called: THE PATIENT EDDY     What was the call regarding: THE PATIENT REPORTS HE IS ON  METHOTREXAT WANTS TO KNOW IF IT IS SAFE FOR HIM TO GET A MEASLES BOOSTER.      THE PATIENT ALSO WANTS TO KNOW THE FREQUENCY OF GETTING URINE DRUG SCREEN, ETHANOL LEVELS, AND OTHER LABS DONE TO  MAKE SURE HIS MEDICAID COVERS THE TESTS AND THERE ARE NO OUT OF POCKET COSTS FOR HIM ON HIS LABS.     Is it okay if the provider responds through [employer]: YES

## 2025-03-24 ENCOUNTER — OFFICE VISIT (OUTPATIENT)
Dept: FAMILY MEDICINE CLINIC | Facility: CLINIC | Age: 55
End: 2025-03-24
Payer: COMMERCIAL

## 2025-03-24 VITALS
HEART RATE: 60 BPM | OXYGEN SATURATION: 94 % | RESPIRATION RATE: 20 BRPM | WEIGHT: 285.6 LBS | SYSTOLIC BLOOD PRESSURE: 148 MMHG | BODY MASS INDEX: 38.68 KG/M2 | DIASTOLIC BLOOD PRESSURE: 90 MMHG | HEIGHT: 72 IN

## 2025-03-24 DIAGNOSIS — G89.29 CHRONIC BILATERAL LOW BACK PAIN WITH LEFT-SIDED SCIATICA: ICD-10-CM

## 2025-03-24 DIAGNOSIS — J44.9 CHRONIC OBSTRUCTIVE PULMONARY DISEASE, UNSPECIFIED COPD TYPE: ICD-10-CM

## 2025-03-24 DIAGNOSIS — M54.42 CHRONIC BILATERAL LOW BACK PAIN WITH LEFT-SIDED SCIATICA: ICD-10-CM

## 2025-03-24 DIAGNOSIS — I10 ESSENTIAL HYPERTENSION: Primary | ICD-10-CM

## 2025-03-24 DIAGNOSIS — M06.9 RHEUMATOID ARTHRITIS INVOLVING MULTIPLE SITES, UNSPECIFIED WHETHER RHEUMATOID FACTOR PRESENT: ICD-10-CM

## 2025-03-24 DIAGNOSIS — R73.03 PRE-DIABETES: ICD-10-CM

## 2025-03-24 DIAGNOSIS — N18.31 STAGE 3A CHRONIC KIDNEY DISEASE: ICD-10-CM

## 2025-03-24 DIAGNOSIS — R21 RASH: ICD-10-CM

## 2025-03-24 DIAGNOSIS — G89.29 CHRONIC PAIN OF LEFT KNEE: ICD-10-CM

## 2025-03-24 DIAGNOSIS — E66.01 SEVERE OBESITY (BMI 35.0-39.9) WITH COMORBIDITY: ICD-10-CM

## 2025-03-24 DIAGNOSIS — M25.562 CHRONIC PAIN OF LEFT KNEE: ICD-10-CM

## 2025-03-24 DIAGNOSIS — F33.0 MILD EPISODE OF RECURRENT MAJOR DEPRESSIVE DISORDER: ICD-10-CM

## 2025-03-24 PROCEDURE — 99214 OFFICE O/P EST MOD 30 MIN: CPT | Performed by: NURSE PRACTITIONER

## 2025-03-24 PROCEDURE — 3077F SYST BP >= 140 MM HG: CPT | Performed by: NURSE PRACTITIONER

## 2025-03-24 PROCEDURE — 1160F RVW MEDS BY RX/DR IN RCRD: CPT | Performed by: NURSE PRACTITIONER

## 2025-03-24 PROCEDURE — 3080F DIAST BP >= 90 MM HG: CPT | Performed by: NURSE PRACTITIONER

## 2025-03-24 PROCEDURE — 1125F AMNT PAIN NOTED PAIN PRSNT: CPT | Performed by: NURSE PRACTITIONER

## 2025-03-24 PROCEDURE — 1159F MED LIST DOCD IN RCRD: CPT | Performed by: NURSE PRACTITIONER

## 2025-03-24 RX ORDER — PHENTERMINE HYDROCHLORIDE 37.5 MG/1
TABLET ORAL
Qty: 15 TABLET | Refills: 0 | Status: SHIPPED | OUTPATIENT
Start: 2025-03-24

## 2025-03-24 RX ORDER — FLUOCINONIDE 0.5 MG/G
1 OINTMENT TOPICAL 2 TIMES DAILY
Qty: 30 G | Refills: 1 | Status: SHIPPED | OUTPATIENT
Start: 2025-03-24

## 2025-03-28 DIAGNOSIS — I10 ESSENTIAL HYPERTENSION: ICD-10-CM

## 2025-03-28 RX ORDER — AMLODIPINE BESYLATE 10 MG/1
10 TABLET ORAL DAILY
Qty: 90 TABLET | Refills: 0 | Status: SHIPPED | OUTPATIENT
Start: 2025-03-28

## 2025-03-31 DIAGNOSIS — F41.8 SITUATIONAL ANXIETY: ICD-10-CM

## 2025-03-31 RX ORDER — BUSPIRONE HYDROCHLORIDE 5 MG/1
TABLET ORAL
Qty: 90 TABLET | Refills: 0 | Status: SHIPPED | OUTPATIENT
Start: 2025-03-31

## 2025-04-01 LAB
ALPHA-GAL IGE QN: 6.07 KU/L
BEEF IGE QN: 3.52 KU/L
CONV CLASS DESCRIPTION: ABNORMAL
IGE SERPL-ACNC: 143 IU/ML (ref 6–495)
LAMB IGE QN: 0.4 KU/L
PORK IGE QN: 1.43 KU/L

## 2025-04-11 ENCOUNTER — OFFICE VISIT (OUTPATIENT)
Dept: PULMONOLOGY | Facility: CLINIC | Age: 55
End: 2025-04-11
Payer: COMMERCIAL

## 2025-04-11 VITALS
RESPIRATION RATE: 18 BRPM | WEIGHT: 279.6 LBS | HEIGHT: 72 IN | OXYGEN SATURATION: 96 % | DIASTOLIC BLOOD PRESSURE: 84 MMHG | HEART RATE: 73 BPM | BODY MASS INDEX: 37.87 KG/M2 | SYSTOLIC BLOOD PRESSURE: 156 MMHG

## 2025-04-11 DIAGNOSIS — J44.9 CHRONIC OBSTRUCTIVE PULMONARY DISEASE, UNSPECIFIED COPD TYPE: ICD-10-CM

## 2025-04-11 DIAGNOSIS — J30.89 OTHER ALLERGIC RHINITIS: ICD-10-CM

## 2025-04-11 DIAGNOSIS — E66.9 OBESITY (BMI 30-39.9): ICD-10-CM

## 2025-04-11 DIAGNOSIS — R93.89 ABNORMAL CHEST CT: Primary | ICD-10-CM

## 2025-04-11 DIAGNOSIS — F17.210 NICOTINE DEPENDENCE, CIGARETTES, UNCOMPLICATED: ICD-10-CM

## 2025-04-11 DIAGNOSIS — G47.33 OBSTRUCTIVE SLEEP APNEA: ICD-10-CM

## 2025-04-11 PROCEDURE — 3077F SYST BP >= 140 MM HG: CPT | Performed by: NURSE PRACTITIONER

## 2025-04-11 PROCEDURE — 99214 OFFICE O/P EST MOD 30 MIN: CPT | Performed by: NURSE PRACTITIONER

## 2025-04-11 PROCEDURE — 3079F DIAST BP 80-89 MM HG: CPT | Performed by: NURSE PRACTITIONER

## 2025-04-11 RX ORDER — ALBUTEROL SULFATE 90 UG/1
2 INHALANT RESPIRATORY (INHALATION) EVERY 4 HOURS PRN
Qty: 18 G | Refills: 5 | Status: SHIPPED | OUTPATIENT
Start: 2025-04-11

## 2025-04-11 NOTE — PROGRESS NOTES
"Chief Complaint   Patient presents with    Breathing Problem    Follow-up         Subjective   Dharmesh Samuel is a 54 y.o. male.   Patient comes today for follow up of shortness of breath and COPD.     Symptoms have been stable since the last clinic visit. Patient reports no recent exacerbations.     Patient is using medications, as prescribed. He has been out of trelegy and BLAS. He just got refill on Trelegy a few weeks ago. He feels better using trelegy.     He uses BLAS once every other day more if he is mowing or working in yard.     Exercise tolerance has also remained stable.     Continues to smoke 1 pack per day for over 20 years.    He is not using CPAP. He has had to reschedule sleep study and has not been able to get study done.     He uses nasal spray as needed and it helps.       The following portions of the patient's history were reviewed and updated as appropriate: allergies, current medications, past family history, past medical history, past social history, and past surgical history.    Review of Systems   HENT:  Negative for sinus pressure, sneezing and sore throat.    Respiratory:  Positive for cough. Negative for chest tightness, shortness of breath and wheezing.    Psychiatric/Behavioral:  Positive for sleep disturbance.        Objective   Visit Vitals  /84   Pulse 73   Resp 18   Ht 182.9 cm (72.01\")   Wt 127 kg (279 lb 9.6 oz)   SpO2 96%   BMI 37.91 kg/m²         Physical Exam  Vitals reviewed.   HENT:      Head: Atraumatic.      Mouth/Throat:      Mouth: Mucous membranes are moist.   Eyes:      Extraocular Movements: Extraocular movements intact.   Cardiovascular:      Rate and Rhythm: Normal rate and regular rhythm.   Pulmonary:      Effort: Pulmonary effort is normal. No respiratory distress.      Comments: Somewhat decreased A/E without wheezing.   Abdominal:      Comments: Obese abdomen.   Skin:     General: Skin is warm.   Neurological:      Mental Status: He is alert and oriented to " person, place, and time.           Assessment & Plan   Diagnoses and all orders for this visit:    1. Abnormal chest CT (Primary)  -     CT Chest Without Contrast Diagnostic; Future    2. Chronic obstructive pulmonary disease, unspecified COPD type  -     Fluticasone-Umeclidin-Vilant (TRELEGY) 100-62.5-25 MCG/ACT inhaler; Inhale 1 puff Daily. Rinse mouth out after use  Dispense: 30 each; Refill: 5    3. Nicotine dependence, cigarettes, uncomplicated    4. Obesity (BMI 30-39.9)    5. Obstructive sleep apnea    6. Other allergic rhinitis    Other orders  -     albuterol sulfate HFA (Ventolin HFA) 108 (90 Base) MCG/ACT inhaler; Inhale 2 puffs Every 4 (Four) Hours As Needed for Wheezing or Shortness of Air.  Dispense: 18 g; Refill: 5           Return in about 9 years (around 4/11/2034) for Recheck, For Patsy Wolff, Imaging studies, Titration study.    DISCUSSION (if any):  PFT 2024 consistent with moderate obstruction.     No change to the current medications has been made. Continue Trelegy and BLAS as needed.     Continue nasal spray as needed.     Compliance with medications stressed.     Side effects of prescribed medications discussed with the patient    Patient was strongly encouraged to quit smoking as soon as possible.    I have reviewed CT and he will need another CT in May and this has been ordered.       Study Result    Narrative & Impression   PROCEDURE: CT CHEST WO CONTRAST DIAGNOSTIC-     HISTORY: [place] RLL opacity from LDCT; R93.89-Abnormal findings on  diagnostic imaging of other specified body structures     COMPARISON: None.     PROCEDURE: Axial images were obtained from the lung apex to the mid  abdomen by computed tomography. This study was performed with techniques  to keep radiation doses as low as reasonably achievable, (ALARA).  Individualized dose reduction techniques using automated exposure  control or adjustment of mA and/or kV according to the patient size were  employed.      FINDINGS:     CHEST: There is no suspicious axillary adenopathy. There is no  suspicious hilar or mediastinal adenopathy. The heart is proper size.  There is no pericardial or pleural effusion.No new suspicious infiltrate  or nodule identified. 5 mm or less bilateral noncalcified pulmonary  nodules are not significantly changed from prior. Again noted is the  right lower lobe lateral soft tissue density which still measures 2.2 cm  in the anterior posterior diameter but has less extension centrally  towards the hilum. Suspect scar. Recommend 3-month follow-up exam in May  2025 to document continued stability. Limited images of the upper  abdomen demonstrate fullness of the adrenal glands consistent with  adrenal hyperplasia, stable     IMPRESSION:  Changed configuration of the right lower lobe  nodule/airspace disease laterally as described, recommend 3-month  follow-up chest CT in May 2025.     Additional bilateral 5 mm or less noncalcified pulmonary nodules appear  stable.           CTDI: 11.95 mGy  DLP:517.09 mGy.cm     This report was signed and finalized on 2/3/2025 2:41 PM by Jaqui Arzola,         He will need to restudy sleep study soon. He has the phone number to reschedule sleep study which is a titration study.       Dictated utilizing Dragon dictation.    This document was electronically signed by AMY Arzate April 15, 2025  15:57 EDT

## 2025-04-21 DIAGNOSIS — F41.8 SITUATIONAL ANXIETY: ICD-10-CM

## 2025-04-22 RX ORDER — BUSPIRONE HYDROCHLORIDE 5 MG/1
TABLET ORAL
Qty: 90 TABLET | Refills: 0 | Status: SHIPPED | OUTPATIENT
Start: 2025-04-22

## 2025-04-23 DIAGNOSIS — G89.29 CHRONIC BILATERAL LOW BACK PAIN WITH LEFT-SIDED SCIATICA: ICD-10-CM

## 2025-04-23 DIAGNOSIS — M54.42 CHRONIC BILATERAL LOW BACK PAIN WITH LEFT-SIDED SCIATICA: ICD-10-CM

## 2025-04-23 NOTE — TELEPHONE ENCOUNTER
Rx Refill Note  Requested Prescriptions     Pending Prescriptions Disp Refills    gabapentin (NEURONTIN) 600 MG tablet [Pharmacy Med Name: GABAPENTIN 600MG    TAB] 90 tablet 0     Sig: TAKE 1 TABLET BY MOUTH THREE TIMES DAILY      Last office visit with prescribing clinician: 3/24/2025   Last telemedicine visit with prescribing clinician: Visit date not found   Next office visit with prescribing clinician: 4/28/2025                         Would you like a call back once the refill request has been completed: [] Yes [] No    If the office needs to give you a call back, can they leave a voicemail: [] Yes [] No    Lauren Quiroz MA  04/23/25, 13:28 EDT

## 2025-04-25 DIAGNOSIS — G89.29 CHRONIC BILATERAL LOW BACK PAIN WITH LEFT-SIDED SCIATICA: ICD-10-CM

## 2025-04-25 DIAGNOSIS — M54.42 CHRONIC BILATERAL LOW BACK PAIN WITH LEFT-SIDED SCIATICA: ICD-10-CM

## 2025-04-25 RX ORDER — TRAMADOL HYDROCHLORIDE 50 MG/1
50 TABLET ORAL EVERY 12 HOURS PRN
Qty: 14 TABLET | Refills: 0 | Status: SHIPPED | OUTPATIENT
Start: 2025-04-25

## 2025-04-25 RX ORDER — GABAPENTIN 600 MG/1
600 TABLET ORAL 3 TIMES DAILY
Qty: 90 TABLET | Refills: 0 | Status: SHIPPED | OUTPATIENT
Start: 2025-04-25

## 2025-04-25 NOTE — TELEPHONE ENCOUNTER
Rx Refill Note  Requested Prescriptions     Pending Prescriptions Disp Refills    traMADol (ULTRAM) 50 MG tablet [Pharmacy Med Name: traMADol HCl 50 MG Oral Tablet] 14 tablet 0     Sig: TAKE 1 TABLET BY MOUTH EVERY 12 HOURS AS NEEDED FOR MODERATE PAIN      Last office visit with prescribing clinician: 3/24/2025   Last telemedicine visit with prescribing clinician: Visit date not found   Next office visit with prescribing clinician: 4/28/2025                         Would you like a call back once the refill request has been completed: [] Yes [] No    If the office needs to give you a call back, can they leave a voicemail: [] Yes [] No    Jenny Castillo MA  04/25/25, 17:21 EDT

## 2025-04-27 DIAGNOSIS — E66.01 SEVERE OBESITY (BMI 35.0-39.9) WITH COMORBIDITY: ICD-10-CM

## 2025-04-28 RX ORDER — PHENTERMINE HYDROCHLORIDE 37.5 MG/1
TABLET ORAL
Qty: 15 TABLET | Refills: 0 | Status: SHIPPED | OUTPATIENT
Start: 2025-04-28

## 2025-05-08 ENCOUNTER — TELEPHONE (OUTPATIENT)
Dept: FAMILY MEDICINE CLINIC | Facility: CLINIC | Age: 55
End: 2025-05-08
Payer: COMMERCIAL

## 2025-05-12 DIAGNOSIS — F33.0 MILD EPISODE OF RECURRENT MAJOR DEPRESSIVE DISORDER: ICD-10-CM

## 2025-05-13 DIAGNOSIS — R53.83 OTHER FATIGUE: ICD-10-CM

## 2025-05-13 DIAGNOSIS — M06.9 RHEUMATOID ARTHRITIS INVOLVING MULTIPLE SITES, UNSPECIFIED WHETHER RHEUMATOID FACTOR PRESENT: ICD-10-CM

## 2025-05-13 RX ORDER — METHOTREXATE 2.5 MG/1
15 TABLET ORAL WEEKLY
Qty: 24 TABLET | Refills: 0 | Status: SHIPPED | OUTPATIENT
Start: 2025-05-13

## 2025-05-13 RX ORDER — ESCITALOPRAM OXALATE 20 MG/1
20 TABLET ORAL EVERY MORNING
Qty: 90 TABLET | Refills: 0 | Status: SHIPPED | OUTPATIENT
Start: 2025-05-13

## 2025-05-14 ENCOUNTER — DOCUMENTATION (OUTPATIENT)
Dept: FAMILY MEDICINE CLINIC | Facility: CLINIC | Age: 55
End: 2025-05-14
Payer: COMMERCIAL

## 2025-05-14 ENCOUNTER — TELEPHONE (OUTPATIENT)
Dept: FAMILY MEDICINE CLINIC | Facility: CLINIC | Age: 55
End: 2025-05-14
Payer: COMMERCIAL

## 2025-05-17 DIAGNOSIS — F41.8 SITUATIONAL ANXIETY: ICD-10-CM

## 2025-05-19 RX ORDER — BUSPIRONE HYDROCHLORIDE 5 MG/1
TABLET ORAL
Qty: 90 TABLET | Refills: 0 | Status: SHIPPED | OUTPATIENT
Start: 2025-05-19

## 2025-05-28 DIAGNOSIS — E66.01 SEVERE OBESITY (BMI 35.0-39.9) WITH COMORBIDITY: ICD-10-CM

## 2025-05-28 NOTE — TELEPHONE ENCOUNTER
Rx Refill Note  Requested Prescriptions     Pending Prescriptions Disp Refills    phentermine (ADIPEX-P) 37.5 MG tablet [Pharmacy Med Name: Phentermine HCl 37.5 MG Oral Tablet] 15 tablet 0     Sig: TAKE 1/2 (ONE-HALF) TABLET BY MOUTH ONCE DAILY AT  11AM      Last office visit with prescribing clinician: 3/24/2025   Last telemedicine visit with prescribing clinician: Visit date not found   Next office visit with prescribing clinician: Visit date not found                         Would you like a call back once the refill request has been completed: [] Yes [] No    If the office needs to give you a call back, can they leave a voicemail: [] Yes [] No    Zuleika Majano, EVE  05/28/25, 11:31 EDT

## 2025-05-29 RX ORDER — PHENTERMINE HYDROCHLORIDE 37.5 MG/1
TABLET ORAL
Qty: 15 TABLET | Refills: 0 | OUTPATIENT
Start: 2025-05-29

## 2025-06-06 DIAGNOSIS — I10 ESSENTIAL HYPERTENSION: ICD-10-CM

## 2025-06-06 RX ORDER — LISINOPRIL 40 MG/1
40 TABLET ORAL DAILY
Qty: 90 TABLET | Refills: 0 | Status: SHIPPED | OUTPATIENT
Start: 2025-06-06

## 2025-06-08 DIAGNOSIS — R53.83 OTHER FATIGUE: ICD-10-CM

## 2025-06-08 DIAGNOSIS — M06.9 RHEUMATOID ARTHRITIS INVOLVING MULTIPLE SITES, UNSPECIFIED WHETHER RHEUMATOID FACTOR PRESENT: ICD-10-CM

## 2025-06-09 RX ORDER — METHOTREXATE 2.5 MG/1
15 TABLET ORAL WEEKLY
Qty: 24 TABLET | Refills: 0 | OUTPATIENT
Start: 2025-06-09

## 2025-06-09 NOTE — TELEPHONE ENCOUNTER
Declining methotrexate refill request. Patient needs updated labs for refill. Last labs 2/21/25 and patient was provided standing order for every 12 weeks.    HUB OK TO RELAY

## 2025-07-02 RX ORDER — CARVEDILOL 25 MG/1
25 TABLET ORAL 2 TIMES DAILY WITH MEALS
Qty: 60 TABLET | Refills: 0 | Status: SHIPPED | OUTPATIENT
Start: 2025-07-02

## 2025-08-04 DIAGNOSIS — I10 ESSENTIAL HYPERTENSION: ICD-10-CM

## 2025-08-04 DIAGNOSIS — M54.42 CHRONIC BILATERAL LOW BACK PAIN WITH LEFT-SIDED SCIATICA: ICD-10-CM

## 2025-08-04 DIAGNOSIS — G89.29 CHRONIC BILATERAL LOW BACK PAIN WITH LEFT-SIDED SCIATICA: ICD-10-CM

## 2025-08-04 RX ORDER — AMLODIPINE BESYLATE 10 MG/1
10 TABLET ORAL DAILY
Qty: 90 TABLET | Refills: 0 | Status: SHIPPED | OUTPATIENT
Start: 2025-08-04

## 2025-08-05 RX ORDER — TRAMADOL HYDROCHLORIDE 50 MG/1
50 TABLET ORAL EVERY 12 HOURS PRN
Qty: 14 TABLET | Refills: 0 | Status: SHIPPED | OUTPATIENT
Start: 2025-08-05

## 2025-08-05 RX ORDER — GABAPENTIN 600 MG/1
600 TABLET ORAL 3 TIMES DAILY
Qty: 90 TABLET | Refills: 2 | Status: SHIPPED | OUTPATIENT
Start: 2025-08-05

## 2025-08-09 DIAGNOSIS — F33.0 MILD EPISODE OF RECURRENT MAJOR DEPRESSIVE DISORDER: ICD-10-CM

## 2025-08-11 RX ORDER — ESCITALOPRAM OXALATE 20 MG/1
20 TABLET ORAL EVERY MORNING
Qty: 90 TABLET | Refills: 0 | Status: SHIPPED | OUTPATIENT
Start: 2025-08-11

## 2025-08-18 ENCOUNTER — OFFICE VISIT (OUTPATIENT)
Dept: FAMILY MEDICINE CLINIC | Facility: CLINIC | Age: 55
End: 2025-08-18
Payer: COMMERCIAL

## 2025-08-18 VITALS
DIASTOLIC BLOOD PRESSURE: 100 MMHG | SYSTOLIC BLOOD PRESSURE: 162 MMHG | WEIGHT: 280.6 LBS | HEIGHT: 72 IN | HEART RATE: 63 BPM | BODY MASS INDEX: 38.01 KG/M2 | OXYGEN SATURATION: 96 % | RESPIRATION RATE: 20 BRPM

## 2025-08-18 DIAGNOSIS — M54.42 CHRONIC BILATERAL LOW BACK PAIN WITH LEFT-SIDED SCIATICA: ICD-10-CM

## 2025-08-18 DIAGNOSIS — F41.8 SITUATIONAL ANXIETY: ICD-10-CM

## 2025-08-18 DIAGNOSIS — I10 ESSENTIAL HYPERTENSION: Primary | ICD-10-CM

## 2025-08-18 DIAGNOSIS — G89.29 CHRONIC PAIN OF LEFT KNEE: ICD-10-CM

## 2025-08-18 DIAGNOSIS — N18.31 STAGE 3A CHRONIC KIDNEY DISEASE: ICD-10-CM

## 2025-08-18 DIAGNOSIS — M06.9 RHEUMATOID ARTHRITIS INVOLVING MULTIPLE SITES, UNSPECIFIED WHETHER RHEUMATOID FACTOR PRESENT: ICD-10-CM

## 2025-08-18 DIAGNOSIS — R73.03 PRE-DIABETES: ICD-10-CM

## 2025-08-18 DIAGNOSIS — J44.9 CHRONIC OBSTRUCTIVE PULMONARY DISEASE, UNSPECIFIED COPD TYPE: ICD-10-CM

## 2025-08-18 DIAGNOSIS — M25.562 CHRONIC PAIN OF LEFT KNEE: ICD-10-CM

## 2025-08-18 DIAGNOSIS — F33.0 MILD EPISODE OF RECURRENT MAJOR DEPRESSIVE DISORDER: ICD-10-CM

## 2025-08-18 DIAGNOSIS — G89.29 CHRONIC BILATERAL LOW BACK PAIN WITH LEFT-SIDED SCIATICA: ICD-10-CM

## 2025-08-18 PROCEDURE — 99214 OFFICE O/P EST MOD 30 MIN: CPT | Performed by: NURSE PRACTITIONER

## 2025-08-18 PROCEDURE — 1160F RVW MEDS BY RX/DR IN RCRD: CPT | Performed by: NURSE PRACTITIONER

## 2025-08-18 PROCEDURE — 1125F AMNT PAIN NOTED PAIN PRSNT: CPT | Performed by: NURSE PRACTITIONER

## 2025-08-18 PROCEDURE — 3077F SYST BP >= 140 MM HG: CPT | Performed by: NURSE PRACTITIONER

## 2025-08-18 PROCEDURE — 3080F DIAST BP >= 90 MM HG: CPT | Performed by: NURSE PRACTITIONER

## 2025-08-18 PROCEDURE — 1159F MED LIST DOCD IN RCRD: CPT | Performed by: NURSE PRACTITIONER

## 2025-08-18 RX ORDER — BUSPIRONE HYDROCHLORIDE 5 MG/1
TABLET ORAL
Qty: 90 TABLET | Refills: 2 | Status: SHIPPED | OUTPATIENT
Start: 2025-08-18

## 2025-08-19 LAB
ALBUMIN SERPL-MCNC: 4.2 G/DL (ref 3.8–4.9)
ALP SERPL-CCNC: 101 IU/L (ref 44–121)
ALT SERPL-CCNC: 33 IU/L (ref 0–44)
AST SERPL-CCNC: 29 IU/L (ref 0–40)
BASOPHILS # BLD AUTO: 0.1 X10E3/UL (ref 0–0.2)
BASOPHILS NFR BLD AUTO: 1 %
BILIRUB SERPL-MCNC: 0.6 MG/DL (ref 0–1.2)
BUN SERPL-MCNC: 19 MG/DL (ref 6–24)
BUN/CREAT SERPL: 17 (ref 9–20)
CALCIUM SERPL-MCNC: 9.4 MG/DL (ref 8.7–10.2)
CHLORIDE SERPL-SCNC: 102 MMOL/L (ref 96–106)
CO2 SERPL-SCNC: 21 MMOL/L (ref 20–29)
CREAT SERPL-MCNC: 1.11 MG/DL (ref 0.76–1.27)
EGFRCR SERPLBLD CKD-EPI 2021: 79 ML/MIN/1.73
EOSINOPHIL # BLD AUTO: 0.4 X10E3/UL (ref 0–0.4)
EOSINOPHIL NFR BLD AUTO: 5 %
ERYTHROCYTE [DISTWIDTH] IN BLOOD BY AUTOMATED COUNT: 13.4 % (ref 11.6–15.4)
GLOBULIN SER CALC-MCNC: 3.8 G/DL (ref 1.5–4.5)
GLUCOSE SERPL-MCNC: 100 MG/DL (ref 70–99)
HCT VFR BLD AUTO: 48 % (ref 37.5–51)
HGB BLD-MCNC: 16.7 G/DL (ref 13–17.7)
IMM GRANULOCYTES # BLD AUTO: 0 X10E3/UL (ref 0–0.1)
IMM GRANULOCYTES NFR BLD AUTO: 0 %
LYMPHOCYTES # BLD AUTO: 2.4 X10E3/UL (ref 0.7–3.1)
LYMPHOCYTES NFR BLD AUTO: 28 %
MCH RBC QN AUTO: 36.4 PG (ref 26.6–33)
MCHC RBC AUTO-ENTMCNC: 34.8 G/DL (ref 31.5–35.7)
MCV RBC AUTO: 105 FL (ref 79–97)
MONOCYTES # BLD AUTO: 1 X10E3/UL (ref 0.1–0.9)
MONOCYTES NFR BLD AUTO: 12 %
NEUTROPHILS # BLD AUTO: 4.7 X10E3/UL (ref 1.4–7)
NEUTROPHILS NFR BLD AUTO: 54 %
PLATELET # BLD AUTO: 295 X10E3/UL (ref 150–450)
POTASSIUM SERPL-SCNC: 4.4 MMOL/L (ref 3.5–5.2)
PROT SERPL-MCNC: 8 G/DL (ref 6–8.5)
RBC # BLD AUTO: 4.59 X10E6/UL (ref 4.14–5.8)
SODIUM SERPL-SCNC: 136 MMOL/L (ref 134–144)
WBC # BLD AUTO: 8.6 X10E3/UL (ref 3.4–10.8)

## 2025-08-21 DIAGNOSIS — M06.9 RHEUMATOID ARTHRITIS INVOLVING MULTIPLE SITES, UNSPECIFIED WHETHER RHEUMATOID FACTOR PRESENT: ICD-10-CM

## 2025-08-21 DIAGNOSIS — R53.83 OTHER FATIGUE: ICD-10-CM

## 2025-08-21 RX ORDER — METHOTREXATE 2.5 MG/1
15 TABLET ORAL WEEKLY
Qty: 24 TABLET | Refills: 0 | OUTPATIENT
Start: 2025-08-21